# Patient Record
Sex: MALE | Race: WHITE | Employment: OTHER | URBAN - METROPOLITAN AREA
[De-identification: names, ages, dates, MRNs, and addresses within clinical notes are randomized per-mention and may not be internally consistent; named-entity substitution may affect disease eponyms.]

---

## 2019-04-23 ENCOUNTER — APPOINTMENT (OUTPATIENT)
Dept: GENERAL RADIOLOGY | Age: 74
End: 2019-04-23
Payer: MEDICARE

## 2019-04-23 ENCOUNTER — HOSPITAL ENCOUNTER (EMERGENCY)
Age: 74
Discharge: HOME | End: 2019-04-23
Attending: EMERGENCY MEDICINE
Payer: MEDICARE

## 2019-04-23 VITALS
WEIGHT: 235 LBS | BODY MASS INDEX: 31.83 KG/M2 | RESPIRATION RATE: 18 BRPM | SYSTOLIC BLOOD PRESSURE: 190 MMHG | HEIGHT: 72 IN | OXYGEN SATURATION: 94 % | HEART RATE: 83 BPM | DIASTOLIC BLOOD PRESSURE: 108 MMHG | TEMPERATURE: 97.7 F

## 2019-04-23 DIAGNOSIS — E86.0 DEHYDRATION: ICD-10-CM

## 2019-04-23 DIAGNOSIS — R53.83 FATIGUE, UNSPECIFIED TYPE: Primary | ICD-10-CM

## 2019-04-23 LAB
ALBUMIN SERPL-MCNC: 4.2 G/DL (ref 3.5–5.2)
ALP BLD-CCNC: 65 U/L (ref 40–129)
ALT SERPL-CCNC: <5 U/L (ref 0–40)
ANION GAP SERPL CALCULATED.3IONS-SCNC: 11 MMOL/L (ref 7–16)
AST SERPL-CCNC: 22 U/L (ref 0–39)
BASOPHILS ABSOLUTE: 0.04 E9/L (ref 0–0.2)
BASOPHILS RELATIVE PERCENT: 0.7 % (ref 0–2)
BILIRUB SERPL-MCNC: 0.4 MG/DL (ref 0–1.2)
BILIRUBIN URINE: NEGATIVE
BLOOD, URINE: NEGATIVE
BUN BLDV-MCNC: 19 MG/DL (ref 8–23)
CALCIUM SERPL-MCNC: 9.1 MG/DL (ref 8.6–10.2)
CHLORIDE BLD-SCNC: 105 MMOL/L (ref 98–107)
CLARITY: CLEAR
CO2: 25 MMOL/L (ref 22–29)
COLOR: YELLOW
CREAT SERPL-MCNC: 1.7 MG/DL (ref 0.7–1.2)
EOSINOPHILS ABSOLUTE: 0.11 E9/L (ref 0.05–0.5)
EOSINOPHILS RELATIVE PERCENT: 2 % (ref 0–6)
GFR AFRICAN AMERICAN: 48
GFR NON-AFRICAN AMERICAN: 39 ML/MIN/1.73
GLUCOSE BLD-MCNC: 70 MG/DL (ref 74–99)
GLUCOSE URINE: NEGATIVE MG/DL
HCT VFR BLD CALC: 47.4 % (ref 37–54)
HEMOGLOBIN: 15.9 G/DL (ref 12.5–16.5)
IMMATURE GRANULOCYTES #: 0.02 E9/L
IMMATURE GRANULOCYTES %: 0.4 % (ref 0–5)
INFLUENZA A BY PCR: NOT DETECTED
INFLUENZA B BY PCR: NOT DETECTED
KETONES, URINE: ABNORMAL MG/DL
LACTIC ACID: 1.4 MMOL/L (ref 0.5–2.2)
LEUKOCYTE ESTERASE, URINE: NEGATIVE
LYMPHOCYTES ABSOLUTE: 1.51 E9/L (ref 1.5–4)
LYMPHOCYTES RELATIVE PERCENT: 27.6 % (ref 20–42)
MAGNESIUM: 2.1 MG/DL (ref 1.6–2.6)
MCH RBC QN AUTO: 32 PG (ref 26–35)
MCHC RBC AUTO-ENTMCNC: 33.5 % (ref 32–34.5)
MCV RBC AUTO: 95.4 FL (ref 80–99.9)
METER GLUCOSE: 70 MG/DL (ref 74–99)
MONOCYTES ABSOLUTE: 0.6 E9/L (ref 0.1–0.95)
MONOCYTES RELATIVE PERCENT: 10.9 % (ref 2–12)
NEUTROPHILS ABSOLUTE: 3.2 E9/L (ref 1.8–7.3)
NEUTROPHILS RELATIVE PERCENT: 58.4 % (ref 43–80)
NITRITE, URINE: NEGATIVE
PDW BLD-RTO: 12.9 FL (ref 11.5–15)
PH UA: 6.5 (ref 5–9)
PLATELET # BLD: 141 E9/L (ref 130–450)
PMV BLD AUTO: 10.5 FL (ref 7–12)
POTASSIUM SERPL-SCNC: 4.8 MMOL/L (ref 3.5–5)
PRO-BNP: 168 PG/ML (ref 0–450)
PROTEIN UA: NEGATIVE MG/DL
RBC # BLD: 4.97 E12/L (ref 3.8–5.8)
SODIUM BLD-SCNC: 141 MMOL/L (ref 132–146)
SPECIFIC GRAVITY UA: 1.01 (ref 1–1.03)
T4 TOTAL: 7.3 MCG/DL (ref 4.5–11.7)
TOTAL PROTEIN: 6.7 G/DL (ref 6.4–8.3)
TROPONIN: <0.01 NG/ML (ref 0–0.03)
TSH SERPL DL<=0.05 MIU/L-ACNC: 1.54 UIU/ML (ref 0.27–4.2)
UROBILINOGEN, URINE: 0.2 E.U./DL
WBC # BLD: 5.5 E9/L (ref 4.5–11.5)

## 2019-04-23 PROCEDURE — 71045 X-RAY EXAM CHEST 1 VIEW: CPT

## 2019-04-23 PROCEDURE — 2580000003 HC RX 258: Performed by: EMERGENCY MEDICINE

## 2019-04-23 PROCEDURE — 99284 EMERGENCY DEPT VISIT MOD MDM: CPT

## 2019-04-23 PROCEDURE — 83735 ASSAY OF MAGNESIUM: CPT

## 2019-04-23 PROCEDURE — 85025 COMPLETE CBC W/AUTO DIFF WBC: CPT

## 2019-04-23 PROCEDURE — 96360 HYDRATION IV INFUSION INIT: CPT

## 2019-04-23 PROCEDURE — 81003 URINALYSIS AUTO W/O SCOPE: CPT

## 2019-04-23 PROCEDURE — 83880 ASSAY OF NATRIURETIC PEPTIDE: CPT

## 2019-04-23 PROCEDURE — 82962 GLUCOSE BLOOD TEST: CPT

## 2019-04-23 PROCEDURE — 84436 ASSAY OF TOTAL THYROXINE: CPT

## 2019-04-23 PROCEDURE — 93005 ELECTROCARDIOGRAM TRACING: CPT | Performed by: EMERGENCY MEDICINE

## 2019-04-23 PROCEDURE — 83605 ASSAY OF LACTIC ACID: CPT

## 2019-04-23 PROCEDURE — 84484 ASSAY OF TROPONIN QUANT: CPT

## 2019-04-23 PROCEDURE — 80053 COMPREHEN METABOLIC PANEL: CPT

## 2019-04-23 PROCEDURE — 87502 INFLUENZA DNA AMP PROBE: CPT

## 2019-04-23 PROCEDURE — 84443 ASSAY THYROID STIM HORMONE: CPT

## 2019-04-23 RX ORDER — 0.9 % SODIUM CHLORIDE 0.9 %
1000 INTRAVENOUS SOLUTION INTRAVENOUS ONCE
Status: COMPLETED | OUTPATIENT
Start: 2019-04-23 | End: 2019-04-23

## 2019-04-23 RX ORDER — GLIPIZIDE 5 MG/1
2.5 TABLET ORAL
COMMUNITY

## 2019-04-23 RX ADMIN — SODIUM CHLORIDE 1000 ML: 9 INJECTION, SOLUTION INTRAVENOUS at 18:03

## 2019-04-23 ASSESSMENT — ENCOUNTER SYMPTOMS
WHEEZING: 0
SHORTNESS OF BREATH: 0
ABDOMINAL PAIN: 0
EYE REDNESS: 0
VOMITING: 0
COUGH: 0
NAUSEA: 0
DIARRHEA: 0
SORE THROAT: 0
EYE PAIN: 0
BACK PAIN: 0
SINUS PRESSURE: 0
EYE DISCHARGE: 0

## 2019-04-23 NOTE — ED PROVIDER NOTES
Patient is a 66-year-old male presenting to ED with complaint of fatigue. Patient states he is feeling well yesterday during the afternoon today patient suddenly broke out in a sweat has felt very weak. Patient went into his PCP who advised them to come to the ED to be evaluated. Patient had a normal blood glucose, patient had normal examination at the office. Other than appearing severely fatigued. Patient was sent over to our facility. Patient denies any abdominal pain experience intermittent shortness of breath. Review of Systems   Constitutional: Positive for activity change, diaphoresis and fatigue. Negative for chills and fever. HENT: Negative for ear pain, sinus pressure and sore throat. Eyes: Negative for pain, discharge and redness. Respiratory: Negative for cough, shortness of breath and wheezing. Cardiovascular: Negative for chest pain. Gastrointestinal: Negative for abdominal pain, diarrhea, nausea and vomiting. Genitourinary: Negative for dysuria and frequency. Musculoskeletal: Negative for arthralgias and back pain. Skin: Negative for rash and wound. Neurological: Positive for weakness. Negative for headaches. Hematological: Negative for adenopathy. All other systems reviewed and are negative. Physical Exam   Constitutional: He is oriented to person, place, and time. He appears well-developed and well-nourished. No distress. HENT:   Head: Normocephalic and atraumatic. Eyes: Pupils are equal, round, and reactive to light. Neck: Normal range of motion. Neck supple. Cardiovascular: Normal rate, regular rhythm and normal heart sounds. Pulmonary/Chest: Effort normal and breath sounds normal. No respiratory distress. He has no wheezes. He has no rales. Abdominal: Soft. Bowel sounds are normal. He exhibits no distension. There is no tenderness. There is no rebound and no guarding. No palpable masses   Musculoskeletal: He exhibits no edema or tenderness. Ketones, Urine TRACE (A) Negative mg/dL    Specific Gravity, UA 1.010 1.005 - 1.030    Blood, Urine Negative Negative    pH, UA 6.5 5.0 - 9.0    Protein, UA Negative Negative mg/dL    Urobilinogen, Urine 0.2 <2.0 E.U./dL    Nitrite, Urine Negative Negative    Leukocyte Esterase, Urine Negative Negative   Brain Natriuretic Peptide   Result Value Ref Range    Pro- 0 - 450 pg/mL   TSH without Reflex   Result Value Ref Range    TSH 1.540 0.270 - 4.200 uIU/mL   T4   Result Value Ref Range    T4, Total 7.3 4.5 - 11.7 mcg/dL   POCT Glucose   Result Value Ref Range    Meter Glucose 70 (L) 74 - 99 mg/dL   EKG 12 Lead   Result Value Ref Range    Ventricular Rate 101 BPM    Atrial Rate 101 BPM    P-R Interval 162 ms    QRS Duration 90 ms    Q-T Interval 368 ms    QTc Calculation (Bazett) 477 ms    P Axis 43 degrees    R Axis -33 degrees    T Axis 3 degrees       Radiology:  XR CHEST PORTABLE   Final Result      No airspace opacities or pleural effusion.                ------------------------- NURSING NOTES AND VITALS REVIEWED ---------------------------  Date / Time Roomed:  4/23/2019  4:39 PM  ED Bed Assignment:  17/17    The nursing notes within the ED encounter and vital signs as below have been reviewed. BP (!) 190/108   Pulse 83   Temp 97.7 °F (36.5 °C)   Resp 18   Ht 6' (1.829 m)   Wt 235 lb (106.6 kg)   SpO2 94%   BMI 31.87 kg/m²   Oxygen Saturation Interpretation: Normal      ------------------------------------------ PROGRESS NOTES ------------------------------------------         11:27 AM  I have spoken with the patient and discussed todays results, in addition to providing specific details for the plan of care and counseling regarding the diagnosis and prognosis. Their questions are answered at this time and they are agreeable with the plan. I discussed at length with them reasons for immediate return here for re evaluation.  They will followup with their primary care physician by calling their office tomorrow. --------------------------------- ADDITIONAL PROVIDER NOTES ---------------------------------  At this time the patient is without objective evidence of an acute process requiring hospitalization or inpatient management. They have remained hemodynamically stable throughout their entire ED visit and are stable for discharge with outpatient follow-up. The plan has been discussed in detail and they are aware of the specific conditions for emergent return, as well as the importance of follow-up. Discharge Medication List as of 4/23/2019  7:24 PM          Diagnosis:  1. Fatigue, unspecified type    2. Dehydration        Disposition:  Patient's disposition: Discharge to home  Patient's condition is stable.          Tahira Soria DO  Resident  04/24/19 [id number]

## 2019-04-24 LAB
EKG ATRIAL RATE: 101 BPM
EKG P AXIS: 43 DEGREES
EKG P-R INTERVAL: 162 MS
EKG Q-T INTERVAL: 368 MS
EKG QRS DURATION: 90 MS
EKG QTC CALCULATION (BAZETT): 477 MS
EKG R AXIS: -33 DEGREES
EKG T AXIS: 3 DEGREES
EKG VENTRICULAR RATE: 101 BPM

## 2020-08-16 PROCEDURE — 99284 EMERGENCY DEPT VISIT MOD MDM: CPT

## 2020-08-17 ENCOUNTER — HOSPITAL ENCOUNTER (EMERGENCY)
Age: 75
Discharge: HOME | End: 2020-08-17
Attending: EMERGENCY MEDICINE
Payer: MEDICARE

## 2020-08-17 ENCOUNTER — APPOINTMENT (OUTPATIENT)
Dept: GENERAL RADIOLOGY | Age: 75
End: 2020-08-17
Payer: MEDICARE

## 2020-08-17 ENCOUNTER — APPOINTMENT (OUTPATIENT)
Dept: CT IMAGING | Age: 75
End: 2020-08-17
Payer: MEDICARE

## 2020-08-17 VITALS
SYSTOLIC BLOOD PRESSURE: 190 MMHG | HEIGHT: 72 IN | RESPIRATION RATE: 16 BRPM | BODY MASS INDEX: 32.51 KG/M2 | TEMPERATURE: 98.2 F | OXYGEN SATURATION: 94 % | HEART RATE: 88 BPM | WEIGHT: 240 LBS | DIASTOLIC BLOOD PRESSURE: 99 MMHG

## 2020-08-17 LAB
ALBUMIN SERPL-MCNC: 4 G/DL (ref 3.5–5.2)
ALP BLD-CCNC: 63 U/L (ref 40–129)
ALT SERPL-CCNC: <5 U/L (ref 0–40)
ANION GAP SERPL CALCULATED.3IONS-SCNC: 10 MMOL/L (ref 7–16)
AST SERPL-CCNC: 22 U/L (ref 0–39)
BASOPHILS ABSOLUTE: 0.04 E9/L (ref 0–0.2)
BASOPHILS RELATIVE PERCENT: 0.7 % (ref 0–2)
BILIRUB SERPL-MCNC: 0.4 MG/DL (ref 0–1.2)
BUN BLDV-MCNC: 22 MG/DL (ref 8–23)
CALCIUM SERPL-MCNC: 9.2 MG/DL (ref 8.6–10.2)
CHLORIDE BLD-SCNC: 101 MMOL/L (ref 98–107)
CO2: 25 MMOL/L (ref 22–29)
CREAT SERPL-MCNC: 1.4 MG/DL (ref 0.7–1.2)
EKG ATRIAL RATE: 90 BPM
EKG P-R INTERVAL: 180 MS
EKG Q-T INTERVAL: 386 MS
EKG QRS DURATION: 96 MS
EKG QTC CALCULATION (BAZETT): 472 MS
EKG R AXIS: 164 DEGREES
EKG T AXIS: 149 DEGREES
EKG VENTRICULAR RATE: 90 BPM
EOSINOPHILS ABSOLUTE: 0.11 E9/L (ref 0.05–0.5)
EOSINOPHILS RELATIVE PERCENT: 1.9 % (ref 0–6)
GFR AFRICAN AMERICAN: 59
GFR NON-AFRICAN AMERICAN: 49 ML/MIN/1.73
GLUCOSE BLD-MCNC: 86 MG/DL (ref 74–99)
HCT VFR BLD CALC: 47.9 % (ref 37–54)
HEMOGLOBIN: 15.8 G/DL (ref 12.5–16.5)
IMMATURE GRANULOCYTES #: 0.03 E9/L
IMMATURE GRANULOCYTES %: 0.5 % (ref 0–5)
LYMPHOCYTES ABSOLUTE: 1.72 E9/L (ref 1.5–4)
LYMPHOCYTES RELATIVE PERCENT: 29.3 % (ref 20–42)
MCH RBC QN AUTO: 31.4 PG (ref 26–35)
MCHC RBC AUTO-ENTMCNC: 33 % (ref 32–34.5)
MCV RBC AUTO: 95.2 FL (ref 80–99.9)
MONOCYTES ABSOLUTE: 0.67 E9/L (ref 0.1–0.95)
MONOCYTES RELATIVE PERCENT: 11.4 % (ref 2–12)
NEUTROPHILS ABSOLUTE: 3.31 E9/L (ref 1.8–7.3)
NEUTROPHILS RELATIVE PERCENT: 56.2 % (ref 43–80)
PDW BLD-RTO: 13 FL (ref 11.5–15)
PLATELET # BLD: 142 E9/L (ref 130–450)
PMV BLD AUTO: 9.7 FL (ref 7–12)
POTASSIUM REFLEX MAGNESIUM: 4.9 MMOL/L (ref 3.5–5)
PRO-BNP: 146 PG/ML (ref 0–450)
RBC # BLD: 5.03 E12/L (ref 3.8–5.8)
SODIUM BLD-SCNC: 136 MMOL/L (ref 132–146)
TOTAL PROTEIN: 7 G/DL (ref 6.4–8.3)
TROPONIN: <0.01 NG/ML (ref 0–0.03)
WBC # BLD: 5.9 E9/L (ref 4.5–11.5)

## 2020-08-17 PROCEDURE — U0003 INFECTIOUS AGENT DETECTION BY NUCLEIC ACID (DNA OR RNA); SEVERE ACUTE RESPIRATORY SYNDROME CORONAVIRUS 2 (SARS-COV-2) (CORONAVIRUS DISEASE [COVID-19]), AMPLIFIED PROBE TECHNIQUE, MAKING USE OF HIGH THROUGHPUT TECHNOLOGIES AS DESCRIBED BY CMS-2020-01-R: HCPCS

## 2020-08-17 PROCEDURE — 71045 X-RAY EXAM CHEST 1 VIEW: CPT

## 2020-08-17 PROCEDURE — 83880 ASSAY OF NATRIURETIC PEPTIDE: CPT

## 2020-08-17 PROCEDURE — 93010 ELECTROCARDIOGRAM REPORT: CPT | Performed by: INTERNAL MEDICINE

## 2020-08-17 PROCEDURE — 71250 CT THORAX DX C-: CPT

## 2020-08-17 PROCEDURE — 85025 COMPLETE CBC W/AUTO DIFF WBC: CPT

## 2020-08-17 PROCEDURE — 6370000000 HC RX 637 (ALT 250 FOR IP): Performed by: EMERGENCY MEDICINE

## 2020-08-17 PROCEDURE — 84484 ASSAY OF TROPONIN QUANT: CPT

## 2020-08-17 PROCEDURE — 74176 CT ABD & PELVIS W/O CONTRAST: CPT

## 2020-08-17 PROCEDURE — 80053 COMPREHEN METABOLIC PANEL: CPT

## 2020-08-17 PROCEDURE — 93005 ELECTROCARDIOGRAM TRACING: CPT | Performed by: EMERGENCY MEDICINE

## 2020-08-17 RX ORDER — DOXYCYCLINE HYCLATE 100 MG/1
100 CAPSULE ORAL ONCE
Status: COMPLETED | OUTPATIENT
Start: 2020-08-17 | End: 2020-08-17

## 2020-08-17 RX ORDER — CEFDINIR 300 MG/1
300 CAPSULE ORAL 2 TIMES DAILY
Qty: 14 CAPSULE | Refills: 0 | Status: SHIPPED | OUTPATIENT
Start: 2020-08-17 | End: 2020-08-24

## 2020-08-17 RX ORDER — DOXYCYCLINE HYCLATE 100 MG
100 TABLET ORAL 2 TIMES DAILY
Qty: 14 TABLET | Refills: 0 | Status: SHIPPED | OUTPATIENT
Start: 2020-08-17 | End: 2020-08-24

## 2020-08-17 RX ORDER — CEFDINIR 300 MG/1
300 CAPSULE ORAL ONCE
Status: COMPLETED | OUTPATIENT
Start: 2020-08-17 | End: 2020-08-17

## 2020-08-17 RX ADMIN — CEFDINIR 300 MG: 300 CAPSULE ORAL at 04:13

## 2020-08-17 RX ADMIN — DOXYCYCLINE HYCLATE 100 MG: 100 CAPSULE ORAL at 04:13

## 2020-08-17 NOTE — ED PROVIDER NOTES
HPI:  8/17/20, Time: 1:06 AM EDT         Esther Contreras is a 66 y.o. male presenting to the ED for shortness of breath beginning this evening. Patient has a history of renal carcinoma status post nephrectomy in 2012. Patient's wife states that he had a provoked PE at that time, and he was on Coumadin for 6 months. He no longer takes anticoagulation. He states he follows with an oncologist, but as far as he is concerned he has no active disease. He states that they are watching a lesion in his chest.  He states that this evening he woke up feeling short of breath. It is worse when he lays flat. He is unsure if it is related to exertion, but states he did not walk very far today. He also developed a nonproductive cough this evening. No documented fevers or hemoptysis. No chest pain, nausea, vomiting, diarrhea, or hemoptysis. He has been having some intermittent abdominal discomfort as well for the last month. He denies history of CAD/MI. He states he has never undergone a cardiac catheterization or cardiac stress test.    Review of Systems:   Pertinent positives and negatives are stated within HPI, all other systems reviewed and are negative.          --------------------------------------------- PAST HISTORY ---------------------------------------------  Past Medical History:  has a past medical history of YUDY (acute kidney injury) ([facility] [address] Anxiety and depression, Cancer ([facility] [address] Disc displacement, lumbar, Kidney disease, Nerve injury, Parkinson disease ([facility] [address] Pneumonia, Pulmonary embolism ([facility] [address] and Renal carcinoma ([facility] [address] Past Surgical History:  has a past surgical history that includes Cataract removal; back surgery; eye surgery; and Endoscopy, colon, diagnostic. Social History:  reports that he has quit smoking. He has never used smokeless tobacco. He reports current alcohol use. He reports that he does not use drugs.     Family History: family history includes Cancer in his sister; Diabetes in his brother; Heart Disease in his father and mother. The patients home medications have been reviewed.     Allergies: Tape [adhesive tape]    -------------------------------------------------- RESULTS -------------------------------------------------  All laboratory and radiology results have been personally reviewed by myself   LABS:  Results for orders placed or performed during the hospital encounter of 08/17/20   CBC Auto Differential   Result Value Ref Range    WBC 5.9 4.5 - 11.5 E9/L    RBC 5.03 3.80 - 5.80 E12/L    Hemoglobin 15.8 12.5 - 16.5 g/dL    Hematocrit 47.9 37.0 - 54.0 %    MCV 95.2 80.0 - 99.9 fL    MCH 31.4 26.0 - 35.0 pg    MCHC 33.0 32.0 - 34.5 %    RDW 13.0 11.5 - 15.0 fL    Platelets 293 566 - 741 E9/L    MPV 9.7 7.0 - 12.0 fL    Neutrophils % 56.2 43.0 - 80.0 %    Immature Granulocytes % 0.5 0.0 - 5.0 %    Lymphocytes % 29.3 20.0 - 42.0 %    Monocytes % 11.4 2.0 - 12.0 %    Eosinophils % 1.9 0.0 - 6.0 %    Basophils % 0.7 0.0 - 2.0 %    Neutrophils Absolute 3.31 1.80 - 7.30 E9/L    Immature Granulocytes # 0.03 E9/L    Lymphocytes Absolute 1.72 1.50 - 4.00 E9/L    Monocytes Absolute 0.67 0.10 - 0.95 E9/L    Eosinophils Absolute 0.11 0.05 - 0.50 E9/L    Basophils Absolute 0.04 0.00 - 0.20 E9/L   Comprehensive Metabolic Panel w/ Reflex to MG   Result Value Ref Range    Sodium 136 132 - 146 mmol/L    Potassium reflex Magnesium 4.9 3.5 - 5.0 mmol/L    Chloride 101 98 - 107 mmol/L    CO2 25 22 - 29 mmol/L    Anion Gap 10 7 - 16 mmol/L    Glucose 86 74 - 99 mg/dL    BUN 22 8 - 23 mg/dL    CREATININE 1.4 (H) 0.7 - 1.2 mg/dL    GFR Non-African American 49 >=60 mL/min/1.73    GFR African American 59     Calcium 9.2 8.6 - 10.2 mg/dL    Total Protein 7.0 6.4 - 8.3 g/dL    Alb 4.0 3.5 - 5.2 g/dL    Total Bilirubin 0.4 0.0 - 1.2 mg/dL    Alkaline Phosphatase 63 40 - 129 U/L    ALT <5 0 - 40 U/L    AST 22 0 - 39 U/L   Troponin   Result Value Ref Range    Troponin <0.01 0.00 - 0.03 ng/mL   Brain Natriuretic

## 2020-08-18 ENCOUNTER — CARE COORDINATION (OUTPATIENT)
Dept: CARE COORDINATION | Age: 75
End: 2020-08-18

## 2020-08-18 LAB
SARS-COV-2: NOT DETECTED
SOURCE: NORMAL

## 2020-08-19 ENCOUNTER — CARE COORDINATION (OUTPATIENT)
Dept: CARE COORDINATION | Age: 75
End: 2020-08-19

## 2020-08-19 NOTE — CARE COORDINATION
COVID-19 ED/Flu Clinic Initial Outreach Note      This Roger Sykes made second attempt to contact the patient by telephone to perform post discharge call. Left message. Will no longer make an outreach.

## 2021-01-28 ENCOUNTER — IMMUNIZATION (OUTPATIENT)
Dept: PRIMARY CARE CLINIC | Age: 76
End: 2021-01-28
Payer: MEDICARE

## 2021-01-28 PROCEDURE — 91300 COVID-19, PFIZER VACCINE 30MCG/0.3ML DOSE: CPT | Performed by: NURSE PRACTITIONER

## 2021-01-28 PROCEDURE — 0001A COVID-19, PFIZER VACCINE 30MCG/0.3ML DOSE: CPT | Performed by: NURSE PRACTITIONER

## 2021-02-18 ENCOUNTER — IMMUNIZATION (OUTPATIENT)
Dept: PRIMARY CARE CLINIC | Age: 76
End: 2021-02-18
Payer: MEDICARE

## 2021-02-18 PROCEDURE — 91300 COVID-19, PFIZER VACCINE 30MCG/0.3ML DOSE: CPT | Performed by: PHYSICIAN ASSISTANT

## 2021-02-18 PROCEDURE — 0002A COVID-19, PFIZER VACCINE 30MCG/0.3ML DOSE: CPT | Performed by: PHYSICIAN ASSISTANT

## 2021-03-19 ENCOUNTER — APPOINTMENT (OUTPATIENT)
Dept: GENERAL RADIOLOGY | Age: 76
End: 2021-03-19
Payer: MEDICARE

## 2021-03-19 ENCOUNTER — APPOINTMENT (OUTPATIENT)
Dept: ULTRASOUND IMAGING | Age: 76
End: 2021-03-19
Payer: MEDICARE

## 2021-03-19 ENCOUNTER — APPOINTMENT (OUTPATIENT)
Dept: CT IMAGING | Age: 76
End: 2021-03-19
Payer: MEDICARE

## 2021-03-19 ENCOUNTER — HOSPITAL ENCOUNTER (OUTPATIENT)
Age: 76
Setting detail: OBSERVATION
Discharge: HOME | End: 2021-03-21
Attending: EMERGENCY MEDICINE | Admitting: INTERNAL MEDICINE
Payer: MEDICARE

## 2021-03-19 DIAGNOSIS — R06.00 DYSPNEA AND RESPIRATORY ABNORMALITIES: ICD-10-CM

## 2021-03-19 DIAGNOSIS — M51.16 LUMBAR DISC HERNIATION WITH RADICULOPATHY: ICD-10-CM

## 2021-03-19 DIAGNOSIS — M79.605 LEFT LEG PAIN: Primary | ICD-10-CM

## 2021-03-19 DIAGNOSIS — R06.89 DYSPNEA AND RESPIRATORY ABNORMALITIES: ICD-10-CM

## 2021-03-19 LAB
ANION GAP SERPL CALCULATED.3IONS-SCNC: 7 MMOL/L (ref 7–16)
APTT: 29.3 SEC (ref 24.5–35.1)
BASOPHILS ABSOLUTE: 0.03 E9/L (ref 0–0.2)
BASOPHILS RELATIVE PERCENT: 0.6 % (ref 0–2)
BUN BLDV-MCNC: 17 MG/DL (ref 8–23)
CALCIUM SERPL-MCNC: 9.4 MG/DL (ref 8.6–10.2)
CHLORIDE BLD-SCNC: 104 MMOL/L (ref 98–107)
CO2: 27 MMOL/L (ref 22–29)
CREAT SERPL-MCNC: 1.3 MG/DL (ref 0.7–1.2)
D DIMER: 407 NG/ML DDU
EKG ATRIAL RATE: 101 BPM
EKG P AXIS: 44 DEGREES
EKG P-R INTERVAL: 166 MS
EKG Q-T INTERVAL: 352 MS
EKG QRS DURATION: 98 MS
EKG QTC CALCULATION (BAZETT): 456 MS
EKG R AXIS: -33 DEGREES
EKG T AXIS: 8 DEGREES
EKG VENTRICULAR RATE: 101 BPM
EOSINOPHILS ABSOLUTE: 0.04 E9/L (ref 0.05–0.5)
EOSINOPHILS RELATIVE PERCENT: 0.8 % (ref 0–6)
FOLATE: 13.5 NG/ML (ref 4.8–24.2)
GFR AFRICAN AMERICAN: >60
GFR NON-AFRICAN AMERICAN: 53 ML/MIN/1.73
GLUCOSE BLD-MCNC: 143 MG/DL (ref 74–99)
HCT VFR BLD CALC: 47.2 % (ref 37–54)
HEMOGLOBIN: 15.5 G/DL (ref 12.5–16.5)
IMMATURE GRANULOCYTES #: 0.02 E9/L
IMMATURE GRANULOCYTES %: 0.4 % (ref 0–5)
INR BLD: 1
LYMPHOCYTES ABSOLUTE: 0.95 E9/L (ref 1.5–4)
LYMPHOCYTES RELATIVE PERCENT: 19.6 % (ref 20–42)
MCH RBC QN AUTO: 31.4 PG (ref 26–35)
MCHC RBC AUTO-ENTMCNC: 32.8 % (ref 32–34.5)
MCV RBC AUTO: 95.5 FL (ref 80–99.9)
METER GLUCOSE: 108 MG/DL (ref 74–99)
MONOCYTES ABSOLUTE: 0.46 E9/L (ref 0.1–0.95)
MONOCYTES RELATIVE PERCENT: 9.5 % (ref 2–12)
NEUTROPHILS ABSOLUTE: 3.34 E9/L (ref 1.8–7.3)
NEUTROPHILS RELATIVE PERCENT: 69.1 % (ref 43–80)
PDW BLD-RTO: 12.8 FL (ref 11.5–15)
PLATELET # BLD: 140 E9/L (ref 130–450)
PMV BLD AUTO: 10 FL (ref 7–12)
POTASSIUM SERPL-SCNC: 4.5 MMOL/L (ref 3.5–5)
PRO-BNP: 239 PG/ML (ref 0–450)
PROTHROMBIN TIME: 10.9 SEC (ref 9.3–12.4)
RBC # BLD: 4.94 E12/L (ref 3.8–5.8)
SEDIMENTATION RATE, ERYTHROCYTE: 2 MM/HR (ref 0–15)
SODIUM BLD-SCNC: 138 MMOL/L (ref 132–146)
TROPONIN: <0.01 NG/ML (ref 0–0.03)
VITAMIN B-12: 248 PG/ML (ref 211–946)
WBC # BLD: 4.8 E9/L (ref 4.5–11.5)

## 2021-03-19 PROCEDURE — 85730 THROMBOPLASTIN TIME PARTIAL: CPT

## 2021-03-19 PROCEDURE — 6370000000 HC RX 637 (ALT 250 FOR IP): Performed by: INTERNAL MEDICINE

## 2021-03-19 PROCEDURE — 85610 PROTHROMBIN TIME: CPT

## 2021-03-19 PROCEDURE — 86140 C-REACTIVE PROTEIN: CPT

## 2021-03-19 PROCEDURE — 0202U NFCT DS 22 TRGT SARS-COV-2: CPT

## 2021-03-19 PROCEDURE — 84484 ASSAY OF TROPONIN QUANT: CPT

## 2021-03-19 PROCEDURE — 6360000002 HC RX W HCPCS: Performed by: INTERNAL MEDICINE

## 2021-03-19 PROCEDURE — G0378 HOSPITAL OBSERVATION PER HR: HCPCS

## 2021-03-19 PROCEDURE — 93971 EXTREMITY STUDY: CPT

## 2021-03-19 PROCEDURE — 82962 GLUCOSE BLOOD TEST: CPT

## 2021-03-19 PROCEDURE — 85025 COMPLETE CBC W/AUTO DIFF WBC: CPT

## 2021-03-19 PROCEDURE — 99284 EMERGENCY DEPT VISIT MOD MDM: CPT

## 2021-03-19 PROCEDURE — 83880 ASSAY OF NATRIURETIC PEPTIDE: CPT

## 2021-03-19 PROCEDURE — 82746 ASSAY OF FOLIC ACID SERUM: CPT

## 2021-03-19 PROCEDURE — 93005 ELECTROCARDIOGRAM TRACING: CPT | Performed by: EMERGENCY MEDICINE

## 2021-03-19 PROCEDURE — 36415 COLL VENOUS BLD VENIPUNCTURE: CPT

## 2021-03-19 PROCEDURE — 85378 FIBRIN DEGRADE SEMIQUANT: CPT

## 2021-03-19 PROCEDURE — 2580000003 HC RX 258: Performed by: INTERNAL MEDICINE

## 2021-03-19 PROCEDURE — 82607 VITAMIN B-12: CPT

## 2021-03-19 PROCEDURE — 94640 AIRWAY INHALATION TREATMENT: CPT

## 2021-03-19 PROCEDURE — 80048 BASIC METABOLIC PNL TOTAL CA: CPT

## 2021-03-19 PROCEDURE — 71045 X-RAY EXAM CHEST 1 VIEW: CPT

## 2021-03-19 PROCEDURE — 85651 RBC SED RATE NONAUTOMATED: CPT

## 2021-03-19 PROCEDURE — 93010 ELECTROCARDIOGRAM REPORT: CPT | Performed by: INTERNAL MEDICINE

## 2021-03-19 PROCEDURE — 84145 PROCALCITONIN (PCT): CPT

## 2021-03-19 PROCEDURE — 94664 DEMO&/EVAL PT USE INHALER: CPT

## 2021-03-19 RX ORDER — IPRATROPIUM BROMIDE AND ALBUTEROL SULFATE 2.5; .5 MG/3ML; MG/3ML
1 SOLUTION RESPIRATORY (INHALATION)
Status: DISCONTINUED | OUTPATIENT
Start: 2021-03-19 | End: 2021-03-21 | Stop reason: HOSPADM

## 2021-03-19 RX ORDER — GABAPENTIN 100 MG/1
200 CAPSULE ORAL NIGHTLY
Status: DISCONTINUED | OUTPATIENT
Start: 2021-03-19 | End: 2021-03-20

## 2021-03-19 RX ORDER — ASPIRIN 81 MG/1
81 TABLET, CHEWABLE ORAL DAILY
Status: DISCONTINUED | OUTPATIENT
Start: 2021-03-19 | End: 2021-03-21 | Stop reason: HOSPADM

## 2021-03-19 RX ORDER — ARFORMOTEROL TARTRATE 15 UG/2ML
15 SOLUTION RESPIRATORY (INHALATION) 2 TIMES DAILY
Status: DISCONTINUED | OUTPATIENT
Start: 2021-03-19 | End: 2021-03-21 | Stop reason: HOSPADM

## 2021-03-19 RX ORDER — GABAPENTIN 300 MG/1
300 CAPSULE ORAL DAILY
Status: DISCONTINUED | OUTPATIENT
Start: 2021-03-19 | End: 2021-03-20

## 2021-03-19 RX ORDER — SODIUM CHLORIDE 9 MG/ML
INJECTION, SOLUTION INTRAVENOUS CONTINUOUS
Status: DISCONTINUED | OUTPATIENT
Start: 2021-03-19 | End: 2021-03-21 | Stop reason: HOSPADM

## 2021-03-19 RX ORDER — SODIUM CHLORIDE 0.9 % (FLUSH) 0.9 %
10 SYRINGE (ML) INJECTION ONCE
Status: DISCONTINUED | OUTPATIENT
Start: 2021-03-19 | End: 2021-03-21 | Stop reason: HOSPADM

## 2021-03-19 RX ORDER — DIAZEPAM 2 MG/1
2 TABLET ORAL EVERY 6 HOURS PRN
COMMUNITY

## 2021-03-19 RX ORDER — GABAPENTIN 100 MG/1
200 CAPSULE ORAL 2 TIMES DAILY
Status: ON HOLD | COMMUNITY
End: 2021-03-21 | Stop reason: HOSPADM

## 2021-03-19 RX ORDER — ACETAMINOPHEN 325 MG/1
650 TABLET ORAL EVERY 4 HOURS PRN
Status: DISCONTINUED | OUTPATIENT
Start: 2021-03-19 | End: 2021-03-21 | Stop reason: HOSPADM

## 2021-03-19 RX ORDER — PANTOPRAZOLE SODIUM 40 MG/1
40 TABLET, DELAYED RELEASE ORAL
Status: DISCONTINUED | OUTPATIENT
Start: 2021-03-20 | End: 2021-03-21 | Stop reason: HOSPADM

## 2021-03-19 RX ORDER — BUDESONIDE 0.25 MG/2ML
250 INHALANT ORAL 2 TIMES DAILY
Status: DISCONTINUED | OUTPATIENT
Start: 2021-03-19 | End: 2021-03-21 | Stop reason: HOSPADM

## 2021-03-19 RX ORDER — RASAGILINE 1 MG/1
1 TABLET ORAL DAILY
Status: DISCONTINUED | OUTPATIENT
Start: 2021-03-19 | End: 2021-03-21 | Stop reason: HOSPADM

## 2021-03-19 RX ORDER — GABAPENTIN 100 MG/1
300 CAPSULE ORAL DAILY
COMMUNITY
End: 2021-03-19 | Stop reason: ALTCHOICE

## 2021-03-19 RX ORDER — GLIPIZIDE 5 MG/1
2.5 TABLET ORAL
Status: DISCONTINUED | OUTPATIENT
Start: 2021-03-19 | End: 2021-03-21 | Stop reason: HOSPADM

## 2021-03-19 RX ADMIN — CARBIDOPA AND LEVODOPA 1 TABLET: 25; 100 TABLET ORAL at 20:18

## 2021-03-19 RX ADMIN — GLIPIZIDE 2.5 MG: 5 TABLET ORAL at 20:18

## 2021-03-19 RX ADMIN — SODIUM CHLORIDE: 9 INJECTION, SOLUTION INTRAVENOUS at 20:19

## 2021-03-19 RX ADMIN — ARFORMOTEROL TARTRATE 15 MCG: 15 SOLUTION RESPIRATORY (INHALATION) at 21:09

## 2021-03-19 RX ADMIN — GABAPENTIN 200 MG: 100 CAPSULE ORAL at 20:19

## 2021-03-19 RX ADMIN — IPRATROPIUM BROMIDE AND ALBUTEROL SULFATE 1 AMPULE: 2.5; .5 SOLUTION RESPIRATORY (INHALATION) at 21:09

## 2021-03-19 RX ADMIN — BUDESONIDE 250 MCG: 0.25 SUSPENSION RESPIRATORY (INHALATION) at 21:09

## 2021-03-19 ASSESSMENT — PAIN SCALES - GENERAL: PAINLEVEL_OUTOF10: 0

## 2021-03-19 ASSESSMENT — PAIN DESCRIPTION - PROGRESSION: CLINICAL_PROGRESSION: GRADUALLY IMPROVING

## 2021-03-19 ASSESSMENT — PAIN DESCRIPTION - PAIN TYPE: TYPE: ACUTE PAIN

## 2021-03-19 NOTE — ED NOTES
Per wife she spoke to pt PCP and he stated that he is not to have any CT dye.       Halima Pollack RN  03/19/21 [id number]

## 2021-03-19 NOTE — ED PROVIDER NOTES
HPI:  3/19/21,   Time: 11:20 AM EDT         Daria Ruff is a 64 y.o. male presenting to the ED for sudden SOB today, left lower leg pain for one week, beginning several hours ago. The complaint has been persistent, moderate in severity, and worsened by light exertion. Patient states he is having some shortness of breath that started this morning. He also complained about left foot ankle and leg pain is progressively getting worse traveling up his leg. He was seen at [facility] and imaging of his ankle which was negative. He saw his PCP today sent him to the emergency department for evaluation. Patient has history of renal cell carcinoma history of pulmonary embolism and DVT in the past.  He is on no anticoagulation. No fevers chills cough. He does have shortness of breath with exertion. No Covid exposures. He did get his Covid vaccination this year. ROS:   Pertinent positives and negatives are stated within HPI, all other systems reviewed and are negative.  --------------------------------------------- PAST HISTORY ---------------------------------------------  Past Medical History:  has a past medical history of YUDY (acute kidney injury) ([facility] [address] Anxiety and depression, Cancer ([facility] [address] Disc displacement, lumbar, Kidney disease, Nerve injury, Parkinson disease ([facility] [address] Pneumonia, Pulmonary embolism ([facility] [address] and Renal carcinoma ([facility] Past Surgical History:  has a past surgical history that includes Cataract removal; back surgery; eye surgery; and Endoscopy, colon, diagnostic. Social History:  reports that he has quit smoking. He has never used smokeless tobacco. He reports current alcohol use. He reports that he does not use drugs. Family History: family history includes Cancer in his sister; Diabetes in his brother; Heart Disease in his father and mother. The patients home medications have been reviewed.     Allergies: Tape [adhesive tape] -------------------------------------------------- RESULTS -------------------------------------------------  All laboratory and radiology results have been personally reviewed by myself   LABS:  Results for orders placed or performed during the hospital encounter of 03/19/21   CBC Auto Differential   Result Value Ref Range    WBC 4.8 4.5 - 11.5 E9/L    RBC 4.94 3.80 - 5.80 E12/L    Hemoglobin 15.5 12.5 - 16.5 g/dL    Hematocrit 47.2 37.0 - 54.0 %    MCV 95.5 80.0 - 99.9 fL    MCH 31.4 26.0 - 35.0 pg    MCHC 32.8 32.0 - 34.5 %    RDW 12.8 11.5 - 15.0 fL    Platelets 020 954 - 398 E9/L    MPV 10.0 7.0 - 12.0 fL    Neutrophils % 69.1 43.0 - 80.0 %    Immature Granulocytes % 0.4 0.0 - 5.0 %    Lymphocytes % 19.6 (L) 20.0 - 42.0 %    Monocytes % 9.5 2.0 - 12.0 %    Eosinophils % 0.8 0.0 - 6.0 %    Basophils % 0.6 0.0 - 2.0 %    Neutrophils Absolute 3.34 1.80 - 7.30 E9/L    Immature Granulocytes # 0.02 E9/L    Lymphocytes Absolute 0.95 (L) 1.50 - 4.00 E9/L    Monocytes Absolute 0.46 0.10 - 0.95 E9/L    Eosinophils Absolute 0.04 (L) 0.05 - 0.50 E9/L    Basophils Absolute 0.03 0.00 - 0.20 H9/Q   Basic Metabolic Panel   Result Value Ref Range    Sodium 138 132 - 146 mmol/L    Potassium 4.5 3.5 - 5.0 mmol/L    Chloride 104 98 - 107 mmol/L    CO2 27 22 - 29 mmol/L    Anion Gap 7 7 - 16 mmol/L    Glucose 143 (H) 74 - 99 mg/dL    BUN 17 8 - 23 mg/dL    CREATININE 1.3 (H) 0.7 - 1.2 mg/dL    GFR Non-African American 53 >=60 mL/min/1.73    GFR African American >60     Calcium 9.4 8.6 - 10.2 mg/dL   Troponin   Result Value Ref Range    Troponin <0.01 0.00 - 0.03 ng/mL   Brain Natriuretic Peptide   Result Value Ref Range    Pro- 0 - 450 pg/mL   APTT   Result Value Ref Range    aPTT 29.3 24.5 - 35.1 sec   Protime-INR   Result Value Ref Range    Protime 10.9 9.3 - 12.4 sec    INR 1.0    EKG 12 Lead   Result Value Ref Range    Ventricular Rate 101 BPM    Atrial Rate 101 BPM    P-R Interval 166 ms    QRS Duration 98 ms    Q-T Interval 352 ms    QTc Calculation (Bazett) 456 ms    P Axis 44 degrees    R Axis -33 degrees    T Axis 8 degrees       RADIOLOGY:  Interpreted by Radiologist.  US DUP LOWER EXTREMITY LEFT TODD   Final Result   No evidence of DVT in the left lower extremity. XR CHEST PORTABLE   Final Result   No acute pulmonary process             ------------------------- NURSING NOTES AND VITALS REVIEWED ---------------------------   The nursing notes within the ED encounter and vital signs as below have been reviewed. BP (!) 150/90   Pulse 91   Temp 97.4 °F (36.3 °C)   Resp 20   Ht 6' (1.829 m)   Wt 215 lb (97.5 kg)   SpO2 93%   BMI 29.16 kg/m²   Oxygen Saturation Interpretation: 90 % at bedside Abnormal placed on 3 liters NC      ---------------------------------------------------PHYSICAL EXAM--------------------------------------      Constitutional/General: Alert and oriented x2, well appearing, non toxic mild distress, patient is pleasantly confused  Head: NC/AT  Eyes: PERRL, EOMI  Mouth: Oropharynx clear, handling secretions, no trismus  Neck: Supple, full ROM, no meningeal signs  Pulmonary: Lungs clear to auscultation bilaterally, no wheezes, rales, or rhonchi. Not in respiratory distress, patient is mildly tachypneic. Cardiovascular:  Regular rate and rhythm, no murmurs, gallops, or rubs. 2+ distal pulses  Abdomen: Soft, non tender, non distended,   Extremities: Moves all extremities x 4. Warm and well perfused, left leg is mildly swollen compared to the right. It is not tense and is tender to palpation. There is no erythema or evidence of cellulitis. He does have a palpable pulses in the lower extremities bilaterally. Skin: warm and dry without rash  Neurologic: GCS 14 patient is mildly confused. ,  Psych: Normal Affect      ------------------------------ ED COURSE/MEDICAL DECISION MAKING----------------------  Medications   sodium chloride flush 0.9 % injection 10 mL (has no administration in time range)         Medical Decision Making:    Patient's ultrasound was negative for DVT. Patient's saturation remained at 9394% on room air. Wife stated he cannot have IV dye because of only having one kidney he had a nephrectomy many years ago because of renal cell carcinoma. Patient is currently not symptomatic he has no shortness of breath or chest pain. We did discuss admission to the hospital for further testing for pulmonary embolism. Patient decided he can go home wife stated she does have the ability to monitor his pulse ox at home. They were given specific instructions to return the emergency department if he develops worsening shortness of breath hemoptysis chest pain. Patient recently was tested negative for Covid. He has had both vaccinations. Counseling: The emergency provider has spoken with the patient and discussed todays results, in addition to providing specific details for the plan of care and counseling regarding the diagnosis and prognosis. Questions are answered at this time and they are agreeable with the plan.      --------------------------------- IMPRESSION AND DISPOSITION ---------------------------------    IMPRESSION  1. Left leg pain New Problem   2.  Dyspnea and respiratory abnormalities Stable       DISPOSITION  Disposition: Discharge to home  Patient condition is stable                Trino Ballesteros DO  03/19/21 [id number]

## 2021-03-20 ENCOUNTER — APPOINTMENT (OUTPATIENT)
Dept: MRI IMAGING | Age: 76
End: 2021-03-20
Payer: MEDICARE

## 2021-03-20 PROBLEM — Z86.711 HISTORY OF PULMONARY EMBOLUS (PE): Status: ACTIVE | Noted: 2021-03-20

## 2021-03-20 PROBLEM — B34.8 RHINOVIRUS INFECTION: Status: ACTIVE | Noted: 2021-03-20

## 2021-03-20 LAB
ADENOVIRUS BY PCR: NOT DETECTED
ALBUMIN SERPL-MCNC: 4 G/DL (ref 3.5–5.2)
ALP BLD-CCNC: 61 U/L (ref 40–129)
ALT SERPL-CCNC: <5 U/L (ref 0–40)
ANION GAP SERPL CALCULATED.3IONS-SCNC: 11 MMOL/L (ref 7–16)
AST SERPL-CCNC: 14 U/L (ref 0–39)
BASOPHILS ABSOLUTE: 0.03 E9/L (ref 0–0.2)
BASOPHILS RELATIVE PERCENT: 0.6 % (ref 0–2)
BILIRUB SERPL-MCNC: 0.7 MG/DL (ref 0–1.2)
BILIRUBIN DIRECT: <0.2 MG/DL (ref 0–0.3)
BILIRUBIN, INDIRECT: NORMAL MG/DL (ref 0–1)
BORDETELLA PARAPERTUSSIS BY PCR: NOT DETECTED
BORDETELLA PERTUSSIS BY PCR: NOT DETECTED
BUN BLDV-MCNC: 17 MG/DL (ref 8–23)
C-REACTIVE PROTEIN: 0.3 MG/DL (ref 0–0.4)
C-REACTIVE PROTEIN: 0.3 MG/DL (ref 0–0.4)
CALCIUM SERPL-MCNC: 9.3 MG/DL (ref 8.6–10.2)
CHLAMYDOPHILIA PNEUMONIAE BY PCR: NOT DETECTED
CHLORIDE BLD-SCNC: 106 MMOL/L (ref 98–107)
CHOLESTEROL, TOTAL: 179 MG/DL (ref 0–199)
CO2: 24 MMOL/L (ref 22–29)
CORONAVIRUS 229E BY PCR: NOT DETECTED
CORONAVIRUS HKU1 BY PCR: NOT DETECTED
CORONAVIRUS NL63 BY PCR: NOT DETECTED
CORONAVIRUS OC43 BY PCR: NOT DETECTED
CREAT SERPL-MCNC: 1.4 MG/DL (ref 0.7–1.2)
EOSINOPHILS ABSOLUTE: 0.04 E9/L (ref 0.05–0.5)
EOSINOPHILS RELATIVE PERCENT: 0.8 % (ref 0–6)
GFR AFRICAN AMERICAN: 59
GFR NON-AFRICAN AMERICAN: 49 ML/MIN/1.73
GLUCOSE BLD-MCNC: 125 MG/DL (ref 74–99)
HBA1C MFR BLD: 6 % (ref 4–5.6)
HCT VFR BLD CALC: 46.8 % (ref 37–54)
HDLC SERPL-MCNC: 51 MG/DL
HEMOGLOBIN: 15.2 G/DL (ref 12.5–16.5)
HUMAN METAPNEUMOVIRUS BY PCR: NOT DETECTED
HUMAN RHINOVIRUS/ENTEROVIRUS BY PCR: DETECTED
IMMATURE GRANULOCYTES #: 0.02 E9/L
IMMATURE GRANULOCYTES %: 0.4 % (ref 0–5)
INFLUENZA A BY PCR: NOT DETECTED
INFLUENZA B BY PCR: NOT DETECTED
LACTIC ACID, SEPSIS: 1.4 MMOL/L (ref 0.5–1.9)
LDL CHOLESTEROL CALCULATED: 114 MG/DL (ref 0–99)
LV EF: 60 %
LVEF MODALITY: NORMAL
LYMPHOCYTES ABSOLUTE: 1 E9/L (ref 1.5–4)
LYMPHOCYTES RELATIVE PERCENT: 20.6 % (ref 20–42)
MAGNESIUM: 1.9 MG/DL (ref 1.6–2.6)
MCH RBC QN AUTO: 31 PG (ref 26–35)
MCHC RBC AUTO-ENTMCNC: 32.5 % (ref 32–34.5)
MCV RBC AUTO: 95.5 FL (ref 80–99.9)
MONOCYTES ABSOLUTE: 0.46 E9/L (ref 0.1–0.95)
MONOCYTES RELATIVE PERCENT: 9.5 % (ref 2–12)
MYCOPLASMA PNEUMONIAE BY PCR: NOT DETECTED
NEUTROPHILS ABSOLUTE: 3.31 E9/L (ref 1.8–7.3)
NEUTROPHILS RELATIVE PERCENT: 68.1 % (ref 43–80)
PARAINFLUENZA VIRUS 1 BY PCR: NOT DETECTED
PARAINFLUENZA VIRUS 2 BY PCR: NOT DETECTED
PARAINFLUENZA VIRUS 3 BY PCR: NOT DETECTED
PARAINFLUENZA VIRUS 4 BY PCR: NOT DETECTED
PDW BLD-RTO: 12.8 FL (ref 11.5–15)
PHOSPHORUS: 3 MG/DL (ref 2.5–4.5)
PLATELET # BLD: 130 E9/L (ref 130–450)
PMV BLD AUTO: 9.8 FL (ref 7–12)
POTASSIUM SERPL-SCNC: 4.2 MMOL/L (ref 3.5–5)
PRO-BNP: 157 PG/ML (ref 0–450)
PROCALCITONIN: 0.09 NG/ML (ref 0–0.08)
RBC # BLD: 4.9 E12/L (ref 3.8–5.8)
RESPIRATORY SYNCYTIAL VIRUS BY PCR: NOT DETECTED
SARS-COV-2, PCR: NOT DETECTED
SEDIMENTATION RATE, ERYTHROCYTE: 1 MM/HR (ref 0–15)
SODIUM BLD-SCNC: 141 MMOL/L (ref 132–146)
TOTAL PROTEIN: 6.6 G/DL (ref 6.4–8.3)
TRIGL SERPL-MCNC: 69 MG/DL (ref 0–149)
TSH SERPL DL<=0.05 MIU/L-ACNC: 1.6 UIU/ML (ref 0.27–4.2)
URIC ACID, SERUM: 7.3 MG/DL (ref 3.4–7)
VLDLC SERPL CALC-MCNC: 14 MG/DL
WBC # BLD: 4.9 E9/L (ref 4.5–11.5)

## 2021-03-20 PROCEDURE — G0378 HOSPITAL OBSERVATION PER HR: HCPCS

## 2021-03-20 PROCEDURE — 6360000002 HC RX W HCPCS: Performed by: INTERNAL MEDICINE

## 2021-03-20 PROCEDURE — 85025 COMPLETE CBC W/AUTO DIFF WBC: CPT

## 2021-03-20 PROCEDURE — 84550 ASSAY OF BLOOD/URIC ACID: CPT

## 2021-03-20 PROCEDURE — 96374 THER/PROPH/DIAG INJ IV PUSH: CPT

## 2021-03-20 PROCEDURE — 83735 ASSAY OF MAGNESIUM: CPT

## 2021-03-20 PROCEDURE — 80048 BASIC METABOLIC PNL TOTAL CA: CPT

## 2021-03-20 PROCEDURE — 84443 ASSAY THYROID STIM HORMONE: CPT

## 2021-03-20 PROCEDURE — 80076 HEPATIC FUNCTION PANEL: CPT

## 2021-03-20 PROCEDURE — 94640 AIRWAY INHALATION TREATMENT: CPT

## 2021-03-20 PROCEDURE — 83880 ASSAY OF NATRIURETIC PEPTIDE: CPT

## 2021-03-20 PROCEDURE — 83605 ASSAY OF LACTIC ACID: CPT

## 2021-03-20 PROCEDURE — 86140 C-REACTIVE PROTEIN: CPT

## 2021-03-20 PROCEDURE — 6370000000 HC RX 637 (ALT 250 FOR IP): Performed by: INTERNAL MEDICINE

## 2021-03-20 PROCEDURE — 72148 MRI LUMBAR SPINE W/O DYE: CPT

## 2021-03-20 PROCEDURE — 96372 THER/PROPH/DIAG INJ SC/IM: CPT

## 2021-03-20 PROCEDURE — 84100 ASSAY OF PHOSPHORUS: CPT

## 2021-03-20 PROCEDURE — 36415 COLL VENOUS BLD VENIPUNCTURE: CPT

## 2021-03-20 PROCEDURE — 93306 TTE W/DOPPLER COMPLETE: CPT

## 2021-03-20 PROCEDURE — 85651 RBC SED RATE NONAUTOMATED: CPT

## 2021-03-20 PROCEDURE — 80061 LIPID PANEL: CPT

## 2021-03-20 PROCEDURE — 83036 HEMOGLOBIN GLYCOSYLATED A1C: CPT

## 2021-03-20 RX ORDER — HYDROCODONE BITARTRATE AND ACETAMINOPHEN 5; 325 MG/1; MG/1
1 TABLET ORAL EVERY 4 HOURS PRN
Status: DISCONTINUED | OUTPATIENT
Start: 2021-03-20 | End: 2021-03-21 | Stop reason: HOSPADM

## 2021-03-20 RX ORDER — MORPHINE SULFATE 2 MG/ML
2 INJECTION, SOLUTION INTRAMUSCULAR; INTRAVENOUS EVERY 4 HOURS PRN
Status: DISCONTINUED | OUTPATIENT
Start: 2021-03-20 | End: 2021-03-21 | Stop reason: HOSPADM

## 2021-03-20 RX ORDER — GABAPENTIN 100 MG/1
200 CAPSULE ORAL 2 TIMES DAILY
Status: DISCONTINUED | OUTPATIENT
Start: 2021-03-20 | End: 2021-03-21

## 2021-03-20 RX ORDER — LORAZEPAM 2 MG/ML
1 INJECTION INTRAMUSCULAR
Status: DISPENSED | OUTPATIENT
Start: 2021-03-20 | End: 2021-03-20

## 2021-03-20 RX ORDER — LORAZEPAM 2 MG/ML
1 INJECTION INTRAMUSCULAR ONCE
Status: COMPLETED | OUTPATIENT
Start: 2021-03-20 | End: 2021-03-20

## 2021-03-20 RX ADMIN — CARBIDOPA AND LEVODOPA 1 TABLET: 25; 100 TABLET ORAL at 20:17

## 2021-03-20 RX ADMIN — GABAPENTIN 200 MG: 100 CAPSULE ORAL at 16:06

## 2021-03-20 RX ADMIN — GABAPENTIN 200 MG: 100 CAPSULE ORAL at 20:17

## 2021-03-20 RX ADMIN — ARFORMOTEROL TARTRATE 15 MCG: 15 SOLUTION RESPIRATORY (INHALATION) at 21:24

## 2021-03-20 RX ADMIN — ASPIRIN 81 MG: 81 TABLET, CHEWABLE ORAL at 08:25

## 2021-03-20 RX ADMIN — LORAZEPAM 1 MG: 2 INJECTION INTRAMUSCULAR; INTRAVENOUS at 14:56

## 2021-03-20 RX ADMIN — CARBIDOPA AND LEVODOPA 2 TABLET: 25; 100 TABLET ORAL at 16:07

## 2021-03-20 RX ADMIN — IPRATROPIUM BROMIDE AND ALBUTEROL SULFATE 1 AMPULE: 2.5; .5 SOLUTION RESPIRATORY (INHALATION) at 17:42

## 2021-03-20 RX ADMIN — GLIPIZIDE 2.5 MG: 5 TABLET ORAL at 16:06

## 2021-03-20 RX ADMIN — IPRATROPIUM BROMIDE AND ALBUTEROL SULFATE 1 AMPULE: 2.5; .5 SOLUTION RESPIRATORY (INHALATION) at 21:24

## 2021-03-20 RX ADMIN — PANTOPRAZOLE SODIUM 40 MG: 40 TABLET, DELAYED RELEASE ORAL at 05:01

## 2021-03-20 RX ADMIN — ACETAMINOPHEN 650 MG: 325 TABLET ORAL at 09:00

## 2021-03-20 RX ADMIN — RASAGILINE 1 MG: 1 TABLET ORAL at 08:25

## 2021-03-20 RX ADMIN — HYDROCODONE BITARTRATE AND ACETAMINOPHEN 1 TABLET: 5; 325 TABLET ORAL at 09:44

## 2021-03-20 RX ADMIN — ENOXAPARIN SODIUM 40 MG: 40 INJECTION SUBCUTANEOUS at 08:25

## 2021-03-20 RX ADMIN — CARBIDOPA AND LEVODOPA 2 TABLET: 25; 100 TABLET ORAL at 08:25

## 2021-03-20 RX ADMIN — BUDESONIDE 250 MCG: 0.25 SUSPENSION RESPIRATORY (INHALATION) at 21:24

## 2021-03-20 RX ADMIN — CARBIDOPA AND LEVODOPA 1 TABLET: 25; 100 TABLET ORAL at 12:09

## 2021-03-20 RX ADMIN — IPRATROPIUM BROMIDE AND ALBUTEROL SULFATE 1 AMPULE: 2.5; .5 SOLUTION RESPIRATORY (INHALATION) at 13:45

## 2021-03-20 ASSESSMENT — PAIN DESCRIPTION - ONSET: ONSET: ON-GOING

## 2021-03-20 ASSESSMENT — PAIN - FUNCTIONAL ASSESSMENT: PAIN_FUNCTIONAL_ASSESSMENT: PREVENTS OR INTERFERES SOME ACTIVE ACTIVITIES AND ADLS

## 2021-03-20 ASSESSMENT — PAIN DESCRIPTION - PROGRESSION: CLINICAL_PROGRESSION: RAPIDLY WORSENING

## 2021-03-20 ASSESSMENT — PAIN DESCRIPTION - FREQUENCY
FREQUENCY: CONTINUOUS
FREQUENCY: CONTINUOUS

## 2021-03-20 ASSESSMENT — PAIN SCALES - GENERAL
PAINLEVEL_OUTOF10: 9
PAINLEVEL_OUTOF10: 0

## 2021-03-20 ASSESSMENT — PAIN DESCRIPTION - PAIN TYPE: TYPE: NEUROPATHIC PAIN

## 2021-03-20 NOTE — PROGRESS NOTES
Dr. Tanesha Hilliard paged re: patient having 10/10 bilateral foot pain and Neurontin order correction. Bilateral pedal pulses are palpable, bilateral lower extremities are warm to touch. Patient's VSS. See new orders.
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
## 2021-03-20 NOTE — PROGRESS NOTES
P Quality Flow/Interdisciplinary Rounds Progress Note        Quality Flow Rounds held on March 20, 2021    Disciplines Attending:  Bedside Nurse,  and     Angely Pugh was admitted on 3/19/2021 10:33 AM    Anticipated Discharge Date:  Expected Discharge Date: 03/19/21    Disposition:    Ray Score:  Ray Scale Score: 20    Readmission Risk              Risk of Unplanned Readmission:        0           Discussed patient goal for the day, patient clinical progression, and barriers to discharge. The following Goal(s) of the Day/Commitment(s) have been identified:  pain control.       Naila Lund  March 20, 2021

## 2021-03-21 ENCOUNTER — APPOINTMENT (OUTPATIENT)
Dept: NUCLEAR MEDICINE | Age: 76
End: 2021-03-21
Payer: MEDICARE

## 2021-03-21 VITALS
RESPIRATION RATE: 17 BRPM | OXYGEN SATURATION: 97 % | BODY MASS INDEX: 29.12 KG/M2 | HEIGHT: 72 IN | TEMPERATURE: 98.4 F | DIASTOLIC BLOOD PRESSURE: 83 MMHG | SYSTOLIC BLOOD PRESSURE: 136 MMHG | WEIGHT: 215 LBS | HEART RATE: 99 BPM

## 2021-03-21 PROBLEM — M54.16 ACUTE LEFT LUMBAR RADICULOPATHY: Status: ACTIVE | Noted: 2021-03-21

## 2021-03-21 LAB
BASOPHILS ABSOLUTE: 0.03 E9/L (ref 0–0.2)
BASOPHILS RELATIVE PERCENT: 0.8 % (ref 0–2)
C-REACTIVE PROTEIN: 0.3 MG/DL (ref 0–0.4)
EOSINOPHILS ABSOLUTE: 0.07 E9/L (ref 0.05–0.5)
EOSINOPHILS RELATIVE PERCENT: 1.8 % (ref 0–6)
HCT VFR BLD CALC: 44 % (ref 37–54)
HEMOGLOBIN: 14.4 G/DL (ref 12.5–16.5)
IMMATURE GRANULOCYTES #: 0.01 E9/L
IMMATURE GRANULOCYTES %: 0.3 % (ref 0–5)
LYMPHOCYTES ABSOLUTE: 0.99 E9/L (ref 1.5–4)
LYMPHOCYTES RELATIVE PERCENT: 25.1 % (ref 20–42)
MCH RBC QN AUTO: 31.5 PG (ref 26–35)
MCHC RBC AUTO-ENTMCNC: 32.7 % (ref 32–34.5)
MCV RBC AUTO: 96.3 FL (ref 80–99.9)
MONOCYTES ABSOLUTE: 0.41 E9/L (ref 0.1–0.95)
MONOCYTES RELATIVE PERCENT: 10.4 % (ref 2–12)
NEUTROPHILS ABSOLUTE: 2.44 E9/L (ref 1.8–7.3)
NEUTROPHILS RELATIVE PERCENT: 61.6 % (ref 43–80)
PDW BLD-RTO: 12.9 FL (ref 11.5–15)
PLATELET # BLD: 119 E9/L (ref 130–450)
PMV BLD AUTO: 9.9 FL (ref 7–12)
RBC # BLD: 4.57 E12/L (ref 3.8–5.8)
SEDIMENTATION RATE, ERYTHROCYTE: 2 MM/HR (ref 0–15)
WBC # BLD: 4 E9/L (ref 4.5–11.5)

## 2021-03-21 PROCEDURE — 6360000002 HC RX W HCPCS: Performed by: INTERNAL MEDICINE

## 2021-03-21 PROCEDURE — G0378 HOSPITAL OBSERVATION PER HR: HCPCS

## 2021-03-21 PROCEDURE — 85025 COMPLETE CBC W/AUTO DIFF WBC: CPT

## 2021-03-21 PROCEDURE — 6370000000 HC RX 637 (ALT 250 FOR IP): Performed by: INTERNAL MEDICINE

## 2021-03-21 PROCEDURE — 86140 C-REACTIVE PROTEIN: CPT

## 2021-03-21 PROCEDURE — 85651 RBC SED RATE NONAUTOMATED: CPT

## 2021-03-21 PROCEDURE — 96372 THER/PROPH/DIAG INJ SC/IM: CPT

## 2021-03-21 PROCEDURE — A9503 TC99M MEDRONATE: HCPCS | Performed by: RADIOLOGY

## 2021-03-21 PROCEDURE — 36415 COLL VENOUS BLD VENIPUNCTURE: CPT

## 2021-03-21 PROCEDURE — 94640 AIRWAY INHALATION TREATMENT: CPT

## 2021-03-21 PROCEDURE — 78306 BONE IMAGING WHOLE BODY: CPT

## 2021-03-21 PROCEDURE — 3430000000 HC RX DIAGNOSTIC RADIOPHARMACEUTICAL: Performed by: RADIOLOGY

## 2021-03-21 RX ORDER — GABAPENTIN 300 MG/1
300 CAPSULE ORAL 3 TIMES DAILY
Status: DISCONTINUED | OUTPATIENT
Start: 2021-03-21 | End: 2021-03-21 | Stop reason: HOSPADM

## 2021-03-21 RX ORDER — GABAPENTIN 300 MG/1
300 CAPSULE ORAL 3 TIMES DAILY
Qty: 90 CAPSULE | Refills: 3 | Status: SHIPPED | OUTPATIENT
Start: 2021-03-21 | End: 2021-11-16

## 2021-03-21 RX ORDER — HYDROCODONE BITARTRATE AND ACETAMINOPHEN 5; 325 MG/1; MG/1
1 TABLET ORAL EVERY 4 HOURS PRN
Qty: 60 TABLET | Refills: 0 | Status: SHIPPED | OUTPATIENT
Start: 2021-03-21 | End: 2021-05-20

## 2021-03-21 RX ORDER — TC 99M MEDRONATE 20 MG/10ML
25 INJECTION, POWDER, LYOPHILIZED, FOR SOLUTION INTRAVENOUS
Status: COMPLETED | OUTPATIENT
Start: 2021-03-21 | End: 2021-03-21

## 2021-03-21 RX ADMIN — IPRATROPIUM BROMIDE AND ALBUTEROL SULFATE 1 AMPULE: 2.5; .5 SOLUTION RESPIRATORY (INHALATION) at 10:00

## 2021-03-21 RX ADMIN — TC 99M MEDRONATE 25 MILLICURIE: 20 INJECTION, POWDER, LYOPHILIZED, FOR SOLUTION INTRAVENOUS at 08:15

## 2021-03-21 RX ADMIN — GLIPIZIDE 2.5 MG: 5 TABLET ORAL at 16:18

## 2021-03-21 RX ADMIN — CARBIDOPA AND LEVODOPA 1 TABLET: 25; 100 TABLET ORAL at 12:29

## 2021-03-21 RX ADMIN — BUDESONIDE 250 MCG: 0.25 SUSPENSION RESPIRATORY (INHALATION) at 10:00

## 2021-03-21 RX ADMIN — IPRATROPIUM BROMIDE AND ALBUTEROL SULFATE 1 AMPULE: 2.5; .5 SOLUTION RESPIRATORY (INHALATION) at 13:05

## 2021-03-21 RX ADMIN — GLIPIZIDE 2.5 MG: 5 TABLET ORAL at 08:24

## 2021-03-21 RX ADMIN — CARBIDOPA AND LEVODOPA 2 TABLET: 25; 100 TABLET ORAL at 16:18

## 2021-03-21 RX ADMIN — PANTOPRAZOLE SODIUM 40 MG: 40 TABLET, DELAYED RELEASE ORAL at 06:02

## 2021-03-21 RX ADMIN — ARFORMOTEROL TARTRATE 15 MCG: 15 SOLUTION RESPIRATORY (INHALATION) at 10:00

## 2021-03-21 RX ADMIN — ENOXAPARIN SODIUM 40 MG: 40 INJECTION SUBCUTANEOUS at 08:24

## 2021-03-21 RX ADMIN — HYDROCODONE BITARTRATE AND ACETAMINOPHEN 1 TABLET: 5; 325 TABLET ORAL at 00:15

## 2021-03-21 RX ADMIN — GABAPENTIN 300 MG: 300 CAPSULE ORAL at 16:18

## 2021-03-21 RX ADMIN — CARBIDOPA AND LEVODOPA 2 TABLET: 25; 100 TABLET ORAL at 08:24

## 2021-03-21 RX ADMIN — RASAGILINE 1 MG: 1 TABLET ORAL at 08:24

## 2021-03-21 RX ADMIN — ASPIRIN 81 MG: 81 TABLET, CHEWABLE ORAL at 08:24

## 2021-03-21 ASSESSMENT — PAIN SCALES - GENERAL: PAINLEVEL_OUTOF10: 0

## 2021-03-21 NOTE — CARE COORDINATION
CASE MANAGEMENT. ... Chart reviewed. COVID NEG 3/19. Spoke with the patient and his wife over the phone. Patient voices being independent. Uses a cane prn and has glucometer/supplies at home. Follows with Dr Fran Buitrago and uses [facility] in [place]. Mr Shayne Yancey is admitted with dyspnea/rsv and is in contact / droplet iso. He is tolerating room air. Is receiving ivf and aerosols. Nebs are new, so an order for nebulizer will be required if needed at discharge. Has no casey history and has used [facility] in the past. No needs noted for discharge, other than poss nebulizer. Will cont to follow along and assist with needs accordingly.

## 2021-03-21 NOTE — PROGRESS NOTES
PROGRESS  NOTE --                                                          INTERNAL  MEDICINE                                                                              I  PERSONALLY SAW , EXAMINED, AND CARED [id number], 3/21/2021     LABS, XRAY ,CHART, AND MEDICATIONS  REVIEWED BY ME       Chief complaint: Dyspnea, lumbar radiculopathy      3/21/2021-SUBJECTIVE: Adelaida Reynaga is alert awake and cooperative; oriented ×3. Denies any chest pain dyspnea nausea emesis. Tolerating diet. No abdominal pain. Wife present through the exam; he has had complete resolution of his lumbar radiculopathy. Again, no complaints of dyspnea. Bone scan just resulted, no evidence to suggest osseous metastatic disease. Appetite good. Afebrile last 24 hours. Blood pressure 136/83.  97% saturation on room air. Intake and output +20 cc. A1c 6.0 hemoglobin 14.4 WBC 4.0 CRP 0.3 remainder of labs pending. MRI lumbar spine as follows--    FINDINGS:   BONES/ALIGNMENT: There is normal alignment of the spine. The vertebral body   heights are maintained. The bone marrow signal appears unremarkable. SPINAL CORD: The conus terminates normally. SOFT TISSUES: No paraspinal mass identified. L1-L2: There is no significant disc herniation, spinal canal stenosis or   neural foraminal narrowing. L2-L3: Moderate loss of disc height and signal with endplate degenerative   marrow change.  3 mm disc bulging effaces the anterior thecal sac.  Moderate   bilateral set arthropathy.  Moderate bilateral neural foraminal narrowing. L3-L4: Grade 1 retrolisthesis.  Moderate bilateral facet arthropathy. Moderate bilateral neural foraminal narrowing.  Mild canal stenosis.      L4-L5: Grade 1 retrolisthesis, posterior uncovertebral hypertrophy.  Moderate   bilateral facet arthropathy.  Findings result in mild canal stenosis and   severe right and mild left neural foraminal narrowing     L5-S1: Grade 1 anterolisthesis with 2 mm disc bulge effaces the anterior   thecal sac.  Severe bilateral facet arthropathy and ligamentum flavum   thickening.  Findings resulting in moderate bilateral neural foraminal   narrowing.       Impression:       No evidence of marrow infiltrative lesion or metastasis to the lumbar spine. At L4-L5, severe right neural foraminal narrowing. At L2-L3, L3-L4 and L5-S1, moderate bilateral neural foraminal narrowing. Other mild degenerative changes of lumbar spine as described. Results of 2D echo as follows--     Summary   No evidence of tricuspid regurgitation. Left ventricle grossly normal in size. Proximal septal thickening. Borderline left ventricular concentric hypertrophy noted. Estimated left ventricular ejection fraction is 60±5%. <50% criteria for diastolic dysfunction. visualized. Physiologic and/or trace pulmonic regurgitation present. No evidence of tricuspid regurgitation. Unable to accurately assess RV systolic pressure. Technically suboptimal quality study. No comparison study available. Suggest clinical correlation.     Objective:     PHYSICAL EXAM:    VS: /83   Pulse 99   Temp 98.4 °F (36.9 °C) (Oral)   Resp 17   Ht 6' (1.829 m)   Wt 215 lb (97.5 kg)   SpO2 97%   BMI 29.16 kg/m²     Labs:   CBC:   Lab Results   Component Value Date    WBC 4.0 03/21/2021    RBC 4.57 03/21/2021    HGB 14.4 03/21/2021    HCT 44.0 03/21/2021    MCV 96.3 03/21/2021    MCH 31.5 03/21/2021    MCHC 32.7 03/21/2021    RDW 12.9 03/21/2021     03/21/2021    MPV 9.9 03/21/2021     CBC with Differential:    Lab Results   Component Value Date    WBC 4.0 03/21/2021    RBC 4.57 03/21/2021    HGB 14.4 03/21/2021    HCT 44.0 03/21/2021     03/21/2021    MCV 96.3 03/21/2021    MCH 31.5 03/21/2021    MCHC 32.7 03/21/2021    RDW 12.9 03/21/2021    LYMPHOPCT 25.1 03/21/2021    MONOPCT 10.4 03/21/2021    BASOPCT 0.8 03/21/2021    MONOSABS 0.41 03/21/2021    LYMPHSABS 0.99 03/21/2021    EOSABS 0.07 03/21/2021    BASOSABS 0.03 03/21/2021     Hemoglobin/Hematocrit:    Lab Results   Component Value Date    HGB 14.4 03/21/2021    HCT 44.0 03/21/2021     CMP:    Lab Results   Component Value Date     03/20/2021    K 4.2 03/20/2021    K 4.9 08/17/2020     03/20/2021    CO2 24 03/20/2021    BUN 17 03/20/2021    CREATININE 1.4 03/20/2021    GFRAA 59 03/20/2021    LABGLOM 49 03/20/2021    GLUCOSE 125 03/20/2021    PROT 6.6 03/20/2021    LABALBU 4.0 03/20/2021    CALCIUM 9.3 03/20/2021    BILITOT 0.7 03/20/2021    ALKPHOS 61 03/20/2021    AST 14 03/20/2021    ALT <5 03/20/2021     BMP:    Lab Results   Component Value Date     03/20/2021    K 4.2 03/20/2021    K 4.9 08/17/2020     03/20/2021    CO2 24 03/20/2021    BUN 17 03/20/2021    LABALBU 4.0 03/20/2021    CREATININE 1.4 03/20/2021    CALCIUM 9.3 03/20/2021    GFRAA 59 03/20/2021    LABGLOM 49 03/20/2021    GLUCOSE 125 03/20/2021     Hepatic Function Panel:    Lab Results   Component Value Date    ALKPHOS 61 03/20/2021    ALT <5 03/20/2021    AST 14 03/20/2021    PROT 6.6 03/20/2021    BILITOT 0.7 03/20/2021    BILIDIR <0.2 03/20/2021    IBILI see below 03/20/2021    LABALBU 4.0 03/20/2021     Ionized Calcium:  No results found for: IONCA  Magnesium:    Lab Results   Component Value Date    MG 1.9 03/20/2021     Phosphorus:    Lab Results   Component Value Date    PHOS 3.0 03/20/2021     LDH:  No results found for: LDH  Uric Acid:    Lab Results   Component Value Date    LABURIC 7.3 03/20/2021     PT/INR:    Lab Results   Component Value Date    PROTIME 10.9 03/19/2021    INR 1.0 03/19/2021     Warfarin PT/INR:  No components found for: PTPATWAR, PTINRWAR  PTT:    Lab Results   Component Value Date    APTT 29.3 03/19/2021   [APTT}  Troponin:    Lab Results   Component Value Date    TROPONINI <0.01 03/19/2021     Last 3 Troponin: Lab Results   Component Value Date    TROPONINI <0.01 03/19/2021    TROPONINI <0.01 08/17/2020    TROPONINI <0.01 04/23/2019     U/A:    Lab Results   Component Value Date    COLORU Yellow 04/23/2019    PROTEINU Negative 04/23/2019    PHUR 6.5 04/23/2019    CLARITYU Clear 04/23/2019    SPECGRAV 1.010 04/23/2019    LEUKOCYTESUR Negative 04/23/2019    UROBILINOGEN 0.2 04/23/2019    BILIRUBINUR Negative 04/23/2019    BLOODU Negative 04/23/2019    GLUCOSEU Negative 04/23/2019     HgBA1c:    Lab Results   Component Value Date    LABA1C 6.0 03/20/2021     FLP:    Lab Results   Component Value Date    TRIG 69 03/20/2021    HDL 51 03/20/2021    LDLCALC 114 03/20/2021    LABVLDL 14 03/20/2021     TSH:    Lab Results   Component Value Date    TSH 1.600 03/20/2021     VITAMIN B12: No components found for: B12  FOLATE:    Lab Results   Component Value Date    FOLATE 13.5 03/19/2021     PSA: No results found for: PSA     General appearance: Alert, Awake, Oriented times 3, no distress  Skin: Warm and dry ; no rashes  Head: Normocephalic. No masses, lesions or tenderness noted  Eyes: Conjunctivae pink, sclera white. PERRL,EOM-I  Ears: External ears normal  Nose/Sinuses: Nares normal. Septum midline. Mucosa normal. No drainage  Oropharynx: Oropharynx clear with no exudate seen  Neck: Supple. No jugular venous distension, lymphadenopathy or thyromegaly Trachea midline  Lungs: Clear to auscultation bilaterally. No rhonchi, crackles or wheezes  Heart: S1 S2  Regular rate and rhythm. No rub, murmur or gallop  Abdomen: Soft, non-tender. BS normal. No masses, organomegaly; no rebound or guarding  Extremities: No edema, Peripheral pulses palpable; straight leg raising, range of motion of hips negative bilaterally  Musculoskeletal: Muscular strength appears intact. Neuro:  II-XII grossly intact .  POTTS equally; mild pill-rolling tremor both hands equivocal bradykinesia    TELEMETRY: REVIEWED--Telemetry: Sinus    ASSESSMENT:   Principal Problem:    Dyspnea  Active Problems:    Renal carcinoma (HCC)    Parkinson disease (HCC)    CKD (chronic kidney disease) stage 3, GFR 30-59 ml/min    Hx of deep venous thrombosis    History of pulmonary embolus (PE)    Rhinovirus infection    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  Resolved Problems:    * No resolved hospital problems. *      PLAN:  SEE ORDERS      RE  CHANGES AND FINDINGS   Medications reviewed with patient  GI prophylaxis  DVT prophylaxis  Nuclear bone scan pending  Aerosol treatments  Sinemet 25/100,1 tablet twice daily  Sinemet 25/100, 2 tablets twice daily  Glucotrol 2.5 mg twice daily  Increase gabapentin 300 mg twice daily  Rasagiline 1 mg tablet daily  Hydrocodone 5 mg every 4 hours as needed for severe pain  Med reconciliation completed  Prescriptions written  Hydrocodone 5 mg every 4 hours as needed for severe pain #60  Follow-up Dr. Dion Ramos 1 week  Follow-up pain clinic for probable epidurals  Follow-up neurology    Discussed with patient and spouse and nursing.       Dali Michael Vera DO     12:04 PM     3/21/2021    TIME > 25 MINUTES    >  50 %  OF  TIME  DISCUSSION               ------------  INFORMATION  -----------      DIET:DIET GENERAL;        Allergies   Allergen Reactions    Tape [Adhesive Tape]          MEDICATION SIDE EFFECTS:none       SCHEDULED MEDS:                                 Scheduled Meds:   gabapentin  200 mg Oral BID    sodium chloride flush  10 mL Intravenous Once    aspirin  81 mg Oral Daily    glipiZIDE  2.5 mg Oral BID AC    rasagiline mesylate  1 tablet Oral Daily    carbidopa-levodopa  1 tablet Oral BID    Arformoterol Tartrate  15 mcg Nebulization BID    budesonide  250 mcg Nebulization BID    ipratropium-albuterol  1 ampule Inhalation Q4H WA    enoxaparin  40 mg Subcutaneous Daily    pantoprazole  40 mg Oral QAM AC    carbidopa-levodopa  2 tablet Oral BID       Continuous Infusions:   sodium chloride 100 mL/hr at 03/19/21 2019 Data:       Intake/Output Summary (Last 24 hours) at 3/21/2021 1204  Last data filed at 3/20/2021 1331  Gross per 24 hour   Intake 360 ml   Output    Net 360 ml       Wt Readings from Last 3 Encounters:   03/19/21 215 lb (97.5 kg)   08/16/20 240 lb (108.9 kg)   04/23/19 235 lb (106.6 kg)       Labs: Additional    GLUCOSE:No results for input(s): POCGLU in the last 72 hours. BNP:No results found for: BNP    CRP:   Recent Labs     03/19/21  1820 03/20/21  0445 03/21/21  0535   CRP 0.3 0.3 0.3       ESR:  Recent Labs     03/19/21  1820 03/20/21  0445 03/21/21  0535   SEDRATE 2 1 2       RADIOLOGY: REVIEWED AVAILABLE REPORT  MRI LUMBAR SPINE WO CONTRAST   Final Result   No evidence of marrow infiltrative lesion or metastasis to the lumbar spine. At L4-L5, severe right neural foraminal narrowing. At L2-L3, L3-L4 and L5-S1, moderate bilateral neural foraminal narrowing. Other mild degenerative changes of lumbar spine as described. US DUP LOWER EXTREMITY LEFT TODD   Final Result   No evidence of DVT in the left lower extremity.          XR CHEST PORTABLE   Final Result   No acute pulmonary process         NM BONE SCAN WHOLE BODY    (Results Pending)             Aida Lyles DO   12:04 PM     3/21/2021      Voice recognition software used for dictation

## 2021-11-16 ENCOUNTER — HOSPITAL ENCOUNTER (EMERGENCY)
Age: 76
Discharge: LEFT BEFORE DISCHARGE | End: 2021-11-17
Attending: EMERGENCY MEDICINE
Payer: MEDICARE

## 2021-11-16 ENCOUNTER — APPOINTMENT (OUTPATIENT)
Dept: GENERAL RADIOLOGY | Age: 76
End: 2021-11-16
Payer: MEDICARE

## 2021-11-16 ENCOUNTER — APPOINTMENT (OUTPATIENT)
Dept: CT IMAGING | Age: 76
End: 2021-11-16
Payer: MEDICARE

## 2021-11-16 DIAGNOSIS — R42 DIZZINESS: ICD-10-CM

## 2021-11-16 DIAGNOSIS — R55 SYNCOPE AND COLLAPSE: Primary | ICD-10-CM

## 2021-11-16 PROBLEM — G31.9 CEREBRAL ATROPHY (HCC): Status: ACTIVE | Noted: 2021-11-16

## 2021-11-16 LAB
ALBUMIN SERPL-MCNC: 3.8 G/DL (ref 3.5–5.2)
ALP BLD-CCNC: 76 U/L (ref 40–129)
ALT SERPL-CCNC: <5 U/L (ref 0–40)
ANION GAP SERPL CALCULATED.3IONS-SCNC: 10 MMOL/L (ref 7–16)
AST SERPL-CCNC: 12 U/L (ref 0–39)
BACTERIA: ABNORMAL /HPF
BASOPHILS ABSOLUTE: 0.05 E9/L (ref 0–0.2)
BASOPHILS RELATIVE PERCENT: 0.6 % (ref 0–2)
BILIRUB SERPL-MCNC: 0.5 MG/DL (ref 0–1.2)
BILIRUBIN URINE: NEGATIVE
BLOOD, URINE: NEGATIVE
BUN BLDV-MCNC: 24 MG/DL (ref 6–23)
CALCIUM SERPL-MCNC: 9.1 MG/DL (ref 8.6–10.2)
CHLORIDE BLD-SCNC: 105 MMOL/L (ref 98–107)
CLARITY: CLEAR
CO2: 24 MMOL/L (ref 22–29)
COLOR: YELLOW
CREAT SERPL-MCNC: 1.6 MG/DL (ref 0.7–1.2)
EOSINOPHILS ABSOLUTE: 0.08 E9/L (ref 0.05–0.5)
EOSINOPHILS RELATIVE PERCENT: 0.9 % (ref 0–6)
GFR AFRICAN AMERICAN: 51
GFR NON-AFRICAN AMERICAN: 42 ML/MIN/1.73
GLUCOSE BLD-MCNC: 151 MG/DL (ref 74–99)
GLUCOSE URINE: NEGATIVE MG/DL
HCT VFR BLD CALC: 43.9 % (ref 37–54)
HEMOGLOBIN: 14.6 G/DL (ref 12.5–16.5)
HYALINE CASTS: ABNORMAL /LPF (ref 0–2)
IMMATURE GRANULOCYTES #: 0.03 E9/L
IMMATURE GRANULOCYTES %: 0.3 % (ref 0–5)
KETONES, URINE: NEGATIVE MG/DL
LEUKOCYTE ESTERASE, URINE: NEGATIVE
LYMPHOCYTES ABSOLUTE: 1.41 E9/L (ref 1.5–4)
LYMPHOCYTES RELATIVE PERCENT: 16 % (ref 20–42)
MCH RBC QN AUTO: 31.9 PG (ref 26–35)
MCHC RBC AUTO-ENTMCNC: 33.3 % (ref 32–34.5)
MCV RBC AUTO: 96.1 FL (ref 80–99.9)
MONOCYTES ABSOLUTE: 0.72 E9/L (ref 0.1–0.95)
MONOCYTES RELATIVE PERCENT: 8.2 % (ref 2–12)
NEUTROPHILS ABSOLUTE: 6.51 E9/L (ref 1.8–7.3)
NEUTROPHILS RELATIVE PERCENT: 74 % (ref 43–80)
NITRITE, URINE: NEGATIVE
PDW BLD-RTO: 12.6 FL (ref 11.5–15)
PH UA: 6 (ref 5–9)
PLATELET # BLD: 150 E9/L (ref 130–450)
PMV BLD AUTO: 9.7 FL (ref 7–12)
POTASSIUM REFLEX MAGNESIUM: 4.6 MMOL/L (ref 3.5–5)
PROTEIN UA: NORMAL MG/DL
RBC # BLD: 4.57 E12/L (ref 3.8–5.8)
RBC UA: ABNORMAL /HPF (ref 0–2)
SODIUM BLD-SCNC: 139 MMOL/L (ref 132–146)
SPECIFIC GRAVITY UA: 1.01 (ref 1–1.03)
TOTAL PROTEIN: 6.3 G/DL (ref 6.4–8.3)
TROPONIN, HIGH SENSITIVITY: 19 NG/L (ref 0–11)
UROBILINOGEN, URINE: 0.2 E.U./DL
WBC # BLD: 8.8 E9/L (ref 4.5–11.5)
WBC UA: ABNORMAL /HPF (ref 0–5)

## 2021-11-16 PROCEDURE — 70450 CT HEAD/BRAIN W/O DYE: CPT

## 2021-11-16 PROCEDURE — 84484 ASSAY OF TROPONIN QUANT: CPT

## 2021-11-16 PROCEDURE — 72131 CT LUMBAR SPINE W/O DYE: CPT

## 2021-11-16 PROCEDURE — 81001 URINALYSIS AUTO W/SCOPE: CPT

## 2021-11-16 PROCEDURE — 85025 COMPLETE CBC W/AUTO DIFF WBC: CPT

## 2021-11-16 PROCEDURE — 71045 X-RAY EXAM CHEST 1 VIEW: CPT

## 2021-11-16 PROCEDURE — 80053 COMPREHEN METABOLIC PANEL: CPT

## 2021-11-16 PROCEDURE — 99284 EMERGENCY DEPT VISIT MOD MDM: CPT

## 2021-11-16 PROCEDURE — 93005 ELECTROCARDIOGRAM TRACING: CPT | Performed by: EMERGENCY MEDICINE

## 2021-11-16 ASSESSMENT — PAIN DESCRIPTION - LOCATION: LOCATION: BACK

## 2021-11-16 ASSESSMENT — PAIN SCALES - GENERAL: PAINLEVEL_OUTOF10: 5

## 2021-11-16 ASSESSMENT — PAIN DESCRIPTION - PAIN TYPE: TYPE: ACUTE PAIN

## 2021-11-17 VITALS
BODY MASS INDEX: 30.48 KG/M2 | DIASTOLIC BLOOD PRESSURE: 98 MMHG | RESPIRATION RATE: 16 BRPM | TEMPERATURE: 97.9 F | WEIGHT: 225 LBS | OXYGEN SATURATION: 92 % | SYSTOLIC BLOOD PRESSURE: 165 MMHG | HEART RATE: 111 BPM | HEIGHT: 72 IN

## 2021-11-17 LAB
EKG ATRIAL RATE: 94 BPM
EKG P AXIS: 47 DEGREES
EKG P-R INTERVAL: 160 MS
EKG Q-T INTERVAL: 374 MS
EKG QRS DURATION: 90 MS
EKG QTC CALCULATION (BAZETT): 467 MS
EKG R AXIS: -16 DEGREES
EKG T AXIS: 29 DEGREES
EKG VENTRICULAR RATE: 94 BPM

## 2021-11-17 PROCEDURE — 93010 ELECTROCARDIOGRAM REPORT: CPT | Performed by: INTERNAL MEDICINE

## 2021-11-17 NOTE — ED NOTES
Patient stated he did not want to stay to be admitted to facility. Patient signed AMA release. Discharge instructions given and reviewed with patient and wife. Instructed to follow up with Dr Lynda Simon. Questions and concerns addressed. Pt departed ED in w/c with staff and wife in no apparent distress. Personal belongings taken.      Kai Morrison RN  11/17/21 [id number]

## 2021-11-17 NOTE — ED PROVIDER NOTES
HPI:  11/16/21,   Time: 7:25 PM COLIN Jean-Baptiste is a 64 y.o. male presenting to the ED for syncope, beginning 30 min ago. The complaint has been intermittent, moderate in severity, and worsened by nothing. Bib ems, syncope at home. Standing on back porch, then passed out, got lightheaded. Hit head, no fever/chills/sweats/n/v/d/abd pain/cp/cough congestoin    Review of Systems:   Pertinent positives and negatives are stated within HPI, all other systems reviewed and are negative.          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 Past Surgical History:  has a past surgical history that includes Cataract removal; back surgery; eye surgery; Endoscopy, colon, diagnostic; total nephrectomy (Right, 11/2013); IVC filter insertion (11/27/2013); and IVC filter removal (02/24/2014). Social History:  reports that he quit smoking about 41 years ago. His smoking use included cigars. He has never used smokeless tobacco. He reports current alcohol use. He reports that he does not use drugs. Family History: family history includes Cancer in his sister; Diabetes in his brother; Heart Disease in his father and mother. The patients home medications have been reviewed.     Allergies: Tape [adhesive tape]        ---------------------------------------------------PHYSICAL EXAM--------------------------------------    Constitutional/General: Alert and oriented x3, well appearing, non toxic in NAD  Head: Normocephalic and occipital hematoma  Eyes: PERRL, EOMI, conjunctive normal, sclera non icteric  Mouth: Oropharynx clear, handling secretions, no trismus, no asymmetry of the posterior oropharynx or uvular edema  Neck: Supple, full ROM, non tender to palpation in the midline, no stridor, no crepitus, no meningeal signs  Respiratory:  Not in respiratory distress  Cardiovascular:   2+ distal pulses  Chest: No chest wall tenderness  GI:  Abdomen Soft, Non tender, Non distended. Musculoskeletal: Moves all extremities x 4. Warm and well perfused, no clubbing, cyanosis, or edema. Capillary refill <3 seconds  Integument: skin warm and dry. No rashes. Lymphatic: no lymphadenopathy noted  Neurologic: GCS 15, no focal deficits, symmetric strength 5/5 in the upper and lower extremities bilaterally  Psychiatric: Normal Affect    -------------------------------------------------- RESULTS -------------------------------------------------  I have personally reviewed all laboratory and imaging results for this patient. Results are listed below.      LABS:  Results for orders placed or performed during the hospital encounter of 11/16/21   CBC Auto Differential   Result Value Ref Range    WBC 8.8 4.5 - 11.5 E9/L    RBC 4.57 3.80 - 5.80 E12/L    Hemoglobin 14.6 12.5 - 16.5 g/dL    Hematocrit 43.9 37.0 - 54.0 %    MCV 96.1 80.0 - 99.9 fL    MCH 31.9 26.0 - 35.0 pg    MCHC 33.3 32.0 - 34.5 %    RDW 12.6 11.5 - 15.0 fL    Platelets 484 785 - 420 E9/L    MPV 9.7 7.0 - 12.0 fL    Neutrophils % 74.0 43.0 - 80.0 %    Immature Granulocytes % 0.3 0.0 - 5.0 %    Lymphocytes % 16.0 (L) 20.0 - 42.0 %    Monocytes % 8.2 2.0 - 12.0 %    Eosinophils % 0.9 0.0 - 6.0 %    Basophils % 0.6 0.0 - 2.0 %    Neutrophils Absolute 6.51 1.80 - 7.30 E9/L    Immature Granulocytes # 0.03 E9/L    Lymphocytes Absolute 1.41 (L) 1.50 - 4.00 E9/L    Monocytes Absolute 0.72 0.10 - 0.95 E9/L    Eosinophils Absolute 0.08 0.05 - 0.50 E9/L    Basophils abnormality seen. There is multilevel moderate degenerative disc disease and facet arthropathy. No spinal stenosis. There is moderate right neuroforaminal narrowing at L4-5   and mild bilateral neuroforaminal narrowing L3-4. XR CHEST PORTABLE   Final Result   No acute process. EKG:  This EKG is signed and interpreted by the EP. Time: 1928  Rate: 95  Rhythm: Sinus  Interpretation: non-specific EKG  Comparison: None      ------------------------- NURSING NOTES AND VITALS REVIEWED ---------------------------   The nursing notes within the ED encounter and vital signs as below have been reviewed by myself. BP (!) 165/98   Pulse 111   Temp 97.9 °F (36.6 °C) (Oral)   Resp 16   Ht 6' (1.829 m)   Wt 225 lb (102.1 kg)   SpO2 92%   BMI 30.52 kg/m²   Oxygen Saturation Interpretation: Normal    The patients available past medical records and past encounters were reviewed. ------------------------------ ED COURSE/MEDICAL DECISION MAKING----------------------  Medications - No data to display      ED COURSE:  ED Course as of 11/18/21 1042   Tue Nov 16, 2021   2300 Discussed case with Dr. Kenan Fisher who will admit [CF]   2341 Was called into the patient's room to discuss further results and patient's family and patient stated he does not want to stay in the department to be admitted. I explained the risks of leaving including that we have not determine what the cause of the patient's syncope was and that this could lead to morbidity or mortality. Patient and patient's wife state understanding and but he still wanted to go home as he has appointment tomorrow with his PCP. They're agreeable to sign paperwork stating they're leaving [address] and were felt to be appropriate mental state to make decisions.   Patient signed [address] paperwork and patient signed out [address] [CF]      ED Course User Index  [CF] Hamilton Doe,        Medical Decision Making:    Pt syncope with age, wish to obs for syncope, but pt refuses, signs out ama        This patient's ED course included: a personal history and physicial examination    This patient has remained hemodynamically stable during their ED course. Re-Evaluations:             Re-evaluation. Patients symptoms show no change    Re-examination  11/16/21   7:25 PM EST          Vital Signs:   Vitals:    11/16/21 1946 11/17/21 0010   BP: 107/70 (!) 165/98   Pulse: 97 111   Resp: 18 16   Temp: 97.7 °F (36.5 °C) 97.9 °F (36.6 °C)   TempSrc: Oral Oral   SpO2: 94% 92%   Weight: 225 lb (102.1 kg)    Height: 6' (1.829 m)      Card/Pulm:  Rhythm: normal rate. Heart Sounds: no murmurs, gallops, or rubs. clear to auscultation, no wheezes or rales and unlabored breathing. Capillary Refill: normal.  Radial Pulse:  equal.  Skin:  Warm. Consultations:                 Critical Care:         Counseling: The emergency provider has spoken with the pt and discussed todays results, in addition to providing specific details for the plan of care and counseling regarding the diagnosis and prognosis. Questions are answered at this time and they are agreeable with the plan.       --------------------------------- IMPRESSION AND DISPOSITION ---------------------------------    IMPRESSION  1. Syncope and collapse    2. Dizziness        DISPOSITION  Disposition: Other Disposition: Left AMA  Patient condition is stable    NOTE: This report was transcribed using voice recognition software.  Every effort was made to ensure accuracy; however, inadvertent computerized transcription errors may be present        Mariam Fernandes MD  11/18/21 5949

## 2021-12-09 ENCOUNTER — HOSPITAL ENCOUNTER (OUTPATIENT)
Dept: GENERAL RADIOLOGY | Age: 76
Discharge: HOME | End: 2021-12-11
Payer: MEDICARE

## 2021-12-09 ENCOUNTER — HOSPITAL ENCOUNTER (OUTPATIENT)
Age: 76
Discharge: HOME | End: 2021-12-11
Payer: MEDICARE

## 2021-12-09 DIAGNOSIS — M25.562 LEFT KNEE PAIN, UNSPECIFIED CHRONICITY: ICD-10-CM

## 2021-12-09 PROCEDURE — 73564 X-RAY EXAM KNEE 4 OR MORE: CPT

## 2021-12-09 PROCEDURE — 72170 X-RAY EXAM OF PELVIS: CPT

## 2021-12-21 ENCOUNTER — HOSPITAL ENCOUNTER (INPATIENT)
Age: 76
LOS: 1 days | Discharge: HOME | DRG: 149 | End: 2021-12-22
Attending: EMERGENCY MEDICINE | Admitting: INTERNAL MEDICINE
Payer: MEDICARE

## 2021-12-21 ENCOUNTER — APPOINTMENT (OUTPATIENT)
Dept: CT IMAGING | Age: 76
DRG: 149 | End: 2021-12-21
Payer: MEDICARE

## 2021-12-21 ENCOUNTER — HOSPITAL ENCOUNTER (EMERGENCY)
Age: 76
Discharge: HOME | DRG: 149 | End: 2021-12-21
Attending: EMERGENCY MEDICINE
Payer: MEDICARE

## 2021-12-21 ENCOUNTER — APPOINTMENT (OUTPATIENT)
Dept: GENERAL RADIOLOGY | Age: 76
DRG: 149 | End: 2021-12-21
Payer: MEDICARE

## 2021-12-21 VITALS
RESPIRATION RATE: 18 BRPM | DIASTOLIC BLOOD PRESSURE: 88 MMHG | BODY MASS INDEX: 30.48 KG/M2 | SYSTOLIC BLOOD PRESSURE: 163 MMHG | HEART RATE: 96 BPM | TEMPERATURE: 97.4 F | OXYGEN SATURATION: 93 % | HEIGHT: 72 IN | WEIGHT: 225 LBS

## 2021-12-21 DIAGNOSIS — E86.0 DEHYDRATION: Primary | ICD-10-CM

## 2021-12-21 DIAGNOSIS — R11.2 NAUSEA VOMITING AND DIARRHEA: Primary | ICD-10-CM

## 2021-12-21 DIAGNOSIS — R19.7 NAUSEA VOMITING AND DIARRHEA: Primary | ICD-10-CM

## 2021-12-21 DIAGNOSIS — R26.2 UNABLE TO AMBULATE: ICD-10-CM

## 2021-12-21 PROBLEM — R42 VERTIGO: Status: ACTIVE | Noted: 2021-12-21

## 2021-12-21 LAB
ALBUMIN SERPL-MCNC: 3.8 G/DL (ref 3.5–5.2)
ALP BLD-CCNC: 77 U/L (ref 40–129)
ALT SERPL-CCNC: <5 U/L (ref 0–40)
ANION GAP SERPL CALCULATED.3IONS-SCNC: 12 MMOL/L (ref 7–16)
APTT: <20 SEC (ref 24.5–35.1)
AST SERPL-CCNC: 14 U/L (ref 0–39)
BASOPHILS ABSOLUTE: 0.02 E9/L (ref 0–0.2)
BASOPHILS RELATIVE PERCENT: 0.2 % (ref 0–2)
BILIRUB SERPL-MCNC: 0.6 MG/DL (ref 0–1.2)
BILIRUBIN URINE: NEGATIVE
BLOOD, URINE: NEGATIVE
BUN BLDV-MCNC: 18 MG/DL (ref 6–23)
CALCIUM SERPL-MCNC: 8.8 MG/DL (ref 8.6–10.2)
CHLORIDE BLD-SCNC: 103 MMOL/L (ref 98–107)
CLARITY: CLEAR
CO2: 21 MMOL/L (ref 22–29)
COLOR: YELLOW
CREAT SERPL-MCNC: 1.2 MG/DL (ref 0.7–1.2)
EKG ATRIAL RATE: 103 BPM
EKG P AXIS: 44 DEGREES
EKG P-R INTERVAL: 150 MS
EKG Q-T INTERVAL: 368 MS
EKG QRS DURATION: 92 MS
EKG QTC CALCULATION (BAZETT): 482 MS
EKG R AXIS: -22 DEGREES
EKG T AXIS: 21 DEGREES
EKG VENTRICULAR RATE: 103 BPM
EOSINOPHILS ABSOLUTE: 0.06 E9/L (ref 0.05–0.5)
EOSINOPHILS RELATIVE PERCENT: 0.6 % (ref 0–6)
GFR AFRICAN AMERICAN: >60
GFR NON-AFRICAN AMERICAN: 58 ML/MIN/1.73
GLUCOSE BLD-MCNC: 168 MG/DL (ref 74–99)
GLUCOSE URINE: NEGATIVE MG/DL
HCT VFR BLD CALC: 46.5 % (ref 37–54)
HEMOGLOBIN: 15.4 G/DL (ref 12.5–16.5)
IMMATURE GRANULOCYTES #: 0.04 E9/L
IMMATURE GRANULOCYTES %: 0.4 % (ref 0–5)
INR BLD: 1
KETONES, URINE: ABNORMAL MG/DL
LACTIC ACID: 1.8 MMOL/L (ref 0.5–2.2)
LEUKOCYTE ESTERASE, URINE: NEGATIVE
LIPASE: 46 U/L (ref 13–60)
LYMPHOCYTES ABSOLUTE: 0.35 E9/L (ref 1.5–4)
LYMPHOCYTES RELATIVE PERCENT: 3.3 % (ref 20–42)
MCH RBC QN AUTO: 31.9 PG (ref 26–35)
MCHC RBC AUTO-ENTMCNC: 33.1 % (ref 32–34.5)
MCV RBC AUTO: 96.3 FL (ref 80–99.9)
METER GLUCOSE: 128 MG/DL (ref 74–99)
METER GLUCOSE: 135 MG/DL (ref 74–99)
MONOCYTES ABSOLUTE: 0.48 E9/L (ref 0.1–0.95)
MONOCYTES RELATIVE PERCENT: 4.5 % (ref 2–12)
NEUTROPHILS ABSOLUTE: 9.62 E9/L (ref 1.8–7.3)
NEUTROPHILS RELATIVE PERCENT: 91 % (ref 43–80)
NITRITE, URINE: NEGATIVE
PDW BLD-RTO: 12.6 FL (ref 11.5–15)
PH UA: 6 (ref 5–9)
PLATELET # BLD: 131 E9/L (ref 130–450)
PMV BLD AUTO: 10.2 FL (ref 7–12)
POTASSIUM SERPL-SCNC: 4.6 MMOL/L (ref 3.5–5)
PROTEIN UA: NEGATIVE MG/DL
PROTHROMBIN TIME: 10.6 SEC (ref 9.3–12.4)
RBC # BLD: 4.83 E12/L (ref 3.8–5.8)
SARS-COV-2, NAAT: NOT DETECTED
SODIUM BLD-SCNC: 136 MMOL/L (ref 132–146)
SPECIFIC GRAVITY UA: 1.01 (ref 1–1.03)
TOTAL PROTEIN: 6.5 G/DL (ref 6.4–8.3)
TROPONIN, HIGH SENSITIVITY: 12 NG/L (ref 0–11)
TROPONIN, HIGH SENSITIVITY: 14 NG/L (ref 0–11)
UROBILINOGEN, URINE: 0.2 E.U./DL
WBC # BLD: 10.6 E9/L (ref 4.5–11.5)

## 2021-12-21 PROCEDURE — 99284 EMERGENCY DEPT VISIT MOD MDM: CPT

## 2021-12-21 PROCEDURE — 93010 ELECTROCARDIOGRAM REPORT: CPT | Performed by: INTERNAL MEDICINE

## 2021-12-21 PROCEDURE — 6360000002 HC RX W HCPCS: Performed by: PHYSICIAN ASSISTANT

## 2021-12-21 PROCEDURE — 83690 ASSAY OF LIPASE: CPT

## 2021-12-21 PROCEDURE — 80053 COMPREHEN METABOLIC PANEL: CPT

## 2021-12-21 PROCEDURE — 2580000003 HC RX 258: Performed by: PHYSICIAN ASSISTANT

## 2021-12-21 PROCEDURE — 1200000000 HC SEMI PRIVATE

## 2021-12-21 PROCEDURE — 82962 GLUCOSE BLOOD TEST: CPT

## 2021-12-21 PROCEDURE — 6370000000 HC RX 637 (ALT 250 FOR IP): Performed by: INTERNAL MEDICINE

## 2021-12-21 PROCEDURE — 85610 PROTHROMBIN TIME: CPT

## 2021-12-21 PROCEDURE — 84484 ASSAY OF TROPONIN QUANT: CPT

## 2021-12-21 PROCEDURE — 2580000003 HC RX 258: Performed by: INTERNAL MEDICINE

## 2021-12-21 PROCEDURE — 87635 SARS-COV-2 COVID-19 AMP PRB: CPT

## 2021-12-21 PROCEDURE — 81003 URINALYSIS AUTO W/O SCOPE: CPT

## 2021-12-21 PROCEDURE — 70450 CT HEAD/BRAIN W/O DYE: CPT

## 2021-12-21 PROCEDURE — G0378 HOSPITAL OBSERVATION PER HR: HCPCS

## 2021-12-21 PROCEDURE — 85025 COMPLETE CBC W/AUTO DIFF WBC: CPT

## 2021-12-21 PROCEDURE — 96360 HYDRATION IV INFUSION INIT: CPT

## 2021-12-21 PROCEDURE — 93005 ELECTROCARDIOGRAM TRACING: CPT | Performed by: PHYSICIAN ASSISTANT

## 2021-12-21 PROCEDURE — 99285 EMERGENCY DEPT VISIT HI MDM: CPT

## 2021-12-21 PROCEDURE — 85730 THROMBOPLASTIN TIME PARTIAL: CPT

## 2021-12-21 PROCEDURE — 96374 THER/PROPH/DIAG INJ IV PUSH: CPT

## 2021-12-21 PROCEDURE — 83605 ASSAY OF LACTIC ACID: CPT

## 2021-12-21 PROCEDURE — 71045 X-RAY EXAM CHEST 1 VIEW: CPT

## 2021-12-21 PROCEDURE — 2580000003 HC RX 258: Performed by: STUDENT IN AN ORGANIZED HEALTH CARE EDUCATION/TRAINING PROGRAM

## 2021-12-21 RX ORDER — ONDANSETRON 4 MG/1
4 TABLET, ORALLY DISINTEGRATING ORAL EVERY 8 HOURS PRN
Qty: 15 TABLET | Refills: 0 | Status: SHIPPED | OUTPATIENT
Start: 2021-12-21 | End: 2021-12-26

## 2021-12-21 RX ORDER — SODIUM CHLORIDE 9 MG/ML
1000 INJECTION, SOLUTION INTRAVENOUS CONTINUOUS
Status: DISCONTINUED | OUTPATIENT
Start: 2021-12-21 | End: 2021-12-22 | Stop reason: HOSPADM

## 2021-12-21 RX ORDER — ONDANSETRON 4 MG/1
4 TABLET, ORALLY DISINTEGRATING ORAL EVERY 8 HOURS PRN
Qty: 15 TABLET | Refills: 0 | Status: SHIPPED | OUTPATIENT
Start: 2021-12-21 | End: 2021-12-21 | Stop reason: SDUPTHER

## 2021-12-21 RX ORDER — GLIPIZIDE 5 MG/1
2.5 TABLET ORAL
Status: DISCONTINUED | OUTPATIENT
Start: 2021-12-22 | End: 2021-12-22 | Stop reason: HOSPADM

## 2021-12-21 RX ORDER — MECLIZINE HCL 12.5 MG/1
12.5 TABLET ORAL 3 TIMES DAILY PRN
Status: DISCONTINUED | OUTPATIENT
Start: 2021-12-21 | End: 2021-12-22 | Stop reason: HOSPADM

## 2021-12-21 RX ORDER — ONDANSETRON 2 MG/ML
4 INJECTION INTRAMUSCULAR; INTRAVENOUS EVERY 6 HOURS PRN
Status: DISCONTINUED | OUTPATIENT
Start: 2021-12-21 | End: 2021-12-22 | Stop reason: HOSPADM

## 2021-12-21 RX ORDER — ACETAMINOPHEN 325 MG/1
650 TABLET ORAL EVERY 4 HOURS PRN
Status: DISCONTINUED | OUTPATIENT
Start: 2021-12-21 | End: 2021-12-22 | Stop reason: HOSPADM

## 2021-12-21 RX ORDER — ONDANSETRON 4 MG/1
4 TABLET, ORALLY DISINTEGRATING ORAL EVERY 8 HOURS PRN
Status: DISCONTINUED | OUTPATIENT
Start: 2021-12-21 | End: 2021-12-22 | Stop reason: HOSPADM

## 2021-12-21 RX ORDER — SODIUM CHLORIDE 9 MG/ML
INJECTION, SOLUTION INTRAVENOUS CONTINUOUS
Status: DISCONTINUED | OUTPATIENT
Start: 2021-12-21 | End: 2021-12-22 | Stop reason: HOSPADM

## 2021-12-21 RX ORDER — PANTOPRAZOLE SODIUM 40 MG/1
40 TABLET, DELAYED RELEASE ORAL
Status: DISCONTINUED | OUTPATIENT
Start: 2021-12-22 | End: 2021-12-22 | Stop reason: HOSPADM

## 2021-12-21 RX ORDER — ONDANSETRON 2 MG/ML
4 INJECTION INTRAMUSCULAR; INTRAVENOUS ONCE
Status: COMPLETED | OUTPATIENT
Start: 2021-12-21 | End: 2021-12-21

## 2021-12-21 RX ORDER — NICOTINE POLACRILEX 4 MG
15 LOZENGE BUCCAL PRN
Status: DISCONTINUED | OUTPATIENT
Start: 2021-12-21 | End: 2021-12-22 | Stop reason: HOSPADM

## 2021-12-21 RX ORDER — DEXTROSE MONOHYDRATE 50 MG/ML
100 INJECTION, SOLUTION INTRAVENOUS PRN
Status: DISCONTINUED | OUTPATIENT
Start: 2021-12-21 | End: 2021-12-22 | Stop reason: HOSPADM

## 2021-12-21 RX ORDER — DEXTROSE MONOHYDRATE 25 G/50ML
12.5 INJECTION, SOLUTION INTRAVENOUS PRN
Status: DISCONTINUED | OUTPATIENT
Start: 2021-12-21 | End: 2021-12-22 | Stop reason: HOSPADM

## 2021-12-21 RX ORDER — 0.9 % SODIUM CHLORIDE 0.9 %
1000 INTRAVENOUS SOLUTION INTRAVENOUS ONCE
Status: COMPLETED | OUTPATIENT
Start: 2021-12-21 | End: 2021-12-22

## 2021-12-21 RX ORDER — SODIUM CHLORIDE, SODIUM LACTATE, POTASSIUM CHLORIDE, CALCIUM CHLORIDE 600; 310; 30; 20 MG/100ML; MG/100ML; MG/100ML; MG/100ML
1000 INJECTION, SOLUTION INTRAVENOUS ONCE
Status: COMPLETED | OUTPATIENT
Start: 2021-12-21 | End: 2021-12-21

## 2021-12-21 RX ORDER — RASAGILINE 1 MG/1
1 TABLET ORAL DAILY
Status: DISCONTINUED | OUTPATIENT
Start: 2021-12-22 | End: 2021-12-22 | Stop reason: HOSPADM

## 2021-12-21 RX ORDER — DIAZEPAM 2 MG/1
2 TABLET ORAL EVERY 6 HOURS PRN
Status: DISCONTINUED | OUTPATIENT
Start: 2021-12-21 | End: 2021-12-22 | Stop reason: HOSPADM

## 2021-12-21 RX ORDER — ASPIRIN 81 MG/1
81 TABLET, CHEWABLE ORAL DAILY
Status: DISCONTINUED | OUTPATIENT
Start: 2021-12-22 | End: 2021-12-22 | Stop reason: HOSPADM

## 2021-12-21 RX ADMIN — MECLIZINE 12.5 MG: 12.5 TABLET ORAL at 23:42

## 2021-12-21 RX ADMIN — SODIUM CHLORIDE, POTASSIUM CHLORIDE, SODIUM LACTATE AND CALCIUM CHLORIDE 1000 ML: 600; 310; 30; 20 INJECTION, SOLUTION INTRAVENOUS at 02:20

## 2021-12-21 RX ADMIN — SODIUM CHLORIDE: 9 INJECTION, SOLUTION INTRAVENOUS at 23:54

## 2021-12-21 RX ADMIN — ONDANSETRON 4 MG: 2 INJECTION INTRAMUSCULAR; INTRAVENOUS at 02:23

## 2021-12-21 RX ADMIN — SODIUM CHLORIDE 1000 ML: 9 INJECTION, SOLUTION INTRAVENOUS at 17:22

## 2021-12-21 ASSESSMENT — ENCOUNTER SYMPTOMS
EYE REDNESS: 0
DIARRHEA: 1
EYE PAIN: 0
NAUSEA: 1
SINUS PRESSURE: 0
WHEEZING: 0
COUGH: 0
ABDOMINAL PAIN: 0
EYE DISCHARGE: 0
BACK PAIN: 0
SORE THROAT: 0
SHORTNESS OF BREATH: 0
VOMITING: 1

## 2021-12-21 ASSESSMENT — PAIN DESCRIPTION - DESCRIPTORS: DESCRIPTORS: HEADACHE

## 2021-12-21 ASSESSMENT — PAIN SCALES - GENERAL
PAINLEVEL_OUTOF10: 0
PAINLEVEL_OUTOF10: 0
PAINLEVEL_OUTOF10: 8

## 2021-12-21 ASSESSMENT — PAIN DESCRIPTION - LOCATION: LOCATION: HEAD

## 2021-12-21 ASSESSMENT — PAIN DESCRIPTION - PAIN TYPE: TYPE: ACUTE PAIN

## 2021-12-21 NOTE — Clinical Note
Patient Class: Observation [104]   REQUIRED: Diagnosis: Unable to ambulate [157181]   Estimated Length of Stay: Estimated stay of less than 2 midnights   Admitting Provider: Lizzie Chan [[id number]]   Telemetry/Cardiac Monitoring Required?: Yes

## 2021-12-21 NOTE — ED TRIAGE NOTES
Patient complaining of Dizziness and inability to walk.  This is second time patient has been here today

## 2021-12-21 NOTE — ED PROVIDER NOTES
Patient presents with dizziness. He has a history of Parkinson's and is unable to communicate at this time. His wife states that he was here earlier this morning for nausea vomiting and diarrhea. She states that they were treated and eventually discharged with Zofran. She states that he has taken the Zofran and has not had any more nausea or vomiting however he has been unable to ambulate due to dizziness. She states that this has been constant since they were discharged. She believes that the symptoms are severe. He has never had this before. She denies him falling, hitting his head, or syncopizing. The history is provided by the spouse. The history is limited by the condition of the patient. No  was used. Review of Systems   Constitutional: Negative for chills and fever. HENT: Negative for ear pain, sinus pressure and sore throat. Eyes: Negative for pain, discharge and redness. Respiratory: Negative for cough, shortness of breath and wheezing. Cardiovascular: Negative for chest pain. Gastrointestinal: Positive for diarrhea (Resolved), nausea (Resolved) and vomiting (Resolved). Negative for abdominal pain. Genitourinary: Negative for dysuria and frequency. Musculoskeletal: Positive for gait problem. Negative for arthralgias and back pain. Skin: Negative for rash and wound. Neurological: Positive for dizziness. Negative for weakness and headaches. Hematological: Negative for adenopathy. All other systems reviewed and are negative. Physical Exam  Vitals and nursing note reviewed. Constitutional:       Appearance: He is well-developed. He is obese. HENT:      Head: Normocephalic and atraumatic. Eyes:      Conjunctiva/sclera: Conjunctivae normal.   Cardiovascular:      Rate and Rhythm: Regular rhythm. Tachycardia present. Heart sounds: Normal heart sounds. No murmur heard.       Pulmonary:      Effort: Pulmonary effort is normal. No respiratory distress. Breath sounds: Normal breath sounds. No wheezing or rales. Abdominal:      General: Bowel sounds are normal.      Palpations: Abdomen is soft. Tenderness: There is no abdominal tenderness. There is no guarding or rebound. Musculoskeletal:         General: No tenderness or deformity. Cervical back: Normal range of motion and neck supple. Skin:     General: Skin is warm and dry. Neurological:      Mental Status: He is alert and oriented to person, place, and time. Cranial Nerves: No cranial nerve deficit. Motor: Weakness present. Coordination: Coordination normal.          Procedures     MDM  Number of Diagnoses or Management Options  Dehydration  Unable to ambulate  Diagnosis management comments: Patient presents with weakness and inability to ambulate. He was evaluated earlier this morning for nausea, vomiting, and diarrhea. Nausea and vomiting is resolved. Patient is complaining of dizziness. Exam difficult to obtain secondary to his baseline parkinsons. CT of head was obtained and did not show any acute intracranial abnormalities. Patient was tachy and additional fluids were given. Patient however continued to have difficulty ambulating. Patient will be admitted for further evaluation and care of his weakness and inability to ambulate. Patient admitted to [facility]         Amount and/or Complexity of Data Reviewed  Clinical lab tests: reviewed  Tests in the radiology section of CPT®: reviewed                  --------------------------------------------- PAST HISTORY ---------------------------------------------  Past Medical History:  has a past medical history of Acute left lumbar radiculopathy, YUDY (acute kidney injury) ([facility] Anxiety and depression, Cancer ([facility] Cerebral atrophy ([address] Disc displacement, lumbar, History of pulmonary embolus (PE), Hx of deep venous thrombosis, Kidney disease, Nerve injury, Parkinson disease ([address] Pneumonia, Pulmonary embolism ([address] Renal carcinoma ([address] Rhinovirus infection, and 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 Past Surgical History:  has a past surgical history that includes Cataract removal; back surgery; eye surgery; Endoscopy, colon, diagnostic; total nephrectomy (Right, 11/2013); IVC filter insertion (11/27/2013); and IVC filter removal (02/24/2014). Social History:  reports that he quit smoking about 42 years ago. His smoking use included cigars. He has never used smokeless tobacco. He reports current alcohol use. He reports that he does not use drugs. Family History: family history includes Cancer in his sister; Diabetes in his brother; Heart Disease in his father and mother. The patients home medications have been reviewed. Allergies: Tape [adhesive tape]    -------------------------------------------------- RESULTS -------------------------------------------------    LABS:  Results for orders placed or performed during the hospital encounter of 12/21/21   Strep Pneumoniae Antigen    Specimen: Urine voided   Result Value Ref Range    STREP PNEUMONIAE ANTIGEN, URINE       Presumptive negative- suggests no current or recent  pneumococcal infection.   Infection due to Strep pneumoniae cannot be  ruled out since the antigen present in the sample  may be below the detection limit of the test.  Normal Range:Presumptive Negative     Respiratory Panel, Molecular, with COVID-19 (Restricted: peds pts or suitable admitted adults)    Specimen: Nasopharyngeal; Nasal   Result Value Ref Range    Adenovirus by PCR Not Detected Not Detected    Bordetella parapertussis by PCR Not Detected Not Detected    Bordetella pertussis by PCR Not Detected Not Detected    Chlamydophilia pneumoniae by PCR Not Detected Not Detected    Coronavirus 229E by PCR Not Detected Not Detected    Coronavirus HKU1 by PCR Not Detected Not Detected    Coronavirus NL63 by PCR Not Detected Not Detected    Coronavirus OC43 by PCR Not Detected Not Detected    SARS-CoV-2, PCR Not Detected Not Detected    Human Metapneumovirus by PCR Not Detected Not Detected    Human Rhinovirus/Enterovirus by PCR Not Detected Not Detected    Influenza A by PCR Not Detected Not Detected    Influenza B by PCR Not Detected Not Detected    Mycoplasma pneumoniae by PCR Not Detected Not Detected    Parainfluenza Virus 1 by PCR Not Detected Not Detected    Parainfluenza Virus 2 by PCR Not Detected Not Detected    Parainfluenza Virus 3 by PCR Not Detected Not Detected    Parainfluenza Virus 4 by PCR Not Detected Not Detected    Respiratory Syncytial Virus by PCR Not Detected Not Detected   Legionella antigen, urine    Specimen: Urine voided   Result Value Ref Range    L. pneumophila Serogp 1 Ur Ag       Presumptive Negative -suggesting no recent or current infections  with Legionella pneumophila serogroup 1. Infection to Legionella cannot be ruled out since other serogroups  and species may cause infection, antigen may not be present in  early infection, or level of antigen may be below the  detection limit.   Normal Range: Presumptive Negative     Basic Metabolic Panel   Result Value Ref Range    Sodium 135 132 - 146 mmol/L    Potassium 4.3 3.5 - 5.0 mmol/L    Chloride 105 98 - 107 mmol/L    CO2 23 22 - 29 mmol/L    Anion Gap 7 7 - 16 mmol/L    Glucose 138 (H) 74 - 99 mg/dL    BUN 16 6 - 23 mg/dL    CREATININE 1.1 0.7 - 1.2 mg/dL    GFR Non-African American >60 >=60 mL/min/1.73    GFR African American >60     Calcium 8.1 (L) 8.6 - 10.2 mg/dL   Brain Natriuretic Peptide   Result Value Ref Range    Pro- 0 - 450 pg/mL   Calcium, Ionized   Result Value Ref Range    Calcium, Ion 1.19 1.15 - 1.33 mmol/L   Magnesium   Result Value Ref Range    Magnesium 1.7 1.6 - 2.6 mg/dL   Lipid Panel   Result Value Ref Range    Cholesterol, Total 104 0 - 199 mg/dL    Triglycerides 59 0 - 149 mg/dL    HDL 50 >40 mg/dL    LDL Calculated 42 0 - 99 mg/dL    VLDL Cholesterol Calculated 12 mg/dL   Lactate, Sepsis   Result Value Ref Range    Lactic Acid, Sepsis 1.0 0.5 - 1.9 mmol/L   Iron and TIBC   Result Value Ref Range    Iron 49 (L) 59 - 158 mcg/dL    TIBC 257 250 - 450 mcg/dL    Iron Saturation 19 (L) 20 - 55 %   Hepatic Function Panel   Result Value Ref Range    Total Protein 5.5 (L) 6.4 - 8.3 g/dL    Albumin 3.2 (L) 3.5 - 5.2 g/dL    Alkaline Phosphatase 59 40 - 129 U/L    ALT <5 0 - 40 U/L    AST 16 0 - 39 U/L    Total Bilirubin 0.9 0.0 - 1.2 mg/dL    Bilirubin, Direct <0.2 0.0 - 0.3 mg/dL    Bilirubin, Indirect see below 0.0 - 1.0 mg/dL   Ferritin   Result Value Ref Range    Ferritin 372 ng/mL   Phosphorus   Result Value Ref Range    Phosphorus 2.2 (L) 2.5 - 4.5 mg/dL   Procalcitonin   Result Value Ref Range    Procalcitonin 0.37 (H) 0.00 - 0.08 ng/mL   Vitamin B12 & Folate   Result Value Ref Range    Vitamin B-12 278 211 - 946 pg/mL    Folate 9.4 4.8 - 24.2 ng/mL   Uric Acid   Result Value Ref Range    Uric Acid, Serum 5.8 3.4 - 7.0 mg/dL   TSH without Reflex   Result Value Ref Range    TSH 0.817 0.270 - 4.200 uIU/mL   Troponin   Result Value Ref Range    Troponin, High Sensitivity 20 (H) 0 - 11 ng/L   Troponin   Result Value Ref Range    Troponin, High Sensitivity 18 (H) 0 - 11 ng/L   Protime-INR   Result Value Ref Range    Protime 11.9 9.3 - 12.4 sec    INR 1.1    D-Dimer, Quantitative   Result Value Ref Range    D-Dimer, Quant 854 ng/mL DDU   SPECIMEN REJECTION   Result Value Ref Range    Rejected Test cbc a1c esr     Reason for Rejection see below    CBC WITH AUTO DIFFERENTIAL   Result Value Ref Range    WBC 5.9 4.5 - 11.5 E9/L    RBC 4.40 3.80 - 5.80 E12/L    Hemoglobin 14.2 12.5 - 16.5 g/dL    Hematocrit 43.0 37.0 - 54.0 %    MCV 97.7 80.0 - 99.9 fL    MCH 32.3 26.0 - 35.0 pg    MCHC 33.0 32.0 - 34.5 %    RDW 12.9 11.5 - 15.0 fL    Platelets 039 (L) 187 - 450 E9/L    MPV 9.9 7.0 - 12.0 fL    Neutrophils % 82.4 (H) 43.0 - 80.0 %    Immature Granulocytes % 0.7 0.0 - 5.0 % Lymphocytes % 9.2 (L) 20.0 - 42.0 %    Monocytes % 7.2 2.0 - 12.0 %    Eosinophils % 0.3 0.0 - 6.0 %    Basophils % 0.2 0.0 - 2.0 %    Neutrophils Absolute 4.83 1.80 - 7.30 E9/L    Immature Granulocytes # 0.04 E9/L    Lymphocytes Absolute 0.54 (L) 1.50 - 4.00 E9/L    Monocytes Absolute 0.42 0.10 - 0.95 E9/L    Eosinophils Absolute 0.02 (L) 0.05 - 0.50 E9/L    Basophils Absolute 0.01 0.00 - 0.20 E9/L    RBC Morphology Normal    Sedimentation Rate   Result Value Ref Range    Sed Rate 2 0 - 15 mm/Hr   Hemoglobin A1c   Result Value Ref Range    Hemoglobin A1C 6.7 (H) 4.0 - 5.6 %   POCT Glucose   Result Value Ref Range    Meter Glucose 135 (H) 74 - 99 mg/dL   POCT Glucose   Result Value Ref Range    Meter Glucose 128 (H) 74 - 99 mg/dL   POCT Glucose   Result Value Ref Range    Meter Glucose 118 (H) 74 - 99 mg/dL   POCT Glucose   Result Value Ref Range    Meter Glucose 123 (H) 74 - 99 mg/dL       RADIOLOGY:  CT Head WO Contrast   Final Result   1. No acute intracranial abnormality. 2. Chronic small vessel ischemic disease. 3. 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 RECOMMENDATIONS:   Unavailable         MRI BRAIN W WO CONTRAST    (Results Pending)         ------------------------- NURSING NOTES AND VITALS REVIEWED ---------------------------  Date / Time Roomed:  12/21/2021  3:23 PM  ED Bed Assignment:  [id number]    The nursing notes within the ED encounter and vital signs as below have been reviewed.      Patient Vitals for the past 24 hrs:   BP Temp Temp src Pulse Resp SpO2   12/22/21 0943 (!) 163/79 98.1 °F (36.7 °C) Oral 90 18 92 %   12/22/21 0830 (!) 162/77 97.6 °F (36.4 °C) Oral 89 18 92 %       Oxygen Saturation Interpretation: Normal    ------------------------------------------ PROGRESS NOTES ------------------------------------------  Re-evaluation(s):  Time: 1901  Patients symptoms show no change  Repeat physical examination is not changed    Counseling:  I have spoken with the patient and discussed todays results, in addition to providing specific details for the plan of care and counseling regarding the diagnosis and prognosis. Their questions are answered at this time and they are agreeable with the plan of admission.    --------------------------------- ADDITIONAL PROVIDER NOTES ---------------------------------  Consultations:  Time: 1941. Spoke with Dr. Irene Landin. Discussed case. They will admit the patient. This patient's ED course included: a personal history and physicial examination, multiple bedside re-evaluations and IV medications    This patient has remained hemodynamically stable during their ED course. Diagnosis:  1. Dehydration    2. Unable to ambulate        Disposition:  Patient's disposition: Admit to med/surg floor  Patient's condition is stable.       Patient was seen and evaluated by both myself and Ismael Flores, DO  Resident  12/23/21 7590

## 2021-12-21 NOTE — ED NOTES
Bed:  [place] [address]  Expected date:   Expected time:   Means of arrival:   Comments:  EMS     Dav Morrissey RN  12/21/21 8640

## 2021-12-21 NOTE — ED PROVIDER NOTES
ED Attending shared visit  CC: Judie     [facility]  Department of Emergency Medicine   ED  Encounter Note  Admit Date/RoomTime: 2021  1:41 AM  ED Room:     NAME: Mame Miranda  : 1945  MRN: [id number]     Chief Complaint:  Abdominal Pain (X 30 minutes), Emesis, and Diarrhea    History of Present Illness        Mame Miranda is a 64 y.o. old male who presents to the emergency department by ambulance, with sudden onset, intermittent episodes of nausea and vomiting of undigested food which began 30 minute(s) prior to arrival.  There has been no similar episodes in the past. The symptoms are associated with feeling weak. He reports he had been feeling dizzy all day today. Since inset the symptoms have been improving. His symptoms are aggravated by nothing and relieved by nothing. There has been no back pain, chest pain, shortness of breath, fever, sweating, blood in stool or dysuria. Pt wife reports she gave him an enema at 0100 due to him feeling like he  Needed to do BM but was unable. Shortly after the enema he had several episodes of watery diarrhea and several vomiting episode, wife reports vomitus was brown. Pt denies nausea currently. Patient is 90% on room air, he denies feeling short of breath. He reports he has had no upper respiratory symptoms. He is vaccinated and boosted, booster shot was about a month and a half ago they report. Patient reports he only has 1 kidney. ROS   Pertinent positives and negatives are stated within HPI, all other systems reviewed and are negative.     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 Surgical History:  has a past surgical history that includes Cataract removal; back surgery; eye surgery; Endoscopy, colon, diagnostic; total nephrectomy (Right, 11/2013); IVC filter insertion (11/27/2013); and IVC filter removal (02/24/2014). Social History:  reports that he quit smoking about 42 years ago. His smoking use included cigars. He has never used smokeless tobacco. He reports current alcohol use. He reports that he does not use drugs. Family History: family history includes Cancer in his sister; Diabetes in his brother; Heart Disease in his father and mother. Allergies: Tape [adhesive tape]    Physical Exam   Oxygen Saturation Interpretation: Normal on room air analysis. ED Triage Vitals   BP Temp Temp src Pulse Resp SpO2 Height Weight   121/64 97.3 -- 112 16 90 72\" 102 kg         Constitutional:  Alert, development consistent with age. HEENT:  NC/NT. Airway patent. Very dry oral mucosa, Lips are blue and warm (pt reports he has had blue lips for many years) Tm normal bilateral, PERRLA,  Neck:  Normal ROM. Supple. Respiratory:  Clear to auscultation and breath sounds equal.  CV:  Regular rate and rhythm, normal heart sounds, without pathological murmurs, ectopy, gallops, or rubs. GI:  normal appearing, non-distended with no visible hernias. Bowel sounds: normal bowel sounds. Tenderness: No abdominal tenderness, guarding, rebound, rigidity or pulsatile mass. .        Liver: non-tender. Rectal Examination deferred, N/A or declined by patient            Spleen:  non-tender. /Rectal: Rectal Examination deferred, N/A or declined by patient  Back: CVA Tenderness: No CVA tenderness. Integument:  Normal turgor. Warm, dry, without visible rash, unless noted elsewhere. Lymphatics: No lymphangitis or adenopathy noted. Neurological:  Oriented. Motor functions intact.     [employer] / [employer] Console Results   (All laboratory and radiology results have been personally reviewed by myself)  Labs:  Results for orders placed or performed during the hospital encounter of 12/21/21   COVID-19, Rapid    Specimen: Nasopharyngeal Swab   Result Value Ref Range    SARS-CoV-2, NAAT Not Detected Not Detected   CBC Auto Differential   Result Value Ref Range    WBC 10.6 4.5 - 11.5 E9/L    RBC 4.83 3.80 - 5.80 E12/L    Hemoglobin 15.4 12.5 - 16.5 g/dL    Hematocrit 46.5 37.0 - 54.0 %    MCV 96.3 80.0 - 99.9 fL    MCH 31.9 26.0 - 35.0 pg    MCHC 33.1 32.0 - 34.5 %    RDW 12.6 11.5 - 15.0 fL    Platelets 549 817 - 565 E9/L    MPV 10.2 7.0 - 12.0 fL    Neutrophils % 91.0 (H) 43.0 - 80.0 %    Immature Granulocytes % 0.4 0.0 - 5.0 %    Lymphocytes % 3.3 (L) 20.0 - 42.0 %    Monocytes % 4.5 2.0 - 12.0 %    Eosinophils % 0.6 0.0 - 6.0 %    Basophils % 0.2 0.0 - 2.0 %    Neutrophils Absolute 9.62 (H) 1.80 - 7.30 E9/L    Immature Granulocytes # 0.04 E9/L    Lymphocytes Absolute 0.35 (L) 1.50 - 4.00 E9/L    Monocytes Absolute 0.48 0.10 - 0.95 E9/L    Eosinophils Absolute 0.06 0.05 - 0.50 E9/L    Basophils Absolute 0.02 0.00 - 0.20 E9/L   Comprehensive Metabolic Panel   Result Value Ref Range    Sodium 136 132 - 146 mmol/L    Potassium 4.6 3.5 - 5.0 mmol/L    Chloride 103 98 - 107 mmol/L    CO2 21 (L) 22 - 29 mmol/L    Anion Gap 12 7 - 16 mmol/L    Glucose 168 (H) 74 - 99 mg/dL    BUN 18 6 - 23 mg/dL    CREATININE 1.2 0.7 - 1.2 mg/dL    GFR Non-African American 58 >=60 mL/min/1.73    GFR African American >60     Calcium 8.8 8.6 - 10.2 mg/dL    Total Protein 6.5 6.4 - 8.3 g/dL    Albumin 3.8 3.5 - 5.2 g/dL    Total Bilirubin 0.6 0.0 - 1.2 mg/dL    Alkaline Phosphatase 77 40 - 129 U/L    ALT <5 0 - 40 U/L    AST 14 0 - 39 U/L   Lactic Acid, Plasma   Result Value Ref Range    Lactic Acid 1.8 0.5 - 2.2 mmol/L   Lipase   Result Value Ref Range    Lipase 46 13 - 60 U/L   Protime-INR   Result Value Ref Range    Protime 10.6 9.3 - 12.4 sec    INR 1. 0    APTT   Result Value Ref Range    aPTT <20.0 (L) 24.5 - 35.1 sec   Troponin   Result Value Ref Range    Troponin, High Sensitivity 14 (H) 0 - 11 ng/L     EKG #1:  Interpreted by emergency department attending physician unless otherwise noted. 12/21/21  Time: 0208    Rhythm: sinus tachycardia  Rate: 100-110  Axis: normal  Conduction: normal  ST Segments: no acute change  T Waves: normal    Clinical Impression: non-specific EKG  Comparison to Prior tracings:  Today's ECG is unchanged from previous tracings. Imaging: All Radiology results interpreted by Radiologist unless otherwise noted. XR CHEST PORTABLE   Final Result   Similar chronic appearing findings. PA and lateral views would be useful for   further assessment, if symptoms persist.           ED Course / Medical Decision Making     Medications   lactated ringers infusion 1,000 mL (1,000 mLs IntraVENous New Bag 12/21/21 0220)   ondansetron (ZOFRAN) injection 4 mg (4 mg IntraVENous Given 12/21/21 0223)        Re-examination:  12/21/21       Time: 0335. He reports he feels a lot better. He has had no abdominal pain and he reports his headache is resolving. He has received 1 bag of LR and I am starting another of normal saline at this time. He denies any nausea or abdominal cramping currently. Oxygen is no longer on the patient -  had put him on 1 L when he came in because he was at 90%. I am wondering if maybe he was cold just coming in from EMS because his oxygen is now at 95% consistently and he is lying comfortably in the bed in no respiratory distress and reports no shortness of breath.     Consultations:             None    Procedure(s):   none    MDM:   PT presents to emergency via EMS from home when he had feelings of dizziness and nausea throughout the day but early this morning he asked his wife to give him an enema because he felt like he needed to go #2 but was unable to and after she did that he had several episodes of watery diarrhea. Of course this is consistent with having an enema and he also had several episodes of vomiting. She patient's wife also reports that she has been kind of nauseated today. Patient has been comfortable throughout the visit and has had no more nausea vomiting or diarrhea episodes. Repeat troponin is pending at this time and patient still has not urinated. Second liter of fluid is currently running and care is endorsed to attending physician Dr. Ondina Colorado at 0400. Plan of Care/Counseling:  Marito Judge PA-C and EM Attending Physician reviewed today's visit with the patient and spouse / life partner in addition to providing specific details for the plan of care and counseling regarding the diagnosis and prognosis. Questions are answered at this time and are agreeable with the plan. Assessment     1. Nausea vomiting and diarrhea      Plan   PER ATTENDING    New Medications     New Prescriptions    ONDANSETRON (ZOFRAN ODT) 4 MG DISINTEGRATING TABLET    Take 1 tablet by mouth every 8 hours as needed for Nausea or Vomiting     Electronically signed by Marito Judge PA-C   DD: 12/21/21  **This report was transcribed using voice recognition software. Every effort was made to ensure accuracy; however, inadvertent computerized transcription errors may be present.   END OF ED PROVIDER NOTE       Marito Judge PA-C  12/21/21 9038

## 2021-12-22 VITALS
WEIGHT: 227.51 LBS | TEMPERATURE: 98.1 F | SYSTOLIC BLOOD PRESSURE: 163 MMHG | HEIGHT: 72 IN | RESPIRATION RATE: 18 BRPM | BODY MASS INDEX: 30.82 KG/M2 | DIASTOLIC BLOOD PRESSURE: 79 MMHG | HEART RATE: 90 BPM | OXYGEN SATURATION: 92 %

## 2021-12-22 LAB
ADENOVIRUS BY PCR: NOT DETECTED
ALBUMIN SERPL-MCNC: 3.2 G/DL (ref 3.5–5.2)
ALP BLD-CCNC: 59 U/L (ref 40–129)
ALT SERPL-CCNC: <5 U/L (ref 0–40)
ANION GAP SERPL CALCULATED.3IONS-SCNC: 7 MMOL/L (ref 7–16)
AST SERPL-CCNC: 16 U/L (ref 0–39)
BASOPHILS ABSOLUTE: 0.01 E9/L (ref 0–0.2)
BASOPHILS RELATIVE PERCENT: 0.2 % (ref 0–2)
BILIRUB SERPL-MCNC: 0.9 MG/DL (ref 0–1.2)
BILIRUBIN DIRECT: <0.2 MG/DL (ref 0–0.3)
BILIRUBIN, INDIRECT: ABNORMAL MG/DL (ref 0–1)
BORDETELLA PARAPERTUSSIS BY PCR: NOT DETECTED
BORDETELLA PERTUSSIS BY PCR: NOT DETECTED
BUN BLDV-MCNC: 16 MG/DL (ref 6–23)
CALCIUM IONIZED: 1.19 MMOL/L (ref 1.15–1.33)
CALCIUM SERPL-MCNC: 8.1 MG/DL (ref 8.6–10.2)
CHLAMYDOPHILIA PNEUMONIAE BY PCR: NOT DETECTED
CHLORIDE BLD-SCNC: 105 MMOL/L (ref 98–107)
CHOLESTEROL, TOTAL: 104 MG/DL (ref 0–199)
CO2: 23 MMOL/L (ref 22–29)
CORONAVIRUS 229E BY PCR: NOT DETECTED
CORONAVIRUS HKU1 BY PCR: NOT DETECTED
CORONAVIRUS NL63 BY PCR: NOT DETECTED
CORONAVIRUS OC43 BY PCR: NOT DETECTED
CREAT SERPL-MCNC: 1.1 MG/DL (ref 0.7–1.2)
D DIMER: 854 NG/ML DDU
EOSINOPHILS ABSOLUTE: 0.02 E9/L (ref 0.05–0.5)
EOSINOPHILS RELATIVE PERCENT: 0.3 % (ref 0–6)
FERRITIN: 372 NG/ML
FOLATE: 9.4 NG/ML (ref 4.8–24.2)
GFR AFRICAN AMERICAN: >60
GFR NON-AFRICAN AMERICAN: >60 ML/MIN/1.73
GLUCOSE BLD-MCNC: 138 MG/DL (ref 74–99)
HBA1C MFR BLD: 6.7 % (ref 4–5.6)
HCT VFR BLD CALC: 43 % (ref 37–54)
HDLC SERPL-MCNC: 50 MG/DL
HEMOGLOBIN: 14.2 G/DL (ref 12.5–16.5)
HUMAN METAPNEUMOVIRUS BY PCR: NOT DETECTED
HUMAN RHINOVIRUS/ENTEROVIRUS BY PCR: NOT DETECTED
IMMATURE GRANULOCYTES #: 0.04 E9/L
IMMATURE GRANULOCYTES %: 0.7 % (ref 0–5)
INFLUENZA A BY PCR: NOT DETECTED
INFLUENZA B BY PCR: NOT DETECTED
INR BLD: 1.1
IRON SATURATION: 19 % (ref 20–55)
IRON: 49 MCG/DL (ref 59–158)
L. PNEUMOPHILA SEROGP 1 UR AG: NORMAL
LACTIC ACID, SEPSIS: 1 MMOL/L (ref 0.5–1.9)
LDL CHOLESTEROL CALCULATED: 42 MG/DL (ref 0–99)
LYMPHOCYTES ABSOLUTE: 0.54 E9/L (ref 1.5–4)
LYMPHOCYTES RELATIVE PERCENT: 9.2 % (ref 20–42)
MAGNESIUM: 1.7 MG/DL (ref 1.6–2.6)
MCH RBC QN AUTO: 32.3 PG (ref 26–35)
MCHC RBC AUTO-ENTMCNC: 33 % (ref 32–34.5)
MCV RBC AUTO: 97.7 FL (ref 80–99.9)
METER GLUCOSE: 118 MG/DL (ref 74–99)
METER GLUCOSE: 123 MG/DL (ref 74–99)
MONOCYTES ABSOLUTE: 0.42 E9/L (ref 0.1–0.95)
MONOCYTES RELATIVE PERCENT: 7.2 % (ref 2–12)
MYCOPLASMA PNEUMONIAE BY PCR: NOT DETECTED
NEUTROPHILS ABSOLUTE: 4.83 E9/L (ref 1.8–7.3)
NEUTROPHILS RELATIVE PERCENT: 82.4 % (ref 43–80)
PARAINFLUENZA VIRUS 1 BY PCR: NOT DETECTED
PARAINFLUENZA VIRUS 2 BY PCR: NOT DETECTED
PARAINFLUENZA VIRUS 3 BY PCR: NOT DETECTED
PARAINFLUENZA VIRUS 4 BY PCR: NOT DETECTED
PDW BLD-RTO: 12.9 FL (ref 11.5–15)
PHOSPHORUS: 2.2 MG/DL (ref 2.5–4.5)
PLATELET # BLD: 100 E9/L (ref 130–450)
PMV BLD AUTO: 9.9 FL (ref 7–12)
POTASSIUM SERPL-SCNC: 4.3 MMOL/L (ref 3.5–5)
PRO-BNP: 401 PG/ML (ref 0–450)
PROCALCITONIN: 0.37 NG/ML (ref 0–0.08)
PROTHROMBIN TIME: 11.9 SEC (ref 9.3–12.4)
RBC # BLD: 4.4 E12/L (ref 3.8–5.8)
RBC # BLD: NORMAL 10*6/UL
REASON FOR REJECTION: NORMAL
REJECTED TEST: NORMAL
RESPIRATORY SYNCYTIAL VIRUS BY PCR: NOT DETECTED
SARS-COV-2, PCR: NOT DETECTED
SEDIMENTATION RATE, ERYTHROCYTE: 2 MM/HR (ref 0–15)
SODIUM BLD-SCNC: 135 MMOL/L (ref 132–146)
STREP PNEUMONIAE ANTIGEN, URINE: NORMAL
TOTAL IRON BINDING CAPACITY: 257 MCG/DL (ref 250–450)
TOTAL PROTEIN: 5.5 G/DL (ref 6.4–8.3)
TRIGL SERPL-MCNC: 59 MG/DL (ref 0–149)
TROPONIN, HIGH SENSITIVITY: 18 NG/L (ref 0–11)
TROPONIN, HIGH SENSITIVITY: 20 NG/L (ref 0–11)
TSH SERPL DL<=0.05 MIU/L-ACNC: 0.82 UIU/ML (ref 0.27–4.2)
URIC ACID, SERUM: 5.8 MG/DL (ref 3.4–7)
VITAMIN B-12: 278 PG/ML (ref 211–946)
VLDLC SERPL CALC-MCNC: 12 MG/DL
WBC # BLD: 5.9 E9/L (ref 4.5–11.5)

## 2021-12-22 PROCEDURE — 83605 ASSAY OF LACTIC ACID: CPT

## 2021-12-22 PROCEDURE — 82962 GLUCOSE BLOOD TEST: CPT

## 2021-12-22 PROCEDURE — 83550 IRON BINDING TEST: CPT

## 2021-12-22 PROCEDURE — 82607 VITAMIN B-12: CPT

## 2021-12-22 PROCEDURE — 80048 BASIC METABOLIC PNL TOTAL CA: CPT

## 2021-12-22 PROCEDURE — 84145 PROCALCITONIN (PCT): CPT

## 2021-12-22 PROCEDURE — 80076 HEPATIC FUNCTION PANEL: CPT

## 2021-12-22 PROCEDURE — 82746 ASSAY OF FOLIC ACID SERUM: CPT

## 2021-12-22 PROCEDURE — 85651 RBC SED RATE NONAUTOMATED: CPT

## 2021-12-22 PROCEDURE — 85025 COMPLETE CBC W/AUTO DIFF WBC: CPT

## 2021-12-22 PROCEDURE — 85610 PROTHROMBIN TIME: CPT

## 2021-12-22 PROCEDURE — 84484 ASSAY OF TROPONIN QUANT: CPT

## 2021-12-22 PROCEDURE — 82728 ASSAY OF FERRITIN: CPT

## 2021-12-22 PROCEDURE — 36415 COLL VENOUS BLD VENIPUNCTURE: CPT

## 2021-12-22 PROCEDURE — 84443 ASSAY THYROID STIM HORMONE: CPT

## 2021-12-22 PROCEDURE — 83880 ASSAY OF NATRIURETIC PEPTIDE: CPT

## 2021-12-22 PROCEDURE — 2580000003 HC RX 258: Performed by: INTERNAL MEDICINE

## 2021-12-22 PROCEDURE — G0378 HOSPITAL OBSERVATION PER HR: HCPCS

## 2021-12-22 PROCEDURE — 84550 ASSAY OF BLOOD/URIC ACID: CPT

## 2021-12-22 PROCEDURE — 0202U NFCT DS 22 TRGT SARS-COV-2: CPT

## 2021-12-22 PROCEDURE — 85378 FIBRIN DEGRADE SEMIQUANT: CPT

## 2021-12-22 PROCEDURE — 84100 ASSAY OF PHOSPHORUS: CPT

## 2021-12-22 PROCEDURE — 83036 HEMOGLOBIN GLYCOSYLATED A1C: CPT

## 2021-12-22 PROCEDURE — 6360000002 HC RX W HCPCS: Performed by: INTERNAL MEDICINE

## 2021-12-22 PROCEDURE — 83735 ASSAY OF MAGNESIUM: CPT

## 2021-12-22 PROCEDURE — 97161 PT EVAL LOW COMPLEX 20 MIN: CPT

## 2021-12-22 PROCEDURE — 6370000000 HC RX 637 (ALT 250 FOR IP): Performed by: INTERNAL MEDICINE

## 2021-12-22 PROCEDURE — 82330 ASSAY OF CALCIUM: CPT

## 2021-12-22 PROCEDURE — 87449 NOS EACH ORGANISM AG IA: CPT

## 2021-12-22 PROCEDURE — 80061 LIPID PANEL: CPT

## 2021-12-22 PROCEDURE — 96372 THER/PROPH/DIAG INJ SC/IM: CPT

## 2021-12-22 PROCEDURE — 97165 OT EVAL LOW COMPLEX 30 MIN: CPT

## 2021-12-22 PROCEDURE — 83540 ASSAY OF IRON: CPT

## 2021-12-22 RX ORDER — MECLIZINE HCL 12.5 MG/1
12.5 TABLET ORAL 3 TIMES DAILY PRN
Qty: 20 TABLET | Refills: 1 | Status: SHIPPED | OUTPATIENT
Start: 2021-12-22 | End: 2022-01-01

## 2021-12-22 RX ORDER — CARBIDOPA AND LEVODOPA 25; 100 MG/1; MG/1
1 TABLET, ORALLY DISINTEGRATING ORAL 4 TIMES DAILY
Status: DISCONTINUED | OUTPATIENT
Start: 2021-12-22 | End: 2021-12-22

## 2021-12-22 RX ORDER — LORAZEPAM 1 MG/1
1 TABLET ORAL SEE ADMIN INSTRUCTIONS
Status: COMPLETED | OUTPATIENT
Start: 2021-12-22 | End: 2021-12-22

## 2021-12-22 RX ORDER — GABAPENTIN 300 MG/1
600 CAPSULE ORAL 3 TIMES DAILY
Status: DISCONTINUED | OUTPATIENT
Start: 2021-12-22 | End: 2021-12-22 | Stop reason: HOSPADM

## 2021-12-22 RX ADMIN — CARBIDOPA AND LEVODOPA 2 TABLET: 25; 100 TABLET ORAL at 15:38

## 2021-12-22 RX ADMIN — GABAPENTIN 600 MG: 300 CAPSULE ORAL at 15:38

## 2021-12-22 RX ADMIN — CARBIDOPA AND LEVODOPA 1 TABLET: 25; 100 TABLET ORAL at 00:46

## 2021-12-22 RX ADMIN — SODIUM CHLORIDE 1000 ML: 9 INJECTION, SOLUTION INTRAVENOUS at 00:00

## 2021-12-22 RX ADMIN — ONDANSETRON 4 MG: 2 INJECTION INTRAMUSCULAR; INTRAVENOUS at 02:39

## 2021-12-22 RX ADMIN — ASPIRIN 81 MG CHEWABLE TABLET 81 MG: 81 TABLET CHEWABLE at 08:33

## 2021-12-22 RX ADMIN — PANTOPRAZOLE SODIUM 40 MG: 40 TABLET, DELAYED RELEASE ORAL at 06:30

## 2021-12-22 RX ADMIN — RASAGILINE 1 MG: 1 TABLET ORAL at 08:33

## 2021-12-22 RX ADMIN — CARBIDOPA AND LEVODOPA 2 TABLET: 25; 100 TABLET ORAL at 08:33

## 2021-12-22 RX ADMIN — LORAZEPAM 1 MG: 1 TABLET ORAL at 13:07

## 2021-12-22 RX ADMIN — GLIPIZIDE 2.5 MG: 5 TABLET ORAL at 15:38

## 2021-12-22 RX ADMIN — ENOXAPARIN SODIUM 40 MG: 100 INJECTION SUBCUTANEOUS at 08:34

## 2021-12-22 RX ADMIN — MECLIZINE 12.5 MG: 12.5 TABLET ORAL at 10:32

## 2021-12-22 RX ADMIN — CARBIDOPA AND LEVODOPA 1 TABLET: 25; 100 TABLET ORAL at 13:07

## 2021-12-22 RX ADMIN — GLIPIZIDE 2.5 MG: 5 TABLET ORAL at 06:30

## 2021-12-22 ASSESSMENT — PAIN SCALES - GENERAL: PAINLEVEL_OUTOF10: 0

## 2021-12-22 NOTE — PROGRESS NOTES
Occupational Therapy  OCCUPATIONAL THERAPY INITIAL EVALUATION     [facility] [place], [place]        XQBA:                                                  Patient Name: Denise Zheng    MRN: [id number]    : 1945    Room: [facility]      Evaluating OT: David Leonardo OTR/L [id number]      Referring Provider: Jay Bowman DO    Specific Provider Orders/Date: OT eval and treat 21      Diagnosis:  Unable to ambulate [R26.2]  Vertigo [R42]      Pertinent Medical History: acute left lumbar radiculopathy, YUDY, [place], anxiety, depression, cerebral atrophy, PE, Parkinson's Disease       Precautions:  Fall Risk     Assessment of current deficits    [x] Functional mobility  [x]ADLs  [x] Strength               []Cognition    [x] Functional transfers   [x] IADLs         [x] Safety Awareness   [x]Endurance    [] Fine Coordination              [x] Balance      [] Vision/perception   []Sensation     []Gross Motor Coordination  [] ROM  [] Delirium                   [] Motor Control     OT PLAN OF CARE   OT POC based on physician orders, patient diagnosis and results of clinical assessment    Frequency/Duration 2-4 days/wk for 2 weeks PRN   Specific OT Treatment Interventions to include:   * Instruction/training on adapted ADL techniques and AE recommendations to increase functional independence within precautions       * Training on energy conservation strategies, correct breathing pattern and techniques to improve independence/tolerance for self-care routine  * Functional transfer/mobility training/DME recommendations for increased independence, safety, and fall prevention  * Patient/Family education to increase follow through with safety techniques and functional independence  * Recommendation of environmental modifications for increased safety with functional transfers/mobility and ADLs  * Therapeutic exercise to improve motor endurance, ROM, and functional strength for ADLs/functional transfers  * Therapeutic activities to facilitate/challenge dynamic balance, stand tolerance for increased safety and independence with ADLs        Recommended Adaptive Equipment: TBD     Home Living: Pt lives with wife in 1 story house with 1+3 FIDE and 1 hand rail. Bathroom setup: walk in shower with grab bars, elevated commode   Equipment owned: cane    Prior Level of Function: independent with ADLs , independent with IADLs; functional mobility: cane PRN    Pain Level: Pt did not report pain this date  Cognition: A&O: 4/4; WFL command follow demonstrated. Pt pleasant and agreeable with therapy   Memory:  fair   Sequencing:  fair   Problem solving:  fair   Judgement/safety:  fair     Functional Assessment:  AM-PAC Daily Activity Raw Score: 16/24   Initial Eval Status  Date: 12/22/21 Treatment Status  Date: STGs = LTGs  Time frame: 10-14 days   Feeding Set up     Grooming Min A  Mod I   UB Dressing Min A  Mod I   LB Dressing Mod A to don/doff shoes   Sup-with use of AD as appropriate/needed   Bathing Mod A  Sup -with use of AD as appropriate/needed   Toileting Min A  I    Bed Mobility  Supine to sit: Sup   Sit to supine: Sup      Functional Transfers SBA/CGA with no device  Sit<>stand from EOB  I    Functional Mobility CGA with no device  Short distance in room  Pt noted some dizziness with mobility which subsided once seated  I -with device as needed to maximize independence with ADLs and functional task completion   Balance Sitting:     Static:  Sup    Dynamic:SBA  Standing: CGA/SBA  I for dynamic/static sitting balance to maximize independence with ADLs and functional task completion    I for standing balance to maximize independence with ADLs and functional task completion   Activity Tolerance Fair with light activity  good with ADL activity.  Pt will demonstrate good understanding of education provided on EC/WS techniques    Visual/  Perceptual Glasses: none at bedside                Additional long-term goal: Pt will increase functional independence to PLOF to allow pt to live in least restrictive environment. Hand Dominance R   AROM (PROM) Strength   RUE  WFL 4/5   LUE WFL 4/5     Hearing: WFL   Sensation:  No c/o numbness or tingling   Tone: WFL   Edema: none noted    Comments: Upon arrival patient lying in bed with wife present. At end of session, patient returned to bed with call light and phone within reach, all lines and tubes intact, and wife present. Overall patient demonstrated decreased independence and safety during completion of ADL/functional transfer/mobility tasks. Pt would benefit from continued skilled OT to increase safety and independence with completion of ADL/IADL tasks for functional independence and quality of life. Rehab Potential: Good for established goals     Patient / Family Goal: none stated    Patient and/or family were instructed on functional diagnosis, prognosis/goals and OT plan of care. Demonstrated fair understanding. Eval Complexity: Low    Time In: 1336  Time Out: 1349    Min Units   OT Eval Low 97165  x  1   OT Eval Medium 70803      OT Eval High 23275      OT Re-Eval [id number]       Therapeutic Ex 87472       Therapeutic Activities 36599       ADL/Self Care 67392       Orthotic Management [id number]       Manual [id number]     Neuro Re-Ed [id number]       Non-Billable Time          Evaluation Time additionally includes thorough review of current medical information, gathering information on past medical history/social history and prior level of function, interpretation of standardized testing/informal observation of tasks, assessment of data and development of plan of care and goals.             Dulce Toscano, OTR/L, [id number]

## 2021-12-22 NOTE — H&P
History and Physical      CHIEF COMPLAINT: Dizziness, nausea vomiting diarrhea    History of Present Illness: 38-year-old male patient of Dr. Gonsalez Check am asked admit and follow. History obtained from patient wife as well as extensive review electronic record. Patient presented to the ED early morning hours of 12/21, treated and discharged on Zofran as needed. Patient with sensation of dizziness and nausea most of the day of 12/20; felt like he needed bowel movement was unable so wife administered enema. Had significant episodes of watery diarrhea but also recurring episodes of emesis. He was given fluid resuscitation and discharged approximately 0400 hrs. to home. He returned to the ED afternoon of 12/21. No further nausea or emesis however he was unable to ambulate due to severe dizziness. No head trauma no syncope. Vitals at the time 109/60 SPO2 90 on room air heart rate of 104. At this time, patient states she feels fine. No further dizziness lightheadedness no nausea. He is alert oriented x3; wife agrees this is his baseline extremely unlikely vertebrobasilar insufficiency. MRI will be canceled. Patient will be discharged.   CT of head done in the ED on second visit with following results--      FINDINGS:   There is mild patchy hypoattenuation within the periventricular white matter   of the bilateral cerebral hemispheres. Eduardo Duff is no evidence mass, mass   effect, or midline shift.  The ventricles and sulci are of normal size and   configuration for patient's age of 64 years.  No extra-axial fluid   collections or acute hemorrhage.  The gray-white differentiation appears   preserved without evidence of acute cortical ischemia.  The calvarium is   intact.  There is mild polypoid mucosal thickening within the right maxillary   sinus.  There is mild mucosal thickening within the ethmoid and sphenoid   sinuses.  The mastoid air cells are clear.  There are bilateral lens   replacements. Eduardo Duff calcifications in the bilateral posterior globes   adjacent to the optic nerves suggesting drusen.       Impression:       1. No acute intracranial abnormality. 2. Chronic small vessel ischemic disease. 3. 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 I was contacted regarding the above and patient admitted. This morning, patient had orthostatic blood pressures checked 391 systolic lying down; 488 standing. --Physical therapy [facility] score from today 18/24. --Patient now scheduled for MRI of brain to rule out vertebrobasilar insufficiency/stroke. --Procalcitonin 0.37.  proBNP 401. LDL 42 albumin 3.2 protein 5.5 calcium 8.1 with ionized calcium normal 1. 19. Phosphorus is low at 2.2. N81 folic acid normal.  Iron and iron saturation slightly low normal TIBC. D-dimer 854 INR 1.1. Molecular/PCR group viral respiratory panel all not detected including SARS-CoV-2. Legionella and strep antigens presumptive negative. --Patient seen earlier by case management; minimal dizziness; plan is home they declined any home health care. --Patient known to have severe Parkinson's  --History of right-sided renal cell carcinoma with right nephrectomy [facility] 2013. Patient follows with [facility] oncology approximately 6 months; has repeat CT of abdomen. There has been noted to be increasing amounts of adenopathy as well as size of the nodes in chest and abdomen. Radiologist interpreted the above as granulomatous disease possible metastatic disease; oncologist unsure regarding etiology of increasing number of masses.   --Following the above surgery patient developed left leg DVT eventual pulmonary embolus underwent insertion of IVC filter which removed in 2014  --Patient with known bilateral severe lumbar radiculopathy        Past Medical History:   Diagnosis Date    Acute left lumbar radiculopathy 03/21/2021    YUDY (acute kidney injury) ([facility] [address] 11/29/2013    Anxiety and depression     Cancer ([facility] [address]     Cerebral atrophy ([facility] [address]     Disc displacement, lumbar     History of pulmonary embolus (PE) 03/20/2021    Hx of deep venous thrombosis 2013    Kidney disease     Nerve injury     Parkinson disease ([facility] [address] 11/29/2013    Pneumonia     Pulmonary embolism ([facility] [address] 11/29/2013    Renal carcinoma ([facility] [address] 11/29/2013    right    Rhinovirus infection 03/20/2021    3/2021    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 11/27/2013    [facility]; REMOVAL 2/24/2014         Past Surgical History:   Procedure Laterality Date    BACK SURGERY      CATARACT REMOVAL      ENDOSCOPY, COLON, DIAGNOSTIC      EYE SURGERY      [facility] FILTER INSERTION  11/27/2013    [facility]    [facility]C FILTER REMOVAL  02/24/2014    [facility]    TOTAL NEPHRECTOMY Right 11/2013    Renal cell carcinoma       Medications Prior to Admission:    Medications Prior to Admission: aspirin 81 MG tablet, Take 81 mg by mouth daily 1200  glipiZIDE (GLUCOTROL) 5 MG tablet, Take 2.5 mg by mouth 2 times daily (before meals) 1500 & 2100  rasagiline mesylate 1 MG TABS, Take 1 tablet by mouth daily 0800  carbidopa-levodopa (PARCOPA)  MG TBDP, Take by mouth 4 times daily Takes 200 mg at 0800  100mg at 1200  200mg at 1600  100mg 2000  ondansetron (ZOFRAN ODT) 4 MG disintegrating tablet, Take 1 tablet by mouth every 8 hours as needed for Nausea or Vomiting  gabapentin (NEURONTIN) 300 MG capsule, Take 1 capsule by mouth 3 times daily for 60 days. (Patient taking differently: Take 600 mg by mouth 3 times daily. Administration times: 1000, 1500, 2100)  diazePAM (VALIUM) 2 MG tablet, Take 2 mg by mouth every 6 hours as needed for Anxiety. Allergies:    Tape [adhesive tape]    Social History:    reports that he quit smoking about 42 years ago. His smoking use included cigars. He has never used smokeless tobacco. He reports current alcohol use. He reports that he does not use drugs.     Family History:   family history includes Cancer in his sister; Diabetes in his brother; Heart Disease in his father and mother. REVIEW OF SYSTEMS:  As above in the HPI, otherwise negative    PHYSICAL EXAM:    VS: BP (!) 163/79   Pulse 90   Temp 98.1 °F (36.7 °C) (Oral)   Resp 18   Ht 6' (1.829 m)   Wt 227 lb 8.2 oz (103.2 kg)   SpO2 92%   BMI 30.86 kg/m²     General appearance: Alert, Awake, Oriented times 3, no distress  Skin: Warm and dry ; no rashes  Head: Normocephalic. No masses, lesions or tenderness noted  Eyes: Conjunctivae pink, sclera white. PERRL,EOM-I  Ears: External ears normal  Nose/Sinuses: Nares normal. Septum midline. Mucosa normal. No drainage  Oropharynx: Oropharynx clear with no exudate seen  Neck: Supple. No jugular venous distension, lymphadenopathy or thyromegaly Trachea midline  Lungs: Clear to auscultation bilaterally. No rhonchi, crackles or wheezes  Heart: S1 S2  Regular rate and rhythm. No rub, murmur or gallop  Abdomen: Soft, non-tender. BS normal. No masses, organomegaly; no rebound or guarding  Extremities: No edema, Peripheral pulses palpable  Musculoskeletal: Muscular strength appears intact. Neuro: Cranial nerves II through XII intact; moves all extremities equally.   Minimal pill-rolling tremor of both hands; equivocal bradykinesia  Breast: deferred  Rectal: deferred  Genitalia:  deferred    LABS:  CBC:   Lab Results   Component Value Date    WBC 5.9 12/22/2021    RBC 4.40 12/22/2021    HGB 14.2 12/22/2021    HCT 43.0 12/22/2021    MCV 97.7 12/22/2021    MCH 32.3 12/22/2021    MCHC 33.0 12/22/2021    RDW 12.9 12/22/2021     12/22/2021    MPV 9.9 12/22/2021     CBC with Differential:    Lab Results   Component Value Date    WBC 5.9 12/22/2021    RBC 4.40 12/22/2021    HGB 14.2 12/22/2021    HCT 43.0 12/22/2021     12/22/2021    MCV 97.7 12/22/2021    MCH 32.3 12/22/2021    MCHC 33.0 12/22/2021    RDW 12.9 12/22/2021    LYMPHOPCT 9.2 12/22/2021    MONOPCT 7.2 12/22/2021    BASOPCT 0.2 12/22/2021    MONOSABS 0.42 12/22/2021    LYMPHSABS 0.54 12/22/2021    EOSABS 0.02 12/22/2021    BASOSABS 0.01 12/22/2021     Hemoglobin/Hematocrit:    Lab Results   Component Value Date    HGB 14.2 12/22/2021    HCT 43.0 12/22/2021     CMP:    Lab Results   Component Value Date     12/22/2021    K 4.3 12/22/2021    K 4.6 11/16/2021     12/22/2021    CO2 23 12/22/2021    BUN 16 12/22/2021    CREATININE 1.1 12/22/2021    GFRAA >60 12/22/2021    LABGLOM >60 12/22/2021    GLUCOSE 138 12/22/2021    PROT 5.5 12/22/2021    LABALBU 3.2 12/22/2021    CALCIUM 8.1 12/22/2021    BILITOT 0.9 12/22/2021    ALKPHOS 59 12/22/2021    AST 16 12/22/2021    ALT <5 12/22/2021     BMP:    Lab Results   Component Value Date     12/22/2021    K 4.3 12/22/2021    K 4.6 11/16/2021     12/22/2021    CO2 23 12/22/2021    BUN 16 12/22/2021    LABALBU 3.2 12/22/2021    CREATININE 1.1 12/22/2021    CALCIUM 8.1 12/22/2021    GFRAA >60 12/22/2021    LABGLOM >60 12/22/2021    GLUCOSE 138 12/22/2021     Hepatic Function Panel:    Lab Results   Component Value Date    ALKPHOS 59 12/22/2021    ALT <5 12/22/2021    AST 16 12/22/2021    PROT 5.5 12/22/2021    BILITOT 0.9 12/22/2021    BILIDIR <0.2 12/22/2021    IBILI see below 12/22/2021    LABALBU 3.2 12/22/2021     Ionized Calcium:  No results found for: IONCA  Magnesium:    Lab Results   Component Value Date    MG 1.7 12/22/2021     Phosphorus:    Lab Results   Component Value Date    PHOS 2.2 12/22/2021     LDH:  No results found for: LDH  Uric Acid:    Lab Results   Component Value Date    LABURIC 5.8 12/22/2021     PT/INR:    Lab Results   Component Value Date    PROTIME 11.9 12/22/2021    INR 1.1 12/22/2021     Warfarin PT/INR:  No components found for: PTPATWAR, PTINRWAR  PTT:    Lab Results   Component Value Date    APTT <20.0 12/21/2021   [APTT}  Troponin:    Lab Results   Component Value Date    TROPONINI <0.01 03/19/2021     Last 3 Troponin:    Lab Results   Component Value Date    TROPONINI <0.01 03/19/2021    TROPONINI <0.01 08/17/2020 TROPONINI <0.01 04/23/2019     U/A:    Lab Results   Component Value Date    COLORU Yellow 12/21/2021    PROTEINU Negative 12/21/2021    PHUR 6.0 12/21/2021    WBCUA NONE 11/16/2021    RBCUA NONE 11/16/2021    BACTERIA NONE SEEN 11/16/2021    CLARITYU Clear 12/21/2021    SPECGRAV 1.015 12/21/2021    LEUKOCYTESUR Negative 12/21/2021    UROBILINOGEN 0.2 12/21/2021    BILIRUBINUR Negative 12/21/2021    BLOODU Negative 12/21/2021    GLUCOSEU Negative 12/21/2021     HgBA1c:    Lab Results   Component Value Date    LABA1C 6.7 12/22/2021     FLP:    Lab Results   Component Value Date    TRIG 59 12/22/2021    HDL 50 12/22/2021    LDLCALC 42 12/22/2021    LABVLDL 12 12/22/2021     TSH:    Lab Results   Component Value Date    TSH 0.817 12/22/2021     VITAMIN B12: No components found for: B12  FOLATE:    Lab Results   Component Value Date    FOLATE 9.4 12/22/2021     IRON:    Lab Results   Component Value Date    IRON 49 12/22/2021     Iron Saturation:  No components found for: PERCENTFE  TIBC:    Lab Results   Component Value Date    TIBC 257 12/22/2021     FERRITIN:    Lab Results   Component Value Date    FERRITIN 372 12/22/2021       RADIOLOGY:  CT Head WO Contrast   Final Result   1. No acute intracranial abnormality. 2. Chronic small vessel ischemic disease. 3. 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       RECOMMENDATIONS:   Unavailable         MRI BRAIN W WO CONTRAST    (Results Pending)       ASSESSMENT:      Active Hospital Problems    Diagnosis     Unable to ambulate [R26.2]     Vertigo [R42]        PLAN:  Medications discussed with patient  GI prophylaxis  DVT prophylaxis  Potassium phosphate 30 mmol IV today  Restart gabapentin 600 mg 3 times daily  Continue Sinemet as per prehospital  Glucotrol 2.5 mg twice daily  Low-dose sliding scale insulin  Meclizine 12.5 mg 3 times daily as needed for dizziness  Med reconciliation completed  Prescription written  Follow-up Dr. Papi Brandon 1 week  Return if more difficulties with dizziness vertigo emesis      Please note that over 50 minutes was spent in evaluating the patient, review of records and results, discussion with staff/family, etc.    David Servin DO    12:53 PM  12/22/2021    Voice recognition software use for dictation

## 2021-12-22 NOTE — PROGRESS NOTES
Physical Therapy    Facility/Department: [address] MED SURG/TELE  Initial Assessment    NAME: Darius Berry  : 1945  MRN: [id number]    Date of Service: 2021      Attending Provider:  Violetta Zhu DO    Evaluating PT:  Saba Hair P.T. Room #:  0422/7644-L  Diagnosis:  Unable to ambulate [R26.2]  Vertigo [R42]  Pertinent PMHx/PSHx:  Parkinson's Disease  Precautions:  Falls    SUBJECTIVE:    Pt lives with his wife in a 1 story home with 1+3 stairs and 1 rail to enter. Pt ambulated with no AD PTA. OBJECTIVE:   Initial Evaluation  Date: 21 Treatment Short Term/ Long Term   Goals   Was pt agreeable to Eval/treatment? yes     Does pt have pain? No c/o pain     Bed Mobility  Rolling: supervision  Supine to sit: supervision  Sit to supine: supervision  Scooting: supervision  Independent    Transfers Sit to stand: SBA  Stand to sit: SBA  Stand pivot: CGA  supervision   Ambulation   250 feet with no AD CGA  300 feet with no AD SBA   Stair negotiation: ascended and descended NA  4 steps with 1 rail SBA   AM-PAC 6 Clicks 75/25       BLE ROM is WFL. BLE strength is grossly 4/5. Sensation:  Pt denies numbness and tingling to extremities  Balance: sitting is supervision and standing with no AD is SBA/CGA  Endurance: fair+    Patient education  Pt educated on bigger steps with amb    Patient response to education:   Pt verbalized understanding Pt demonstrated skill Pt requires further education in this area   yes inconsistent yes     ASSESSMENT:    Conditions Requiring Skilled Therapeutic Intervention:    [x]Decreased strength     []Decreased ROM  [x]Decreased functional mobility  [x]Decreased balance   [x]Decreased endurance   [x]Decreased posture  []Decreased sensation  []Decreased coordination   []Decreased vision  []Decreased safety awareness   []Increased pain     Comments:  Pt has history of parkinson's disease.   He reports feeling better today and wife reports he was able to walk today to BR with staff which he was unable to walk PTA. Pt walked with no AD with slow gait speed, short step lengths, and B knees flexed during stance phase and gait was mildly unsteady at time and he was provided CGA for safety. Pt and wife report he is walking today close to his baseline. Pt was left supine in bed with call light left by patient and wife was present. Pt's/ family goals   1. To go home. Patient and or family understand(s) diagnosis, prognosis, and plan of care. PHYSICAL THERAPY PLAN OF CARE:    PT POC is established based on physician order and patient diagnosis     Referring provider/PT Order:  PT eval and treat  Diagnosis:  Unable to ambulate [R26.2]  Vertigo [R42]  Specific instructions for next treatment:  Attempt stairs. Current Treatment Recommendations:     [x] Strengthening to improve independence with functional mobility   [] ROM to improve ROM and decrease spasm and pain which will help promote independence with functional mobility   [x] Balance Training to improve static/dynamic balance and to reduce fall risk  [x] Endurance Training to improve activity tolerance during functional mobility   [x] Transfer Training to improve safety and independence with all functional transfers   [x] Gait Training to improve gait mechanics, endurance and assess need for appropriate assistive device  [x] Stair Training in preparation for safe discharge home and/or into the community   [] Positioning to prevent skin breakdown and contractures  [] Safety and Education Training   [x] Patient/Caregiver Education   [] HEP  [] Other     PT long term treatment goals are located in above grid    Frequency of treatments: 2-5x/week x 1-2 weeks.     Time in  10:55  Time out  11:15    Evaluation Time includes thorough review of current medical information, gathering information on past medical history/social history and prior level of function, completion of standardized testing/informal observation of tasks, assessment of data and education on plan of care and goals. CPT codes:  [x] Low Complexity PT evaluation 64065  [] Moderate Complexity PT evaluation 31030  [] High Complexity PT evaluation 44107  [] PT Re-evaluation 63125  [] Gait training 23945 ** minutes  [] Manual therapy 12378 ** minutes  [] Therapeutic activities 35145 ** minutes  [] Therapeutic exercises 87154 ** minutes  [] Neuromuscular reeducation 32894 ** minutes     Dave Ring., P.T.   License Number: [id number]

## 2021-12-22 NOTE — PROGRESS NOTES
Dr Reggie Philip paged to request ativan prior to MRI and restart home gabapentin. Also, notified of positive orthostatic vital signs.

## 2021-12-22 NOTE — CARE COORDINATION
Met with pt/wife at bedside; pt eating lunch. States independent PTA; uses cane occasionally; PCP ; states he has been up in room , minimal dizziness. Reports no recent diarrhea. Plan at discharge is home, decline/ deny any needs. [facility] 18/24. Wife can transport; will follow. Sukhi Lorenzo.

## 2021-12-23 NOTE — DISCHARGE SUMMARY
DISCHARGE SUMMARY  Patient ID:  Chauncey Regalado  [id number]  62 y.o.  1945    Admit date: 12/21/2021      Discharge date : 12/22/2021      Admission Diagnoses: Unable to ambulate [R26.2]  Vertigo [R42]    Discharge Diagnosis  Principal Problem:    Unable to ambulate  Active Problems:    Renal carcinoma (HCC)    Anxiety and depression    Parkinson disease ([address]    CKD (chronic kidney disease) stage 3, GFR 30-59 ml/min    History of pulmonary embolus (PE)    Cerebral atrophy (HCC)    Vertigo  Resolved Problems:    * No resolved hospital problems. *      Hospital Course:       CHIEF COMPLAINT: Dizziness, nausea vomiting diarrhea     History of Present Illness: 47-year-old male patient of Dr. De Souza Resides am asked admit and follow. History obtained from patient wife as well as extensive review electronic record. Patient presented to the ED early morning hours of 12/21, treated and discharged on Zofran as needed. Patient with sensation of dizziness and nausea most of the day of 12/20; felt like he needed bowel movement was unable so wife administered enema. Had significant episodes of watery diarrhea but also recurring episodes of emesis. He was given fluid resuscitation and discharged approximately 0400 hrs. to home. He returned to the ED afternoon of 12/21. No further nausea or emesis however he was unable to ambulate due to severe dizziness. No head trauma no syncope. Vitals at the time 109/60 SPO2 90 on room air heart rate of 104. At this time, patient states she feels fine. No further dizziness lightheadedness no nausea. He is alert oriented x3; wife agrees this is his baseline extremely unlikely vertebrobasilar insufficiency. MRI will be canceled. Patient will be discharged.   CT of head done in the ED on second visit with following results--            FINDINGS:   There is mild patchy hypoattenuation within the periventricular white matter   of the bilateral cerebral hemispheres. Tamia Mora is no abdomen. Radiologist interpreted the above as granulomatous disease possible metastatic disease; oncologist unsure regarding etiology of increasing number of masses.   --Following the above surgery patient developed left leg DVT eventual pulmonary embolus underwent insertion of IVC filter which removed in 2014  --Patient with known bilateral severe lumbar radiculopathy        Instructions at discharge:  PLAN:  Medications discussed with patient  GI prophylaxis  DVT prophylaxis  Potassium phosphate 30 mmol IV today  Restart gabapentin 600 mg 3 times daily  Continue Sinemet as per prehospital  Glucotrol 2.5 mg twice daily  Low-dose sliding scale insulin  Meclizine 12.5 mg 3 times daily as needed for dizziness  Med reconciliation completed  Prescription written  Follow-up Dr. Genevieve Daly 1 week  Return if more difficulties with dizziness vertigo emesis         Condition at DISCHARGE : [id number]     Discharged to: Home    Discharge Instructions: Medications reviewed with patient    Consults:none     Past Medical Hx :   Past Medical History:   Diagnosis Date    Acute left lumbar radiculopathy 03/21/2021    YUDY (acute kidney injury) ([address] 11/29/2013    Anxiety and depression     Cancer ([address]     Cerebral atrophy ([address]     Disc displacement, lumbar     History of pulmonary embolus (PE) 03/20/2021    Hx of deep venous thrombosis 2013    Kidney disease     Nerve injury     Parkinson disease ([address] 11/29/2013    Pneumonia     Pulmonary embolism ([address] 11/29/2013    Renal carcinoma ([address] 11/29/2013    right    Rhinovirus infection 03/20/2021    3/2021    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 11/27/2013    [facility]; REMOVAL 2/24/2014       Past Surgical Hx :  Past Surgical History:   Procedure Laterality Date    BACK SURGERY      CATARACT REMOVAL      ENDOSCOPY, COLON, DIAGNOSTIC      EYE SURGERY      [facility] FILTER INSERTION  11/27/2013    [facility]    IVC FILTER REMOVAL  02/24/2014    [facility]    TOTAL NEPHRECTOMY Right 11/2013    Renal cell carcinoma       Labs:   CBC:   Lab Results   Component Value Date    WBC 5.9 12/22/2021    RBC 4.40 12/22/2021    HGB 14.2 12/22/2021    HCT 43.0 12/22/2021    MCV 97.7 12/22/2021    MCH 32.3 12/22/2021    MCHC 33.0 12/22/2021    RDW 12.9 12/22/2021     12/22/2021    MPV 9.9 12/22/2021     CBC with Differential:    Lab Results   Component Value Date    WBC 5.9 12/22/2021    RBC 4.40 12/22/2021    HGB 14.2 12/22/2021    HCT 43.0 12/22/2021     12/22/2021    MCV 97.7 12/22/2021    MCH 32.3 12/22/2021    MCHC 33.0 12/22/2021    RDW 12.9 12/22/2021    LYMPHOPCT 9.2 12/22/2021    MONOPCT 7.2 12/22/2021    BASOPCT 0.2 12/22/2021    MONOSABS 0.42 12/22/2021    LYMPHSABS 0.54 12/22/2021    EOSABS 0.02 12/22/2021    BASOSABS 0.01 12/22/2021     Hemoglobin/Hematocrit:    Lab Results   Component Value Date    HGB 14.2 12/22/2021    HCT 43.0 12/22/2021     CMP:    Lab Results   Component Value Date     12/22/2021    K 4.3 12/22/2021    K 4.6 11/16/2021     12/22/2021    CO2 23 12/22/2021    BUN 16 12/22/2021    CREATININE 1.1 12/22/2021    GFRAA >60 12/22/2021    LABGLOM >60 12/22/2021    GLUCOSE 138 12/22/2021    PROT 5.5 12/22/2021    LABALBU 3.2 12/22/2021    CALCIUM 8.1 12/22/2021    BILITOT 0.9 12/22/2021    ALKPHOS 59 12/22/2021    AST 16 12/22/2021    ALT <5 12/22/2021     BMP:    Lab Results   Component Value Date     12/22/2021    K 4.3 12/22/2021    K 4.6 11/16/2021     12/22/2021    CO2 23 12/22/2021    BUN 16 12/22/2021    LABALBU 3.2 12/22/2021    CREATININE 1.1 12/22/2021    CALCIUM 8.1 12/22/2021    GFRAA >60 12/22/2021    LABGLOM >60 12/22/2021    GLUCOSE 138 12/22/2021     Hepatic Function Panel:    Lab Results   Component Value Date    ALKPHOS 59 12/22/2021    ALT <5 12/22/2021    AST 16 12/22/2021    PROT 5.5 12/22/2021    BILITOT 0.9 12/22/2021    BILIDIR <0.2 12/22/2021    IBILI see below 12/22/2021    LABALBU 3.2 12/22/2021 Ionized Calcium:  No results found for: IONCA  Magnesium:    Lab Results   Component Value Date    MG 1.7 12/22/2021     Phosphorus:    Lab Results   Component Value Date    PHOS 2.2 12/22/2021     LDH:  No results found for: LDH  Uric Acid:    Lab Results   Component Value Date    LABURIC 5.8 12/22/2021     PT/INR:    Lab Results   Component Value Date    PROTIME 11.9 12/22/2021    INR 1.1 12/22/2021     Warfarin PT/INR:  No components found for: Freddy Velasquez  PTT:    Lab Results   Component Value Date    APTT <20.0 12/21/2021   [APTT}  Troponin:    Lab Results   Component Value Date    TROPONINI <0.01 03/19/2021     Last 3 Troponin:    Lab Results   Component Value Date    TROPONINI <0.01 03/19/2021    TROPONINI <0.01 08/17/2020    TROPONINI <0.01 04/23/2019     U/A:    Lab Results   Component Value Date    COLORU Yellow 12/21/2021    PROTEINU Negative 12/21/2021    PHUR 6.0 12/21/2021    WBCUA NONE 11/16/2021    RBCUA NONE 11/16/2021    BACTERIA NONE SEEN 11/16/2021    CLARITYU Clear 12/21/2021    SPECGRAV 1.015 12/21/2021    LEUKOCYTESUR Negative 12/21/2021    UROBILINOGEN 0.2 12/21/2021    BILIRUBINUR Negative 12/21/2021    BLOODU Negative 12/21/2021    GLUCOSEU Negative 12/21/2021     HgBA1c:    Lab Results   Component Value Date    LABA1C 6.7 12/22/2021     FLP:    Lab Results   Component Value Date    TRIG 59 12/22/2021    HDL 50 12/22/2021    LDLCALC 42 12/22/2021    LABVLDL 12 12/22/2021     TSH:    Lab Results   Component Value Date    TSH 0.817 12/22/2021     VITAMIN B12: No components found for: B12  FOLATE:    Lab Results   Component Value Date    FOLATE 9.4 12/22/2021     IRON:    Lab Results   Component Value Date    IRON 49 12/22/2021     Iron Saturation:  No components found for: PERCENTFE  TIBC:    Lab Results   Component Value Date    TIBC 257 12/22/2021     FERRITIN:    Lab Results   Component Value Date    FERRITIN 372 12/22/2021          CBC:  Recent Labs     12/21/21  0208 12/22/21  0930   WBC 10.6 5.9   RBC 4.83 4.40   HGB 15.4 14.2   HCT 46.5 43.0    100*   MCV 96.3 97.7   MCH 31.9 32.3   MCHC 33.1 33.0   RDW 12.6 12.9   LYMPHOPCT 3.3* 9.2*   MONOPCT 4.5 7.2   BASOPCT 0.2 0.2   MONOSABS 0.48 0.42   LYMPHSABS 0.35* 0.54*   EOSABS 0.06 0.02*   BASOSABS 0.02 0.01          H & H :  Recent Labs     12/21/21  0208 12/22/21  0930   HGB 15.4 14.2       TSH:  Recent Labs     12/22/21 0413   TSH 0.817       GLUCOSE:No results for input(s): POCGLU in the last 72 hours. CMP:  Recent Labs     12/21/21  0208 12/22/21 0413    135   K 4.6 4.3    105   CO2 21* 23   BUN 18 16   CREATININE 1.2 1.1   GFRAA >60 >60   LABGLOM 58 >60   GLUCOSE 168* 138*   PROT 6.5 5.5*   LABALBU 3.8 3.2*   CALCIUM 8.8 8.1*   BILITOT 0.6 0.9   ALKPHOS 77 59   AST 14 16   ALT <5 <5         BNP:No results found for: BNP    PROTIME/INR:  Recent Labs     12/21/21  0208 12/22/21 0413   PROTIME 10.6 11.9   INR 1.0 1.1       CRP: No results for input(s): CRP in the last 72 hours. ESR:  Recent Labs     12/22/21  0930   SEDRATE 2       LIPASE , AMYLASE:  Lab Results   Component Value Date    LIPASE 46 12/21/2021      No results found for: AMYLASE    ABGs: No results found for: PHART, PO2ART, UXS8SHU    CARDIAC: No results for input(s): CKTOTAL, CKMB, CKMBINDEX, TROPONINI in the last 72 hours. Lipid Profile:   Lab Results   Component Value Date    TRIG 59 12/22/2021    HDL 50 12/22/2021    LDLCALC 42 12/22/2021    CHOL 104 12/22/2021        XR PELVIS (1-2 VIEWS)    Result Date: 12/9/2021  EXAMINATION: FOUR XRAY VIEWS OF THE LEFT KNEE; ONE XRAY VIEW OF THE PELVIS 12/9/2021 7:31 am; 12/9/2021 7:23 am COMPARISON: None. HISTORY: ORDERING SYSTEM PROVIDED HISTORY: Left knee pain, unspecified chronicity FINDINGS: Left knee: Tricompartmental osteoarthritis with moderate to severe findings at the medial weight-bearing compartment. No fracture or dislocation. Normal soft tissues.  Pelvis: Mild osteoarthritis at both hips. There is mild degenerative arthropathy at the symphysis pubis. No acute fracture or dislocation. There is altered trabecular architecture in the left femoral shaft likely related to a healed fracture. Tricompartmental osteoarthritis with moderate to severe findings at the medial weight-bearing compartment. Mild osteoarthritis at the hips. Additional observations as outlined above. XR KNEE LEFT (MIN 4 VIEWS)    Result Date: 12/9/2021  EXAMINATION: FOUR XRAY VIEWS OF THE LEFT KNEE; ONE XRAY VIEW OF THE PELVIS 12/9/2021 7:31 am; 12/9/2021 7:23 am COMPARISON: None. HISTORY: ORDERING SYSTEM PROVIDED HISTORY: Left knee pain, unspecified chronicity FINDINGS: Left knee: Tricompartmental osteoarthritis with moderate to severe findings at the medial weight-bearing compartment. No fracture or dislocation. Normal soft tissues. Pelvis: Mild osteoarthritis at both hips. There is mild degenerative arthropathy at the symphysis pubis. No acute fracture or dislocation. There is altered trabecular architecture in the left femoral shaft likely related to a healed fracture. Tricompartmental osteoarthritis with moderate to severe findings at the medial weight-bearing compartment. Mild osteoarthritis at the hips. Additional observations as outlined above. CT Head WO Contrast    Result Date: 12/21/2021  EXAMINATION: CT OF THE HEAD WITHOUT CONTRAST  12/21/2021 6:03 pm TECHNIQUE: CT of the head was performed without the administration of intravenous contrast. Dose modulation, iterative reconstruction, and/or weight based adjustment of the mA/kV was utilized to reduce the radiation dose to as low as reasonably achievable. COMPARISON: CT head 11/16/2021 . HISTORY: ORDERING SYSTEM PROVIDED HISTORY: Gait inability, hx of parkinsons TECHNOLOGIST PROVIDED HISTORY: Reason for exam:->Gait inability, hx of parkinsons Has a \"code stroke\" or \"stroke alert\" been called? ->No Decision Support Exception - unselect if not a suspected or confirmed emergency medical condition->Emergency Medical Condition (MA) FINDINGS: There is mild patchy hypoattenuation within the periventricular white matter of the bilateral cerebral hemispheres. There is no evidence mass, mass effect, or midline shift. The ventricles and sulci are of normal size and configuration for patient's age of 64 years. No extra-axial fluid collections or acute hemorrhage. The gray-white differentiation appears preserved without evidence of acute cortical ischemia. The calvarium is intact. There is mild polypoid mucosal thickening within the right maxillary sinus. There is mild mucosal thickening within the ethmoid and sphenoid sinuses. The mastoid air cells are clear. There are bilateral lens replacements. There calcifications in the bilateral posterior globes adjacent to the optic nerves suggesting drusen. 1. No acute intracranial abnormality. 2. Chronic small vessel ischemic disease. 3. 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 RECOMMENDATIONS: Unavailable     XR CHEST PORTABLE    Result Date: 12/21/2021  EXAMINATION: ONE XRAY VIEW OF THE CHEST 12/21/2021 2:29 am COMPARISON: 11/16/2021 HISTORY: ORDERING SYSTEM PROVIDED HISTORY: nvd, but hypoxia no hx of pulm dxz TECHNOLOGIST PROVIDED HISTORY: Reason for exam:->nvd, but hypoxia no hx of pulm dxz FINDINGS: Heart size is normal.  Lower cervical fusion hardware. No pneumothorax. Coarsened interstitial opacities are likely chronic. No distinct consolidation or effusion identified. Degenerative changes are present in the spine and shoulders. Similar chronic appearing findings.   PA and lateral views would be useful for further assessment, if symptoms persist.       Discharge Exam:  See progress note from today    Discharge Medication List as of 12/22/2021  3:18 PM      START taking these medications    Details   meclizine (ANTIVERT) 12.5 MG tablet Take 1 tablet by mouth 3 times daily as needed for Dizziness, Disp-20 tablet, R-1Normal         CONTINUE these medications which have NOT CHANGED    Details   aspirin 81 MG tablet Take 81 mg by mouth daily 1200Historical Med      glipiZIDE (GLUCOTROL) 5 MG tablet Take 2.5 mg by mouth 2 times daily (before meals) 1500 & 2100Historical Med      rasagiline mesylate 1 MG TABS Take 1 tablet by mouth daily 0800Historical Med      carbidopa-levodopa (PARCOPA)  MG TBDP Take by mouth 4 times daily Takes 200 mg at 0800  100mg at 1200  200mg at 1600  100mg 2000Historical Med      ondansetron (ZOFRAN ODT) 4 MG disintegrating tablet Take 1 tablet by mouth every 8 hours as needed for Nausea or Vomiting, Disp-15 tablet, R-0Normal      gabapentin (NEURONTIN) 300 MG capsule Take 1 capsule by mouth 3 times daily for 60 days. , Disp-90 capsule, R-3Normal      diazePAM (VALIUM) 2 MG tablet Take 2 mg by mouth every 6 hours as needed for Anxiety. Historical Med             Time Spent on discharge is more than 30 minutes --      TIME  INCLUDES TIME THAT WAS  SPENT WITH DISCHARGE PAPERS, MEDICATION REVIEW, MEDICATION RECONCILIATION,   PRESCRIPTIONS, CHART REVIEW, PATIENT EXAM , FINAL PROGRESS NOTE, DISCUSSION OF FINDINGS WITH PATIENT AND AVAILABLE FAMILY , AND DICTATION  WHERE NEEDED ; Home Health Care [facility]) FORMS COMPLETED ; N-17  COMPLETION ;  H&P UPDATED ; DURABLE EQUIPMENT FORMS.      Active Hospital Problems    Diagnosis     Unable to ambulate [R26.2]     Vertigo [R42]     Cerebral atrophy ([address] [G31.9]     History of pulmonary embolus (PE) [Z86.711]     CKD (chronic kidney disease) stage 3, GFR 30-59 ml/min [N18.30]     Parkinson disease ([address] [G20]     Renal carcinoma ([address] [C64.9]     Anxiety and depression [F41.9, F32.A]        Signed:    Pedro Ortiz DO      12/23/2021    2:40 PM    Voice recognition software use for dictation

## 2022-08-08 ENCOUNTER — HOSPITAL ENCOUNTER (OUTPATIENT)
Age: 77
Discharge: HOME | End: 2022-08-10

## 2022-08-08 LAB
ABO/RH: NORMAL
ANTIBODY SCREEN: NORMAL

## 2022-08-08 PROCEDURE — 86900 BLOOD TYPING SEROLOGIC ABO: CPT

## 2022-08-08 PROCEDURE — 86850 RBC ANTIBODY SCREEN: CPT

## 2022-08-08 PROCEDURE — 87081 CULTURE SCREEN ONLY: CPT

## 2022-08-08 PROCEDURE — 86901 BLOOD TYPING SEROLOGIC RH(D): CPT

## 2022-08-10 LAB — MRSA CULTURE ONLY: NORMAL

## 2022-08-17 ENCOUNTER — HOSPITAL ENCOUNTER (OUTPATIENT)
Age: 77
Discharge: HOME | End: 2022-08-19

## 2022-08-17 LAB
ANION GAP SERPL CALCULATED.3IONS-SCNC: 9 MMOL/L (ref 7–16)
BUN BLDV-MCNC: 17 MG/DL (ref 6–23)
CALCIUM SERPL-MCNC: 8.8 MG/DL (ref 8.6–10.2)
CHLORIDE BLD-SCNC: 106 MMOL/L (ref 98–107)
CO2: 24 MMOL/L (ref 22–29)
CREAT SERPL-MCNC: 1 MG/DL (ref 0.7–1.2)
GFR AFRICAN AMERICAN: >60
GFR NON-AFRICAN AMERICAN: >60 ML/MIN/1.73
GLUCOSE BLD-MCNC: 118 MG/DL (ref 74–99)
HCT VFR BLD CALC: 42.7 % (ref 37–54)
HEMOGLOBIN: 14 G/DL (ref 12.5–16.5)
MCH RBC QN AUTO: 32.9 PG (ref 26–35)
MCHC RBC AUTO-ENTMCNC: 32.8 % (ref 32–34.5)
MCV RBC AUTO: 100.5 FL (ref 80–99.9)
PDW BLD-RTO: 13.6 FL (ref 11.5–15)
PLATELET # BLD: 121 E9/L (ref 130–450)
PMV BLD AUTO: 10.5 FL (ref 7–12)
POTASSIUM SERPL-SCNC: 4.4 MMOL/L (ref 3.5–5)
RBC # BLD: 4.25 E12/L (ref 3.8–5.8)
SODIUM BLD-SCNC: 139 MMOL/L (ref 132–146)
WBC # BLD: 8.9 E9/L (ref 4.5–11.5)

## 2022-08-17 PROCEDURE — 85027 COMPLETE CBC AUTOMATED: CPT

## 2022-08-17 PROCEDURE — 80048 BASIC METABOLIC PNL TOTAL CA: CPT

## 2022-11-09 ENCOUNTER — HOSPITAL ENCOUNTER (OUTPATIENT)
Age: 77
Discharge: HOME | End: 2022-11-11
Payer: MEDICARE

## 2022-11-09 ENCOUNTER — HOSPITAL ENCOUNTER (OUTPATIENT)
Dept: ULTRASOUND IMAGING | Age: 77
Discharge: HOME | End: 2022-11-11
Payer: MEDICARE

## 2022-11-09 DIAGNOSIS — M79.604 RIGHT LEG PAIN: ICD-10-CM

## 2022-11-09 PROCEDURE — 93971 EXTREMITY STUDY: CPT

## 2022-11-16 ENCOUNTER — APPOINTMENT (OUTPATIENT)
Dept: CT IMAGING | Age: 77
End: 2022-11-16
Payer: MEDICARE

## 2022-11-16 ENCOUNTER — HOSPITAL ENCOUNTER (EMERGENCY)
Age: 77
Discharge: HOME | End: 2022-11-16
Attending: EMERGENCY MEDICINE
Payer: MEDICARE

## 2022-11-16 ENCOUNTER — APPOINTMENT (OUTPATIENT)
Dept: GENERAL RADIOLOGY | Age: 77
End: 2022-11-16
Payer: MEDICARE

## 2022-11-16 VITALS
SYSTOLIC BLOOD PRESSURE: 179 MMHG | RESPIRATION RATE: 18 BRPM | WEIGHT: 224 LBS | OXYGEN SATURATION: 95 % | TEMPERATURE: 98.6 F | HEIGHT: 72 IN | BODY MASS INDEX: 30.34 KG/M2 | DIASTOLIC BLOOD PRESSURE: 80 MMHG | HEART RATE: 90 BPM

## 2022-11-16 DIAGNOSIS — C77.8 MALIGNANT NEOPLASM METASTATIC TO LYMPH NODES OF MULTIPLE SITES (HCC): ICD-10-CM

## 2022-11-16 DIAGNOSIS — R59.1 LYMPHADENOPATHY: ICD-10-CM

## 2022-11-16 DIAGNOSIS — R06.00 DYSPNEA, UNSPECIFIED TYPE: Primary | ICD-10-CM

## 2022-11-16 DIAGNOSIS — R91.1 LUNG NODULE: ICD-10-CM

## 2022-11-16 LAB
ALBUMIN SERPL-MCNC: 3.9 G/DL (ref 3.5–5.2)
ALP BLD-CCNC: 94 U/L (ref 40–129)
ALT SERPL-CCNC: <5 U/L (ref 0–40)
ANION GAP SERPL CALCULATED.3IONS-SCNC: 9 MMOL/L (ref 7–16)
AST SERPL-CCNC: 16 U/L (ref 0–39)
BASOPHILS ABSOLUTE: 0.04 E9/L (ref 0–0.2)
BASOPHILS RELATIVE PERCENT: 0.7 % (ref 0–2)
BILIRUB SERPL-MCNC: 0.6 MG/DL (ref 0–1.2)
BUN BLDV-MCNC: 21 MG/DL (ref 6–23)
CALCIUM SERPL-MCNC: 9.5 MG/DL (ref 8.6–10.2)
CHLORIDE BLD-SCNC: 106 MMOL/L (ref 98–107)
CO2: 23 MMOL/L (ref 22–29)
CREAT SERPL-MCNC: 1.3 MG/DL (ref 0.7–1.2)
EOSINOPHILS ABSOLUTE: 0.15 E9/L (ref 0.05–0.5)
EOSINOPHILS RELATIVE PERCENT: 2.6 % (ref 0–6)
GFR SERPL CREATININE-BSD FRML MDRD: 56 ML/MIN/1.73
GLUCOSE BLD-MCNC: 95 MG/DL (ref 74–99)
HCT VFR BLD CALC: 46.4 % (ref 37–54)
HEMOGLOBIN: 15.2 G/DL (ref 12.5–16.5)
IMMATURE GRANULOCYTES #: 0.02 E9/L
IMMATURE GRANULOCYTES %: 0.3 % (ref 0–5)
INFLUENZA A BY PCR: NOT DETECTED
INFLUENZA B BY PCR: NOT DETECTED
INR BLD: 1.3
LYMPHOCYTES ABSOLUTE: 1.49 E9/L (ref 1.5–4)
LYMPHOCYTES RELATIVE PERCENT: 26 % (ref 20–42)
MCH RBC QN AUTO: 32 PG (ref 26–35)
MCHC RBC AUTO-ENTMCNC: 32.8 % (ref 32–34.5)
MCV RBC AUTO: 97.7 FL (ref 80–99.9)
MONOCYTES ABSOLUTE: 0.62 E9/L (ref 0.1–0.95)
MONOCYTES RELATIVE PERCENT: 10.8 % (ref 2–12)
NEUTROPHILS ABSOLUTE: 3.41 E9/L (ref 1.8–7.3)
NEUTROPHILS RELATIVE PERCENT: 59.6 % (ref 43–80)
PDW BLD-RTO: 12 FL (ref 11.5–15)
PLATELET # BLD: 146 E9/L (ref 130–450)
PMV BLD AUTO: 9.9 FL (ref 7–12)
POTASSIUM SERPL-SCNC: 4.2 MMOL/L (ref 3.5–5)
PRO-BNP: 145 PG/ML (ref 0–450)
PROTHROMBIN TIME: 14.6 SEC (ref 9.3–12.4)
RBC # BLD: 4.75 E12/L (ref 3.8–5.8)
RSV BY PCR: NEGATIVE
SARS-COV-2, NAAT: NOT DETECTED
SODIUM BLD-SCNC: 138 MMOL/L (ref 132–146)
TOTAL PROTEIN: 6.4 G/DL (ref 6.4–8.3)
TROPONIN, HIGH SENSITIVITY: 18 NG/L (ref 0–11)
TROPONIN, HIGH SENSITIVITY: 18 NG/L (ref 0–11)
WBC # BLD: 5.7 E9/L (ref 4.5–11.5)

## 2022-11-16 PROCEDURE — 85610 PROTHROMBIN TIME: CPT

## 2022-11-16 PROCEDURE — 83880 ASSAY OF NATRIURETIC PEPTIDE: CPT

## 2022-11-16 PROCEDURE — 99285 EMERGENCY DEPT VISIT HI MDM: CPT

## 2022-11-16 PROCEDURE — 85025 COMPLETE CBC W/AUTO DIFF WBC: CPT

## 2022-11-16 PROCEDURE — 87502 INFLUENZA DNA AMP PROBE: CPT

## 2022-11-16 PROCEDURE — 87635 SARS-COV-2 COVID-19 AMP PRB: CPT

## 2022-11-16 PROCEDURE — 2580000003 HC RX 258

## 2022-11-16 PROCEDURE — 87807 RSV ASSAY W/OPTIC: CPT

## 2022-11-16 PROCEDURE — 71045 X-RAY EXAM CHEST 1 VIEW: CPT

## 2022-11-16 PROCEDURE — 80053 COMPREHEN METABOLIC PANEL: CPT

## 2022-11-16 PROCEDURE — 6360000004 HC RX CONTRAST MEDICATION: Performed by: RADIOLOGY

## 2022-11-16 PROCEDURE — 84484 ASSAY OF TROPONIN QUANT: CPT

## 2022-11-16 PROCEDURE — 71275 CT ANGIOGRAPHY CHEST: CPT

## 2022-11-16 PROCEDURE — 93005 ELECTROCARDIOGRAM TRACING: CPT

## 2022-11-16 RX ORDER — SODIUM CHLORIDE, SODIUM LACTATE, POTASSIUM CHLORIDE, AND CALCIUM CHLORIDE .6; .31; .03; .02 G/100ML; G/100ML; G/100ML; G/100ML
500 INJECTION, SOLUTION INTRAVENOUS ONCE
Status: COMPLETED | OUTPATIENT
Start: 2022-11-16 | End: 2022-11-16

## 2022-11-16 RX ADMIN — IOPAMIDOL 75 ML: 755 INJECTION, SOLUTION INTRAVENOUS at 02:10

## 2022-11-16 RX ADMIN — SODIUM CHLORIDE, POTASSIUM CHLORIDE, SODIUM LACTATE AND CALCIUM CHLORIDE 500 ML: 600; 310; 30; 20 INJECTION, SOLUTION INTRAVENOUS at 01:22

## 2022-11-16 ASSESSMENT — ENCOUNTER SYMPTOMS
EYE DISCHARGE: 0
CHEST TIGHTNESS: 0
APNEA: 0
BACK PAIN: 0
SHORTNESS OF BREATH: 1
EYE ITCHING: 0
COLOR CHANGE: 0

## 2022-11-16 ASSESSMENT — PAIN - FUNCTIONAL ASSESSMENT: PAIN_FUNCTIONAL_ASSESSMENT: NONE - DENIES PAIN

## 2022-11-16 NOTE — ED NOTES
Radiology Procedure Waiver   Name: Bita Ruvalcaba  : 1945  MRN: [id number]    Date:  22    Time: 1:12 AM EST    Benefits of immediately proceeding with Radiology exam(s) without pre-testing outweigh the risks or are not indicated as specified below and therefore the following is/are being waived:    [] Pregnancy test   [] Patients LMP on-time and regular.   [] Patient had Tubal Ligation or has other Contraception Device. [] Patient  is Menopausal or Premenarcheal.    [] Patient had Full or Partial Hysterectomy. [] Protocol for Iodine allergy    [] MRI Questionnaire     [x] BUN/Creatinine   [] Patient age w/no hx of renal dysfunction. [] Patient on Dialysis. [] Recent Normal Labs.   Electronically signed by Ricarda Culver DO on 22 at 1:12 AM EST           Emergent for concern for PE      Ricarda Culver DO  Resident  22 1849

## 2022-11-16 NOTE — FLOWSHEET NOTE
Pt presents to ED for SOB. Pt is currently resting comfortably at this time and speaking full sentences. Pt states that he becomes SOB with minimal exertion. Pt denies any chest pain or dizziness. Pt wife bedside.

## 2022-11-16 NOTE — ED PROVIDER NOTES
Oneda Odor 76 y.o. male PHMx of DVT, PE, YUDY, dyspnea, kidney cancer presents to the ED c/o shortness of breath and tachycardia and recent DVT. Onset: Past couple days, worsening today. Location/Radiation: Denies any central area of chest pain. Duration: Constant. Characterization: Short of breath at rest and activity. Aggravating Factors: Physical activity. Relieving Factors: None. Severity: Moderate. Assx Sxs: Shortness of breath, tachycardia, unilateral leg swelling on the right. He Denies: Fevers/chills, palpitations, chest pain, abdominal pain, nausea/vomiting. Review of Systems   Constitutional:  Negative for chills and fever. Eyes:  Negative for discharge and itching. Respiratory:  Positive for shortness of breath. Negative for apnea and chest tightness. Cardiovascular:  Positive for leg swelling. Endocrine: Negative for cold intolerance. Genitourinary:  Negative for dysuria and flank pain. Musculoskeletal:  Negative for arthralgias and back pain. Skin:  Negative for color change and pallor. Allergic/Immunologic: Negative for environmental allergies and food allergies. Neurological:  Negative for dizziness and facial asymmetry. Hematological:  Negative for adenopathy. Does not bruise/bleed easily. Psychiatric/Behavioral:  Negative for agitation and behavioral problems. Physical Exam  Constitutional:       General: He is not in acute distress. Appearance: He is obese. He is ill-appearing. He is not toxic-appearing. HENT:      Head: Normocephalic and atraumatic. Mouth/Throat:      Pharynx: Oropharynx is clear. Eyes:      Extraocular Movements: Extraocular movements intact. Pupils: Pupils are equal, round, and reactive to light. Neck:      Thyroid: No thyromegaly. Vascular: No hepatojugular reflux. Trachea: No tracheal deviation. Cardiovascular:      Rate and Rhythm: Regular rhythm. Tachycardia present.    Pulmonary:      Effort: Pulmonary effort is normal.      Breath sounds: Normal breath sounds. No decreased breath sounds, wheezing, rhonchi or rales. Musculoskeletal:      Cervical back: Normal range of motion and neck supple. Skin:     General: Skin is warm and dry. Capillary Refill: Capillary refill takes less than 2 seconds. Coloration: Skin is cyanotic. Neurological:      General: No focal deficit present. Mental Status: He is alert and oriented to person, place, and time. Psychiatric:         Mood and Affect: Mood normal.         Behavior: Behavior normal.        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)  Diagnosis management comments: Is a 66-year-old male with a complicated past medical history of renal cell carcinoma and likely metastatic cancer, and DVT in the right lower extremity. Reports he has been on Eliquis starting pack for the past week. Shortness of breath this new onset. On evaluation patient he was AOx3, tachycardic, afebrile, nonhypoxic on room air, normotensive. COVID/flu/RSV negative. White blood cell count 5.7, H&H normal.  Electrolytes and kidney function within acceptable limits. 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 Due to the strong concern of possible PE it was decided that patient warranted emergent CTA pulmonary with contrast due to his tachycardia and shortness of breath and confirmed DVT in the right lower extremity. Patient and his wife were hesitant at first of receiving contrast due to him having 1 kidney. Patient and his wife were given free time to discuss the risk/benefits/reports of receiving the CTA with contrast.  Patient and his wife were counseled on emergent need for this to examine for possible pulmonary embolism.   Patient is wife consented to the plan of receiving contrast.  He was given 500 mL bolus of lactated Ringer's before receiving contrast.  Patient and his wife again verbally consents and agreed to this plan. CTA pulmonary of contrast showed no PE. However, did show multiple pulmonary nodules which were very concerning for metastatic disease process. He already has a oncologist at [facility] that he follows up with, and he has a primary care physician appointment on same day. Patient remained nonhypoxic on room air all throughout his ER stay. Patient and his wife were counseled on these pulmonary findings social work very concerning for cancer. They were counseled on the importance of following up with her oncologist at [facility] and they verbally consented agreed to this. I also have a appointment with his primary care physician Dr. Mohan Back on 11/16/2022. Patient is medically cleared for discharge home. Return precautions ER given. Patient stable at time of discharge       Amount and/or Complexity of Data Reviewed  Decide to obtain previous medical records or to obtain history from someone other than the patient: yes         ED Course as of 11/16/22 0613 Wed Nov 16, 2022 0348 EKG: This EKG is signed and interpreted by EP. Rate: 104  Rhythm: Sinus  Interpretation: no acute changes  Comparison: was normal and stable as compared to patient's most recent EKG  [JR]      ED Course User Index  [JR] Sania Funez DO         ED Course as of 11/16/22 0613 Wed Nov 16, 2022 0348 EKG: This EKG is signed and interpreted by EP.     Rate: 104  Rhythm: Sinus  Interpretation: no acute changes  Comparison: was normal and stable as compared to patient's most recent EKG  [JR]      ED Course User Index  [JR] Sania Funez DO       --------------------------------------------- PAST HISTORY ---------------------------------------------  Past Medical History:  has a past medical history of Acute left lumbar radiculopathy, YUDY (acute kidney injury) ([facility] [address] Anxiety and depression, Cancer ([facility] [address] Cerebral atrophy ([facility] [address] Disc displacement, lumbar, History of pulmonary embolus (PE), Hx of deep venous thrombosis, Kidney disease, Nerve injury, Parkinson disease ([facility] [address] Pneumonia, Pulmonary embolism ([facility] [address] Renal carcinoma ([facility] [address] Rhinovirus infection, and 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 Past Surgical History:  has a past surgical history that includes Cataract removal; back surgery; eye surgery; Endoscopy, colon, diagnostic; total nephrectomy (Right, 11/2013); IVC filter insertion (11/27/2013); and IVC filter removal (02/24/2014). Social History:  reports that he quit smoking about 42 years ago. His smoking use included cigars. He has never used smokeless tobacco. He reports current alcohol use. He reports that he does not use drugs. Family History: family history includes Cancer in his sister; Diabetes in his brother; Heart Disease in his father and mother. The patients home medications have been reviewed.     Allergies: Tape [adhesive tape]    -------------------------------------------------- RESULTS -------------------------------------------------  Labs:  Results for orders placed or performed during the hospital encounter of 11/16/22   Rapid RSV Antigen    Specimen: Nasopharyngeal Swab   Result Value Ref Range    RSV by PCR Negative Negative   COVID-19, Rapid    Specimen: Nasopharyngeal Swab   Result Value Ref Range    SARS-CoV-2, NAAT Not Detected Not Detected   RAPID INFLUENZA A/B ANTIGENS    Specimen: Nasopharyngeal   Result Value Ref Range    Influenza A by PCR Not Detected Not Detected    Influenza B by PCR Not Detected Not Detected   CBC with Auto Differential   Result Value Ref Range    WBC 5.7 4.5 - 11.5 E9/L    RBC 4.75 3.80 - 5.80 E12/L    Hemoglobin 15.2 12.5 - 16.5 g/dL    Hematocrit 46.4 37.0 - 54.0 %    MCV 97.7 80.0 - 99.9 fL    MCH 32.0 26.0 - 35.0 pg    MCHC 32.8 32.0 - 34.5 %    RDW 12.0 11.5 - 15.0 fL    Platelets 640 741 - 742 E9/L MPV 9.9 7.0 - 12.0 fL    Neutrophils % 59.6 43.0 - 80.0 %    Immature Granulocytes % 0.3 0.0 - 5.0 %    Lymphocytes % 26.0 20.0 - 42.0 %    Monocytes % 10.8 2.0 - 12.0 %    Eosinophils % 2.6 0.0 - 6.0 %    Basophils % 0.7 0.0 - 2.0 %    Neutrophils Absolute 3.41 1.80 - 7.30 E9/L    Immature Granulocytes # 0.02 E9/L    Lymphocytes Absolute 1.49 (L) 1.50 - 4.00 E9/L    Monocytes Absolute 0.62 0.10 - 0.95 E9/L    Eosinophils Absolute 0.15 0.05 - 0.50 E9/L    Basophils Absolute 0.04 0.00 - 0.20 E9/L   CMP   Result Value Ref Range    Sodium 138 132 - 146 mmol/L    Potassium 4.2 3.5 - 5.0 mmol/L    Chloride 106 98 - 107 mmol/L    CO2 23 22 - 29 mmol/L    Anion Gap 9 7 - 16 mmol/L    Glucose 95 74 - 99 mg/dL    BUN 21 6 - 23 mg/dL    Creatinine 1.3 (H) 0.7 - 1.2 mg/dL    Est, Glom Filt Rate 56 >=60 mL/min/1.73    Calcium 9.5 8.6 - 10.2 mg/dL    Total Protein 6.4 6.4 - 8.3 g/dL    Albumin 3.9 3.5 - 5.2 g/dL    Total Bilirubin 0.6 0.0 - 1.2 mg/dL    Alkaline Phosphatase 94 40 - 129 U/L    ALT <5 0 - 40 U/L    AST 16 0 - 39 U/L   Protime-INR   Result Value Ref Range    Protime 14.6 (H) 9.3 - 12.4 sec    INR 1.3    Troponin   Result Value Ref Range    Troponin, High Sensitivity 18 (H) 0 - 11 ng/L   Troponin   Result Value Ref Range    Troponin, High Sensitivity 18 (H) 0 - 11 ng/L   Brain Natriuretic Peptide   Result Value Ref Range    Pro- 0 - 450 pg/mL       Radiology:  CTA PULMONARY W CONTRAST   Final Result   No evidence of pulmonary embolism or acute pulmonary abnormality. Calcified mediastinal and hilar lymph nodes, likely sequela of prior   granulomatous process. Interval enlargement of medial left lower lobe pulmonary nodule, now   measuring approximately 9 mm. Given history of malignancy, pulmonary   metastasis is not excluded.       Interval increase in retroperitoneal lymph node in the upper abdomen,   concerning for malignancy, particularly in this patient with reported history   of renal cell carcinoma. New lobulated hyperattenuating structures within the left shoulder   musculature, concerning for enhancing soft tissue metastases. XR CHEST PORTABLE   Final Result   Coarse interstitial markings, atherosclerotic disease, and mild cardiomegaly. Fullness of the bilateral pulmonary tiffanie. No acute cardiopulmonary pathology. ------------------------- NURSING NOTES AND VITALS REVIEWED ---------------------------  Date / Time Roomed:  11/16/2022 12:37 AM  ED Bed Assignment:  23/23    The nursing notes within the ED encounter and vital signs as below have been reviewed. BP (!) 179/80   Pulse 90   Temp 98.6 °F (37 °C)   Resp 18   Ht 6' (1.829 m)   Wt 224 lb (101.6 kg)   SpO2 95%   BMI 30.38 kg/m²   Oxygen Saturation Interpretation: Normal      ------------------------------------------ PROGRESS NOTES ------------------------------------------  3:51 AM EST  I have spoken with the patient and discussed todays results, in addition to providing specific details for the plan of care and counseling regarding the diagnosis and prognosis. Their questions are answered at this time and they are agreeable with the plan. I discussed at length with them reasons for immediate return here for re evaluation. They will followup with their primary care physician by calling their office tomorrow. --------------------------------- ADDITIONAL PROVIDER NOTES ---------------------------------  At this time the patient is without objective evidence of an acute process requiring hospitalization or inpatient management. They have remained hemodynamically stable throughout their entire ED visit and are stable for discharge with outpatient follow-up. The plan has been discussed in detail and they are aware of the specific conditions for emergent return, as well as the importance of follow-up. Discharge Medication List as of 11/16/2022  3:50 AM          Diagnosis:  1.  Dyspnea, unspecified type    2. Lymphadenopathy    3. Lung nodule    4. 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       Gabriella Silveira DO  Resident  11/16/22 1044

## 2022-11-18 LAB
EKG ATRIAL RATE: 104 BPM
EKG P AXIS: 49 DEGREES
EKG P-R INTERVAL: 154 MS
EKG Q-T INTERVAL: 368 MS
EKG QRS DURATION: 112 MS
EKG QTC CALCULATION (BAZETT): 483 MS
EKG R AXIS: -20 DEGREES
EKG T AXIS: 25 DEGREES
EKG VENTRICULAR RATE: 104 BPM

## 2022-11-18 PROCEDURE — 93010 ELECTROCARDIOGRAM REPORT: CPT | Performed by: INTERNAL MEDICINE

## 2023-05-11 ENCOUNTER — HOSPITAL ENCOUNTER (EMERGENCY)
Age: 78
Discharge: HOME | End: 2023-05-11
Attending: EMERGENCY MEDICINE
Payer: MEDICARE

## 2023-05-11 VITALS
DIASTOLIC BLOOD PRESSURE: 79 MMHG | RESPIRATION RATE: 16 BRPM | HEART RATE: 94 BPM | HEIGHT: 72 IN | SYSTOLIC BLOOD PRESSURE: 137 MMHG | WEIGHT: 225 LBS | TEMPERATURE: 98.1 F | BODY MASS INDEX: 30.48 KG/M2 | OXYGEN SATURATION: 93 %

## 2023-05-11 DIAGNOSIS — E86.0 MILD DEHYDRATION: ICD-10-CM

## 2023-05-11 DIAGNOSIS — R25.2 BILATERAL LEG CRAMPS: Primary | ICD-10-CM

## 2023-05-11 LAB
ALBUMIN SERPL-MCNC: 4.1 G/DL (ref 3.5–5.2)
ALP SERPL-CCNC: 82 U/L (ref 40–129)
ALT SERPL-CCNC: <5 U/L (ref 0–40)
ANION GAP SERPL CALCULATED.3IONS-SCNC: 11 MMOL/L (ref 7–16)
AST SERPL-CCNC: 18 U/L (ref 0–39)
BACTERIA URNS QL MICRO: NORMAL /HPF
BASOPHILS # BLD: 0.04 E9/L (ref 0–0.2)
BASOPHILS NFR BLD: 0.8 % (ref 0–2)
BILIRUB SERPL-MCNC: 0.8 MG/DL (ref 0–1.2)
BILIRUB UR QL STRIP: NEGATIVE
BUN SERPL-MCNC: 24 MG/DL (ref 6–23)
CALCIUM SERPL-MCNC: 9.4 MG/DL (ref 8.6–10.2)
CHLORIDE SERPL-SCNC: 105 MMOL/L (ref 98–107)
CK SERPL-CCNC: 81 U/L (ref 20–200)
CLARITY UR: CLEAR
CO2 SERPL-SCNC: 24 MMOL/L (ref 22–29)
COLOR UR: YELLOW
CREAT SERPL-MCNC: 1.4 MG/DL (ref 0.7–1.2)
EOSINOPHIL # BLD: 0.08 E9/L (ref 0.05–0.5)
EOSINOPHIL NFR BLD: 1.6 % (ref 0–6)
ERYTHROCYTE [DISTWIDTH] IN BLOOD BY AUTOMATED COUNT: 12.7 FL (ref 11.5–15)
GLUCOSE SERPL-MCNC: 108 MG/DL (ref 74–99)
GLUCOSE UR STRIP-MCNC: NEGATIVE MG/DL
HCT VFR BLD AUTO: 46.2 % (ref 37–54)
HGB BLD-MCNC: 15 G/DL (ref 12.5–16.5)
HGB UR QL STRIP: NEGATIVE
IMM GRANULOCYTES # BLD: 0.03 E9/L
IMM GRANULOCYTES NFR BLD: 0.6 % (ref 0–5)
KETONES UR STRIP-MCNC: NEGATIVE MG/DL
LACTATE BLDV-SCNC: 1.3 MMOL/L (ref 0.5–2.2)
LEUKOCYTE ESTERASE UR QL STRIP: NEGATIVE
LYMPHOCYTES # BLD: 1.42 E9/L (ref 1.5–4)
LYMPHOCYTES NFR BLD: 28 % (ref 20–42)
MAGNESIUM SERPL-MCNC: 1.9 MG/DL (ref 1.6–2.6)
MCH RBC QN AUTO: 31.4 PG (ref 26–35)
MCHC RBC AUTO-ENTMCNC: 32.5 % (ref 32–34.5)
MCV RBC AUTO: 96.7 FL (ref 80–99.9)
MONOCYTES # BLD: 0.62 E9/L (ref 0.1–0.95)
MONOCYTES NFR BLD: 12.2 % (ref 2–12)
NEUTROPHILS # BLD: 2.89 E9/L (ref 1.8–7.3)
NEUTS SEG NFR BLD: 56.8 % (ref 43–80)
NITRITE UR QL STRIP: NEGATIVE
PH UR STRIP: 6 [PH] (ref 5–9)
PLATELET # BLD AUTO: 135 E9/L (ref 130–450)
PMV BLD AUTO: 10.1 FL (ref 7–12)
POTASSIUM SERPL-SCNC: 4.1 MMOL/L (ref 3.5–5)
PROT SERPL-MCNC: 6.8 G/DL (ref 6.4–8.3)
PROT UR STRIP-MCNC: NEGATIVE MG/DL
RBC # BLD AUTO: 4.78 E12/L (ref 3.8–5.8)
RBC #/AREA URNS HPF: NORMAL /HPF (ref 0–2)
SODIUM SERPL-SCNC: 140 MMOL/L (ref 132–146)
SP GR UR STRIP: <=1.005 (ref 1–1.03)
UROBILINOGEN UR STRIP-ACNC: 0.2 E.U./DL
WBC # BLD: 5.1 E9/L (ref 4.5–11.5)
WBC #/AREA URNS HPF: NORMAL /HPF (ref 0–5)

## 2023-05-11 PROCEDURE — 80053 COMPREHEN METABOLIC PANEL: CPT

## 2023-05-11 PROCEDURE — 96360 HYDRATION IV INFUSION INIT: CPT

## 2023-05-11 PROCEDURE — 83735 ASSAY OF MAGNESIUM: CPT

## 2023-05-11 PROCEDURE — 81001 URINALYSIS AUTO W/SCOPE: CPT

## 2023-05-11 PROCEDURE — 2580000003 HC RX 258: Performed by: EMERGENCY MEDICINE

## 2023-05-11 PROCEDURE — 83605 ASSAY OF LACTIC ACID: CPT

## 2023-05-11 PROCEDURE — 99284 EMERGENCY DEPT VISIT MOD MDM: CPT

## 2023-05-11 PROCEDURE — 36415 COLL VENOUS BLD VENIPUNCTURE: CPT

## 2023-05-11 PROCEDURE — 82550 ASSAY OF CK (CPK): CPT

## 2023-05-11 PROCEDURE — 85025 COMPLETE CBC W/AUTO DIFF WBC: CPT

## 2023-05-11 RX ORDER — 0.9 % SODIUM CHLORIDE 0.9 %
1000 INTRAVENOUS SOLUTION INTRAVENOUS ONCE
Status: COMPLETED | OUTPATIENT
Start: 2023-05-11 | End: 2023-05-11

## 2023-05-11 RX ADMIN — SODIUM CHLORIDE 1000 ML: 9 INJECTION, SOLUTION INTRAVENOUS at 03:42

## 2023-05-11 ASSESSMENT — ENCOUNTER SYMPTOMS
VOMITING: 0
SHORTNESS OF BREATH: 0
BACK PAIN: 0
NAUSEA: 0
COUGH: 0
COLOR CHANGE: 0

## 2023-05-11 ASSESSMENT — PAIN - FUNCTIONAL ASSESSMENT: PAIN_FUNCTIONAL_ASSESSMENT: NONE - DENIES PAIN

## 2023-05-11 NOTE — ED NOTES
Pt verbalized understanding of d/c instructions. Pt left in stable condition via WC.      Ysabel Scruggs RN  05/11/23 [id number]

## 2023-05-11 NOTE — ED PROVIDER NOTES
5/11/2023 2:26 AM      NOTE: This report was transcribed using voice recognition software. Every effort was made to ensure accuracy; however, inadvertent computerized transcription errors may be present              DO Valery Lemus  05/11/23 1036

## 2023-06-21 ENCOUNTER — HOSPITAL ENCOUNTER (OUTPATIENT)
Dept: ULTRASOUND IMAGING | Age: 78
Discharge: HOME | End: 2023-06-23
Payer: MEDICARE

## 2023-06-21 DIAGNOSIS — R22.41 LOCALIZED SWELLING OF RIGHT LOWER LEG: ICD-10-CM

## 2023-06-21 PROCEDURE — 93971 EXTREMITY STUDY: CPT

## 2023-07-28 ENCOUNTER — TRANSCRIBE ORDERS (OUTPATIENT)
Dept: ADMINISTRATIVE | Age: 78
End: 2023-07-28

## 2023-07-28 DIAGNOSIS — R13.19 DYSPHAGIA, NEUROLOGIC: Primary | ICD-10-CM

## 2023-08-05 ENCOUNTER — APPOINTMENT (OUTPATIENT)
Dept: GENERAL RADIOLOGY | Age: 78
End: 2023-08-05
Payer: MEDICARE

## 2023-08-05 ENCOUNTER — HOSPITAL ENCOUNTER (EMERGENCY)
Age: 78
Discharge: HOME | End: 2023-08-05
Attending: EMERGENCY MEDICINE
Payer: MEDICARE

## 2023-08-05 VITALS
RESPIRATION RATE: 16 BRPM | HEIGHT: 72 IN | TEMPERATURE: 98.4 F | WEIGHT: 225 LBS | BODY MASS INDEX: 30.48 KG/M2 | SYSTOLIC BLOOD PRESSURE: 111 MMHG | OXYGEN SATURATION: 95 % | DIASTOLIC BLOOD PRESSURE: 80 MMHG | HEART RATE: 91 BPM
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
## 2023-08-05 DIAGNOSIS — S40.011A CONTUSION OF RIGHT SHOULDER, INITIAL ENCOUNTER: ICD-10-CM

## 2023-08-05 PROCEDURE — 71101 X-RAY EXAM UNILAT RIBS/CHEST: CPT

## 2023-08-05 PROCEDURE — 6370000000 HC RX 637 (ALT 250 FOR IP): Performed by: EMERGENCY MEDICINE

## 2023-08-05 PROCEDURE — 73030 X-RAY EXAM OF SHOULDER: CPT

## 2023-08-05 PROCEDURE — 99283 EMERGENCY DEPT VISIT LOW MDM: CPT

## 2023-08-05 RX ORDER — ACETAMINOPHEN 500 MG
1000 TABLET ORAL ONCE
Status: COMPLETED | OUTPATIENT
Start: 2023-08-05 | End: 2023-08-05

## 2023-08-05 RX ADMIN — ACETAMINOPHEN 1000 MG: 500 TABLET ORAL at 10:11

## 2023-08-05 ASSESSMENT — PAIN SCALES - GENERAL: PAINLEVEL_OUTOF10: 7

## 2023-08-31 ENCOUNTER — HOSPITAL ENCOUNTER (OUTPATIENT)
Dept: GENERAL RADIOLOGY | Age: 78
Discharge: HOME | End: 2023-09-02
Attending: INTERNAL MEDICINE
Payer: MEDICARE

## 2023-08-31 DIAGNOSIS — R13.19 DYSPHAGIA, NEUROLOGIC: ICD-10-CM

## 2023-08-31 PROCEDURE — 74230 X-RAY XM SWLNG FUNCJ C+: CPT

## 2023-08-31 PROCEDURE — 92611 MOTION FLUOROSCOPY/SWALLOW: CPT

## 2023-08-31 PROCEDURE — 2500000003 HC RX 250 WO HCPCS: Performed by: RADIOLOGY

## 2023-08-31 PROCEDURE — 92526 ORAL FUNCTION THERAPY: CPT

## 2023-08-31 RX ADMIN — BARIUM SULFATE 10 ML: 400 PASTE ORAL at 14:24

## 2023-08-31 RX ADMIN — BARIUM SULFATE 70 G: 0.81 POWDER, FOR SUSPENSION ORAL at 14:25

## 2023-08-31 RX ADMIN — BARIUM SULFATE 120 ML: 400 SUSPENSION ORAL at 14:25

## 2023-08-31 NOTE — PROGRESS NOTES
thin liquid and nectar consistency liquid due to  inadequate laryngeal closure which remained in the laryngeal vestibule. and penetrated deep into the laryngeal vestibule (to the level of the true vocal folds) with nectar thick and remained above level of true vocal folds with thin liquid. Laryngeal penetration was mild and occurred inconsistently   an absent cough/throat clear was noted    ASPIRATION  Aspiration  was not present during this evaluation however there is high risk for aspiration of nectar consistency liquid  due to laryngeal penetration    PENETRATION-ASPIRATION SCALE (PAS):  THIN 3 = Material enters the airway, remains above the vocal folds, and is not ejected from the airway--mild amnt remaining and noted inconsistently   MILDLY THICK 5 = Material enters the airway, contacts the vocal folds, and is not ejected from the airway  MODERATELY THICK 1 = Material does not enter the airway  PUREE 1 = Material does not enter the airway  HARD SOLID 1 = Material does not enter the airway       COMPENSATORY STRATEGIES    Compensatory strategies that were beneficial included Double swallow, Small bites/sips, and Alternate solids and liquids      STRUCTURAL/FUNCTIONAL ANOMALIES   No structural/functional anomalies were noted    CERVICAL ESOPHAGEAL STAGE :     Was not assessed          ___________    Cognition:   Within functional limits for this exam    Oral Peripheral Examination   Generalized oral weakness--mild noted     Current Respiratory Status   room air     Parameters of Speech Production  Respiration:  Adequate for speech production  Quality:   Within functional limits  Intensity: Within functional limits    Pain: No pain reported. EDUCATION:   The Speech Language Pathologist (SLP) completed education regarding results of evaluation and that intervention is warranted at this time.   Learner: Patient and Significant Other  Education: Reviewed results and recommendations of this evaluation, Reviewed

## 2023-09-06 ENCOUNTER — HOSPITAL ENCOUNTER (OUTPATIENT)
Dept: GENERAL RADIOLOGY | Age: 78
Discharge: HOME | End: 2023-09-08
Attending: INTERNAL MEDICINE
Payer: MEDICARE

## 2023-09-06 DIAGNOSIS — R13.19 DYSPHAGIA, NEUROLOGIC: ICD-10-CM

## 2023-09-06 PROCEDURE — 74220 X-RAY XM ESOPHAGUS 1CNTRST: CPT

## 2023-09-06 PROCEDURE — 2500000003 HC RX 250 WO HCPCS: Performed by: RADIOLOGY

## 2023-09-06 RX ADMIN — BARIUM SULFATE 140 ML: 980 POWDER, FOR SUSPENSION ORAL at 09:44

## 2023-09-06 RX ADMIN — BARIUM SULFATE 1 TABLET: 700 TABLET ORAL at 09:44

## 2023-09-06 RX ADMIN — BARIUM SULFATE 176 G: 960 POWDER, FOR SUSPENSION ORAL at 09:45

## 2023-11-15 ENCOUNTER — APPOINTMENT (OUTPATIENT)
Dept: CT IMAGING | Age: 78
End: 2023-11-15
Payer: MEDICARE

## 2023-11-15 ENCOUNTER — HOSPITAL ENCOUNTER (EMERGENCY)
Age: 78
Discharge: HOME | End: 2023-11-16
Payer: MEDICARE

## 2023-11-15 DIAGNOSIS — R10.32 ABDOMINAL PAIN, LEFT LOWER QUADRANT: Primary | ICD-10-CM

## 2023-11-15 LAB
ALBUMIN SERPL-MCNC: 4.2 G/DL (ref 3.5–5.2)
ALP SERPL-CCNC: 83 U/L (ref 40–129)
ALT SERPL-CCNC: <5 U/L (ref 0–40)
ANION GAP SERPL CALCULATED.3IONS-SCNC: 10 MMOL/L (ref 7–16)
AST SERPL-CCNC: 15 U/L (ref 0–39)
BASOPHILS # BLD: 0.02 K/UL (ref 0–0.2)
BASOPHILS NFR BLD: 0 % (ref 0–2)
BILIRUB SERPL-MCNC: 0.7 MG/DL (ref 0–1.2)
BILIRUB UR QL STRIP: NEGATIVE
BUN SERPL-MCNC: 19 MG/DL (ref 6–23)
CALCIUM SERPL-MCNC: 9.5 MG/DL (ref 8.6–10.2)
CHLORIDE SERPL-SCNC: 106 MMOL/L (ref 98–107)
CLARITY UR: CLEAR
CO2 SERPL-SCNC: 25 MMOL/L (ref 22–29)
COLOR UR: YELLOW
COMMENT: NORMAL
CREAT SERPL-MCNC: 1.2 MG/DL (ref 0.7–1.2)
EOSINOPHIL # BLD: 0.02 K/UL (ref 0.05–0.5)
EOSINOPHILS RELATIVE PERCENT: 0 % (ref 0–6)
ERYTHROCYTE [DISTWIDTH] IN BLOOD BY AUTOMATED COUNT: 12.8 % (ref 11.5–15)
GFR SERPL CREATININE-BSD FRML MDRD: >60 ML/MIN/1.73M2
GLUCOSE SERPL-MCNC: 155 MG/DL (ref 74–99)
GLUCOSE UR STRIP-MCNC: NEGATIVE MG/DL
HCT VFR BLD AUTO: 47.4 % (ref 37–54)
HGB BLD-MCNC: 15.5 G/DL (ref 12.5–16.5)
HGB UR QL STRIP.AUTO: NEGATIVE
IMM GRANULOCYTES # BLD AUTO: <0.03 K/UL (ref 0–0.58)
IMM GRANULOCYTES NFR BLD: 0 % (ref 0–5)
KETONES UR STRIP-MCNC: NEGATIVE MG/DL
LACTATE BLDV-SCNC: 1.2 MMOL/L (ref 0.5–2.2)
LEUKOCYTE ESTERASE UR QL STRIP: NEGATIVE
LYMPHOCYTES NFR BLD: 0.97 K/UL (ref 1.5–4)
LYMPHOCYTES RELATIVE PERCENT: 18 % (ref 20–42)
MCH RBC QN AUTO: 31.6 PG (ref 26–35)
MCHC RBC AUTO-ENTMCNC: 32.7 G/DL (ref 32–34.5)
MCV RBC AUTO: 96.7 FL (ref 80–99.9)
MONOCYTES NFR BLD: 0.42 K/UL (ref 0.1–0.95)
MONOCYTES NFR BLD: 8 % (ref 2–12)
NEUTROPHILS NFR BLD: 73 % (ref 43–80)
NEUTS SEG NFR BLD: 3.94 K/UL (ref 1.8–7.3)
NITRITE UR QL STRIP: NEGATIVE
PH UR STRIP: 6 [PH] (ref 5–9)
PLATELET # BLD AUTO: 157 K/UL (ref 130–450)
PMV BLD AUTO: 10.1 FL (ref 7–12)
POTASSIUM SERPL-SCNC: 4.6 MMOL/L (ref 3.5–5)
PROT SERPL-MCNC: 6.8 G/DL (ref 6.4–8.3)
PROT UR STRIP-MCNC: NEGATIVE MG/DL
RBC # BLD AUTO: 4.9 M/UL (ref 3.8–5.8)
SODIUM SERPL-SCNC: 141 MMOL/L (ref 132–146)
SP GR UR STRIP: 1.02 (ref 1–1.03)
UROBILINOGEN UR STRIP-ACNC: 1 EU/DL (ref 0–1)
WBC OTHER # BLD: 5.4 K/UL (ref 4.5–11.5)

## 2023-11-15 PROCEDURE — 83605 ASSAY OF LACTIC ACID: CPT

## 2023-11-15 PROCEDURE — 74176 CT ABD & PELVIS W/O CONTRAST: CPT

## 2023-11-15 PROCEDURE — 99284 EMERGENCY DEPT VISIT MOD MDM: CPT

## 2023-11-15 PROCEDURE — 85025 COMPLETE CBC W/AUTO DIFF WBC: CPT

## 2023-11-15 PROCEDURE — 80053 COMPREHEN METABOLIC PANEL: CPT

## 2023-11-15 PROCEDURE — 81003 URINALYSIS AUTO W/O SCOPE: CPT

## 2023-11-15 RX ORDER — 0.9 % SODIUM CHLORIDE 0.9 %
500 INTRAVENOUS SOLUTION INTRAVENOUS ONCE
Status: DISCONTINUED | OUTPATIENT
Start: 2023-11-15 | End: 2023-11-16 | Stop reason: HOSPADM

## 2023-11-15 ASSESSMENT — PAIN - FUNCTIONAL ASSESSMENT: PAIN_FUNCTIONAL_ASSESSMENT: 0-10

## 2023-11-15 ASSESSMENT — PAIN DESCRIPTION - PAIN TYPE: TYPE: ACUTE PAIN

## 2023-11-15 ASSESSMENT — PAIN DESCRIPTION - LOCATION: LOCATION: ABDOMEN

## 2023-11-15 ASSESSMENT — PAIN DESCRIPTION - FREQUENCY: FREQUENCY: CONTINUOUS

## 2023-11-15 ASSESSMENT — PAIN SCALES - GENERAL: PAINLEVEL_OUTOF10: 4

## 2023-11-15 ASSESSMENT — PAIN DESCRIPTION - ORIENTATION: ORIENTATION: LEFT;LOWER

## 2023-11-15 NOTE — ED NOTES
Department of Emergency Medicine  FIRST PROVIDER TRIAGE NOTE             Independent MLP           11/15/23  4:00 PM EST    Date of Encounter: 11/15/23   MRN: [id number]      HPI: Cinthia Rivera is a 78 y.o. male who presents to the ED for Abdominal Pain (abd pain onset last evening)     Left lower quadrant abdominal pain. Denies any fevers. ROS: Negative for cp, sob, or fever. PE: Gen Appearance/Constitutional: alert  Musculoskeletal: moves all extremities x 4. Ambulates with a cane. Initial Plan of Care: All treatment areas with department are currently occupied. Plan to order/Initiate the following while awaiting opening in ED: labs.   Initiate Treatment-Testing, Proceed toTreatment Area When Bed Available for ED Attending/MLP to Continue Care    Electronically signed by LIDIA Hanson CNP   DD: 11/15/23       LIDIA Hanson CNP  11/15/23 1600

## 2023-11-16 VITALS
HEART RATE: 89 BPM | HEIGHT: 72 IN | OXYGEN SATURATION: 96 % | RESPIRATION RATE: 18 BRPM | DIASTOLIC BLOOD PRESSURE: 88 MMHG | WEIGHT: 222 LBS | SYSTOLIC BLOOD PRESSURE: 178 MMHG | TEMPERATURE: 97.9 F | BODY MASS INDEX: 30.07 KG/M2

## 2023-11-16 NOTE — ED PROVIDER NOTES
Independent LANA Visit. [facility]  Department of Emergency Medicine   ED  Encounter Note  Admit Date/RoomTime: 11/15/2023  8:41 PM  ED Room: YUMIKO/YUMIKO    NAME: Nasima Dickerson  : 1945  MRN: [id number]     Chief Complaint:  Abdominal Pain (abd pain onset last evening)    History of Present Illness        Nasima Dickerson is a 78 y.o. old male who presents to the emergency department by private vehicle, for gradual onset, persistent aching pain in the LLQ without radiation which began 1 day(s) prior to arrival.  There has been no similar episodes in the past .   Since onset the symptoms have been waxing and waning. The pain is associated with no additional symptoms. The pain is aggravated by nothing in particular and relieved by nothing. There has been NO chest pain, shortness of breath, fever, chills, nausea, vomiting, diarrhea, blood in stool, dysuria, urinary urgency, or urinary retention. Patient reports he does have constipation. Wife is present with him and reports she gives him a glycerin suppository every day. Sometimes he has an enema usually 2 or 3 times per week. Patient has Parkinson's disease. He has followed with gastroenterology and had colonoscopies. He has been given some type of Colace which helped when he was using it but he forgets to use it. Wife reports he is not eat a lot of fruits and vegetables. Family doctor sent in for CT scan    . ROS   Pertinent positives and negatives are stated within HPI, all other systems reviewed and are negative.     Past Medical History:  has a past medical history of Acute left lumbar radiculopathy, YUDY (acute kidney injury) ([address]), Anxiety and depression, Cancer ([address]), Cerebral atrophy ([address]), Disc displacement, lumbar, History of pulmonary embolus (PE), Hx of deep venous thrombosis, Kidney disease, Nerve injury, Parkinson disease ([address]), Pneumonia, Pulmonary embolism ([address]), Renal carcinoma ([address]), Rhinovirus infection, and S/P

## 2024-02-19 ENCOUNTER — TELEPHONE (OUTPATIENT)
Dept: VASCULAR SURGERY | Age: 79
End: 2024-02-19

## 2024-02-19 NOTE — TELEPHONE ENCOUNTER
Received referral from Dr. Trujillo for IVC filter placement prior to TKA.   Spoke with patient's wife, scheduled appt with Dr. Skelton on 3-5-24 at 10:30 am and filter insertion on 3-20-24.

## 2024-02-24 ENCOUNTER — APPOINTMENT (OUTPATIENT)
Dept: GENERAL RADIOLOGY | Age: 79
DRG: 193 | End: 2024-02-24
Payer: MEDICARE

## 2024-02-24 ENCOUNTER — APPOINTMENT (OUTPATIENT)
Dept: CT IMAGING | Age: 79
DRG: 193 | End: 2024-02-24
Payer: MEDICARE

## 2024-02-24 ENCOUNTER — HOSPITAL ENCOUNTER (INPATIENT)
Age: 79
LOS: 3 days | Discharge: HOME HEALTH | DRG: 193 | End: 2024-02-27
Attending: EMERGENCY MEDICINE | Admitting: INTERNAL MEDICINE
Payer: MEDICARE

## 2024-02-24 DIAGNOSIS — J96.01 ACUTE HYPOXIC RESPIRATORY FAILURE (HCC): Primary | ICD-10-CM

## 2024-02-24 LAB
ALBUMIN SERPL-MCNC: 3.8 G/DL (ref 3.5–5.2)
ALP SERPL-CCNC: 72 U/L (ref 40–129)
ALT SERPL-CCNC: <5 U/L (ref 0–40)
ANION GAP SERPL CALCULATED.3IONS-SCNC: 9 MMOL/L (ref 7–16)
AST SERPL-CCNC: 11 U/L (ref 0–39)
BASOPHILS # BLD: 0.03 K/UL (ref 0–0.2)
BASOPHILS NFR BLD: 1 % (ref 0–2)
BILIRUB SERPL-MCNC: 0.8 MG/DL (ref 0–1.2)
BILIRUB UR QL STRIP: NEGATIVE
BNP SERPL-MCNC: 429 PG/ML (ref 0–450)
BUN SERPL-MCNC: 18 MG/DL (ref 6–23)
CALCIUM SERPL-MCNC: 9.1 MG/DL (ref 8.6–10.2)
CHLORIDE SERPL-SCNC: 107 MMOL/L (ref 98–107)
CLARITY UR: CLEAR
CO2 SERPL-SCNC: 24 MMOL/L (ref 22–29)
COLOR UR: YELLOW
CREAT SERPL-MCNC: 1.1 MG/DL (ref 0.7–1.2)
EOSINOPHIL # BLD: 0.05 K/UL (ref 0.05–0.5)
EOSINOPHILS RELATIVE PERCENT: 1 % (ref 0–6)
ERYTHROCYTE [DISTWIDTH] IN BLOOD BY AUTOMATED COUNT: 12.6 % (ref 11.5–15)
GFR SERPL CREATININE-BSD FRML MDRD: >60 ML/MIN/1.73M2
GLUCOSE BLD-MCNC: 120 MG/DL (ref 74–99)
GLUCOSE BLD-MCNC: 142 MG/DL (ref 74–99)
GLUCOSE SERPL-MCNC: 173 MG/DL (ref 74–99)
GLUCOSE UR STRIP-MCNC: NEGATIVE MG/DL
HCT VFR BLD AUTO: 44.4 % (ref 37–54)
HGB BLD-MCNC: 14.6 G/DL (ref 12.5–16.5)
HGB UR QL STRIP.AUTO: ABNORMAL
IMM GRANULOCYTES # BLD AUTO: 0.03 K/UL (ref 0–0.58)
IMM GRANULOCYTES NFR BLD: 1 % (ref 0–5)
INFLUENZA A BY PCR: NOT DETECTED
INFLUENZA B BY PCR: NOT DETECTED
KETONES UR STRIP-MCNC: NEGATIVE MG/DL
LACTATE BLDV-SCNC: 1.4 MMOL/L (ref 0.5–1.9)
LEUKOCYTE ESTERASE UR QL STRIP: NEGATIVE
LYMPHOCYTES NFR BLD: 0.83 K/UL (ref 1.5–4)
LYMPHOCYTES RELATIVE PERCENT: 18 % (ref 20–42)
MCH RBC QN AUTO: 32.3 PG (ref 26–35)
MCHC RBC AUTO-ENTMCNC: 32.9 G/DL (ref 32–34.5)
MCV RBC AUTO: 98.2 FL (ref 80–99.9)
MONOCYTES NFR BLD: 0.39 K/UL (ref 0.1–0.95)
MONOCYTES NFR BLD: 9 % (ref 2–12)
NEUTROPHILS NFR BLD: 71 % (ref 43–80)
NEUTS SEG NFR BLD: 3.24 K/UL (ref 1.8–7.3)
NITRITE UR QL STRIP: NEGATIVE
PH UR STRIP: 6.5 [PH] (ref 5–9)
PLATELET, FLUORESCENCE: 135 K/UL (ref 130–450)
PMV BLD AUTO: 9.6 FL (ref 7–12)
POTASSIUM SERPL-SCNC: 4.2 MMOL/L (ref 3.5–5)
PROT SERPL-MCNC: 6.4 G/DL (ref 6.4–8.3)
PROT UR STRIP-MCNC: NEGATIVE MG/DL
RBC # BLD AUTO: 4.52 M/UL (ref 3.8–5.8)
RBC #/AREA URNS HPF: ABNORMAL /HPF
SARS-COV-2 RDRP RESP QL NAA+PROBE: NOT DETECTED
SODIUM SERPL-SCNC: 140 MMOL/L (ref 132–146)
SP GR UR STRIP: 1.02 (ref 1–1.03)
SPECIMEN DESCRIPTION: NORMAL
TROPONIN I SERPL HS-MCNC: 20 NG/L (ref 0–11)
TROPONIN I SERPL HS-MCNC: 21 NG/L (ref 0–11)
UROBILINOGEN UR STRIP-ACNC: 4 EU/DL (ref 0–1)
WBC #/AREA URNS HPF: ABNORMAL /HPF
WBC OTHER # BLD: 4.6 K/UL (ref 4.5–11.5)

## 2024-02-24 PROCEDURE — 1200000000 HC SEMI PRIVATE

## 2024-02-24 PROCEDURE — 85025 COMPLETE CBC W/AUTO DIFF WBC: CPT

## 2024-02-24 PROCEDURE — 71275 CT ANGIOGRAPHY CHEST: CPT

## 2024-02-24 PROCEDURE — 99285 EMERGENCY DEPT VISIT HI MDM: CPT

## 2024-02-24 PROCEDURE — 74177 CT ABD & PELVIS W/CONTRAST: CPT

## 2024-02-24 PROCEDURE — 6370000000 HC RX 637 (ALT 250 FOR IP): Performed by: INTERNAL MEDICINE

## 2024-02-24 PROCEDURE — 2580000003 HC RX 258: Performed by: INTERNAL MEDICINE

## 2024-02-24 PROCEDURE — 6370000000 HC RX 637 (ALT 250 FOR IP): Performed by: NURSE PRACTITIONER

## 2024-02-24 PROCEDURE — 93005 ELECTROCARDIOGRAM TRACING: CPT

## 2024-02-24 PROCEDURE — 81001 URINALYSIS AUTO W/SCOPE: CPT

## 2024-02-24 PROCEDURE — 6360000002 HC RX W HCPCS: Performed by: INTERNAL MEDICINE

## 2024-02-24 PROCEDURE — 87635 SARS-COV-2 COVID-19 AMP PRB: CPT

## 2024-02-24 PROCEDURE — 2500000003 HC RX 250 WO HCPCS

## 2024-02-24 PROCEDURE — 71045 X-RAY EXAM CHEST 1 VIEW: CPT

## 2024-02-24 PROCEDURE — 6360000004 HC RX CONTRAST MEDICATION: Performed by: RADIOLOGY

## 2024-02-24 PROCEDURE — 6360000002 HC RX W HCPCS

## 2024-02-24 PROCEDURE — 87502 INFLUENZA DNA AMP PROBE: CPT

## 2024-02-24 PROCEDURE — 2580000003 HC RX 258

## 2024-02-24 PROCEDURE — 87086 URINE CULTURE/COLONY COUNT: CPT

## 2024-02-24 PROCEDURE — 96374 THER/PROPH/DIAG INJ IV PUSH: CPT

## 2024-02-24 PROCEDURE — 80053 COMPREHEN METABOLIC PANEL: CPT

## 2024-02-24 PROCEDURE — 82962 GLUCOSE BLOOD TEST: CPT

## 2024-02-24 PROCEDURE — 83880 ASSAY OF NATRIURETIC PEPTIDE: CPT

## 2024-02-24 PROCEDURE — 83036 HEMOGLOBIN GLYCOSYLATED A1C: CPT

## 2024-02-24 PROCEDURE — 96361 HYDRATE IV INFUSION ADD-ON: CPT

## 2024-02-24 PROCEDURE — 83605 ASSAY OF LACTIC ACID: CPT

## 2024-02-24 PROCEDURE — 84484 ASSAY OF TROPONIN QUANT: CPT

## 2024-02-24 PROCEDURE — 87040 BLOOD CULTURE FOR BACTERIA: CPT

## 2024-02-24 RX ORDER — ACETAMINOPHEN 325 MG/1
650 TABLET ORAL EVERY 6 HOURS PRN
Status: DISCONTINUED | OUTPATIENT
Start: 2024-02-24 | End: 2024-02-27 | Stop reason: HOSPADM

## 2024-02-24 RX ORDER — RASAGILINE 1 MG/1
1 TABLET ORAL DAILY
Status: DISCONTINUED | OUTPATIENT
Start: 2024-02-25 | End: 2024-02-27 | Stop reason: HOSPADM

## 2024-02-24 RX ORDER — ONDANSETRON 4 MG/1
4 TABLET, ORALLY DISINTEGRATING ORAL EVERY 8 HOURS PRN
Status: DISCONTINUED | OUTPATIENT
Start: 2024-02-24 | End: 2024-02-24 | Stop reason: CLARIF

## 2024-02-24 RX ORDER — DIAZEPAM 2 MG/1
2 TABLET ORAL EVERY 6 HOURS PRN
Status: DISCONTINUED | OUTPATIENT
Start: 2024-02-24 | End: 2024-02-27 | Stop reason: HOSPADM

## 2024-02-24 RX ORDER — LISINOPRIL 10 MG/1
10 TABLET ORAL DAILY
Status: DISCONTINUED | OUTPATIENT
Start: 2024-02-24 | End: 2024-02-27 | Stop reason: HOSPADM

## 2024-02-24 RX ORDER — GABAPENTIN 300 MG/1
600 CAPSULE ORAL 3 TIMES DAILY
Status: DISCONTINUED | OUTPATIENT
Start: 2024-02-24 | End: 2024-02-27 | Stop reason: HOSPADM

## 2024-02-24 RX ORDER — SODIUM CHLORIDE 0.9 % (FLUSH) 0.9 %
10 SYRINGE (ML) INJECTION PRN
Status: DISCONTINUED | OUTPATIENT
Start: 2024-02-24 | End: 2024-02-27 | Stop reason: HOSPADM

## 2024-02-24 RX ORDER — SODIUM CHLORIDE 9 MG/ML
INJECTION, SOLUTION INTRAVENOUS PRN
Status: DISCONTINUED | OUTPATIENT
Start: 2024-02-24 | End: 2024-02-27 | Stop reason: HOSPADM

## 2024-02-24 RX ORDER — POTASSIUM CHLORIDE 20 MEQ/1
40 TABLET, EXTENDED RELEASE ORAL PRN
Status: DISCONTINUED | OUTPATIENT
Start: 2024-02-24 | End: 2024-02-27 | Stop reason: HOSPADM

## 2024-02-24 RX ORDER — ASPIRIN 81 MG/1
81 TABLET ORAL DAILY
Status: DISCONTINUED | OUTPATIENT
Start: 2024-02-24 | End: 2024-02-27 | Stop reason: HOSPADM

## 2024-02-24 RX ORDER — ACETAMINOPHEN 650 MG/1
650 SUPPOSITORY RECTAL EVERY 6 HOURS PRN
Status: DISCONTINUED | OUTPATIENT
Start: 2024-02-24 | End: 2024-02-27 | Stop reason: HOSPADM

## 2024-02-24 RX ORDER — SODIUM CHLORIDE 0.9 % (FLUSH) 0.9 %
10 SYRINGE (ML) INJECTION EVERY 12 HOURS SCHEDULED
Status: DISCONTINUED | OUTPATIENT
Start: 2024-02-24 | End: 2024-02-27 | Stop reason: HOSPADM

## 2024-02-24 RX ORDER — INSULIN LISPRO 100 [IU]/ML
0-8 INJECTION, SOLUTION INTRAVENOUS; SUBCUTANEOUS
Status: DISCONTINUED | OUTPATIENT
Start: 2024-02-24 | End: 2024-02-27 | Stop reason: HOSPADM

## 2024-02-24 RX ORDER — APIXABAN 5 MG/1
5 TABLET, FILM COATED ORAL EVERY 12 HOURS
COMMUNITY

## 2024-02-24 RX ORDER — PROCHLORPERAZINE EDISYLATE 5 MG/ML
10 INJECTION INTRAMUSCULAR; INTRAVENOUS EVERY 6 HOURS PRN
Status: DISCONTINUED | OUTPATIENT
Start: 2024-02-24 | End: 2024-02-27 | Stop reason: HOSPADM

## 2024-02-24 RX ORDER — ENOXAPARIN SODIUM 100 MG/ML
40 INJECTION SUBCUTANEOUS DAILY
Status: DISCONTINUED | OUTPATIENT
Start: 2024-02-24 | End: 2024-02-24

## 2024-02-24 RX ORDER — 0.9 % SODIUM CHLORIDE 0.9 %
1000 INTRAVENOUS SOLUTION INTRAVENOUS ONCE
Status: COMPLETED | OUTPATIENT
Start: 2024-02-24 | End: 2024-02-24

## 2024-02-24 RX ORDER — DEXTROSE MONOHYDRATE 100 MG/ML
INJECTION, SOLUTION INTRAVENOUS CONTINUOUS PRN
Status: DISCONTINUED | OUTPATIENT
Start: 2024-02-24 | End: 2024-02-27 | Stop reason: HOSPADM

## 2024-02-24 RX ORDER — INSULIN LISPRO 100 [IU]/ML
0-4 INJECTION, SOLUTION INTRAVENOUS; SUBCUTANEOUS NIGHTLY
Status: DISCONTINUED | OUTPATIENT
Start: 2024-02-24 | End: 2024-02-27 | Stop reason: HOSPADM

## 2024-02-24 RX ORDER — CARBIDOPA AND LEVODOPA 25; 100 MG/1; MG/1
2 TABLET, ORALLY DISINTEGRATING ORAL 4 TIMES DAILY
Status: DISCONTINUED | OUTPATIENT
Start: 2024-02-24 | End: 2024-02-24

## 2024-02-24 RX ORDER — POTASSIUM CHLORIDE 7.45 MG/ML
10 INJECTION INTRAVENOUS PRN
Status: DISCONTINUED | OUTPATIENT
Start: 2024-02-24 | End: 2024-02-27 | Stop reason: HOSPADM

## 2024-02-24 RX ORDER — HYDRALAZINE HYDROCHLORIDE 25 MG/1
25 TABLET, FILM COATED ORAL EVERY 8 HOURS PRN
Status: DISCONTINUED | OUTPATIENT
Start: 2024-02-24 | End: 2024-02-27 | Stop reason: HOSPADM

## 2024-02-24 RX ORDER — GLUCAGON 1 MG/ML
1 KIT INJECTION PRN
Status: DISCONTINUED | OUTPATIENT
Start: 2024-02-24 | End: 2024-02-27 | Stop reason: HOSPADM

## 2024-02-24 RX ORDER — SENNOSIDES A AND B 8.6 MG/1
1 TABLET, FILM COATED ORAL DAILY PRN
Status: DISCONTINUED | OUTPATIENT
Start: 2024-02-24 | End: 2024-02-27 | Stop reason: HOSPADM

## 2024-02-24 RX ORDER — PROCHLORPERAZINE MALEATE 10 MG
10 TABLET ORAL EVERY 8 HOURS PRN
Status: DISCONTINUED | OUTPATIENT
Start: 2024-02-24 | End: 2024-02-27 | Stop reason: HOSPADM

## 2024-02-24 RX ORDER — ONDANSETRON 2 MG/ML
4 INJECTION INTRAMUSCULAR; INTRAVENOUS EVERY 6 HOURS PRN
Status: DISCONTINUED | OUTPATIENT
Start: 2024-02-24 | End: 2024-02-24 | Stop reason: CLARIF

## 2024-02-24 RX ADMIN — LISINOPRIL 10 MG: 10 TABLET ORAL at 17:48

## 2024-02-24 RX ADMIN — SODIUM CHLORIDE 1000 ML: 9 INJECTION, SOLUTION INTRAVENOUS at 10:22

## 2024-02-24 RX ADMIN — ENOXAPARIN SODIUM 40 MG: 100 INJECTION SUBCUTANEOUS at 16:16

## 2024-02-24 RX ADMIN — DOXYCYCLINE 100 MG: 100 INJECTION, POWDER, LYOPHILIZED, FOR SOLUTION INTRAVENOUS at 14:23

## 2024-02-24 RX ADMIN — SODIUM CHLORIDE, PRESERVATIVE FREE 10 ML: 5 INJECTION INTRAVENOUS at 22:12

## 2024-02-24 RX ADMIN — APIXABAN 5 MG: 5 TABLET, FILM COATED ORAL at 21:54

## 2024-02-24 RX ADMIN — ASPIRIN 81 MG: 81 TABLET, COATED ORAL at 16:16

## 2024-02-24 RX ADMIN — GABAPENTIN 600 MG: 300 CAPSULE ORAL at 16:16

## 2024-02-24 RX ADMIN — IOPAMIDOL 75 ML: 755 INJECTION, SOLUTION INTRAVENOUS at 12:29

## 2024-02-24 RX ADMIN — GABAPENTIN 600 MG: 300 CAPSULE ORAL at 21:54

## 2024-02-24 RX ADMIN — CARBIDOPA AND LEVODOPA 1 TABLET: 25; 100 TABLET ORAL at 22:14

## 2024-02-24 RX ADMIN — AMPICILLIN SODIUM AND SULBACTAM SODIUM 3000 MG: 2; 1 INJECTION, POWDER, FOR SOLUTION INTRAMUSCULAR; INTRAVENOUS at 13:23

## 2024-02-24 NOTE — ED NOTES
ED to Inpatient Handoff Report    Notified Gerardo that electronic handoff available and patient ready for transport to room 539.    Safety Risks: Risk of falls    Patient in Restraints: no    Constant Observer or Patient : no    Telemetry Monitoring Ordered :No           Order to transfer to unit without monitor:N/A    Last MEWS:  Time completed: 1433Dylan    Deterioration Index Score:   Predictive Model Details          23 (Normal)  Factor Value    Calculated 2/24/2024 14:32 61% Age 79 years old    Deterioration Index Model 19% Respiratory rate 19     7% Systolic 138     6% Potassium 4.2 mmol/L     4% Pulse 92     2% Pulse oximetry 94 %     1% WBC count 4.6 k/uL     1% Sodium 140 mmol/L     0% Temperature 97.9 °F (36.6 °C)     0% Hematocrit 44.4 %        Vitals:    02/24/24 1032 02/24/24 1307 02/24/24 1324 02/24/24 1424   BP:   (!) 183/94 138/86   Pulse: 92 80 81 92   Resp: 16 17 20 19   Temp:    97.9 °F (36.6 °C)   TempSrc:    Oral   SpO2:  98% 98% 94%   Weight:       Height:             Opportunity for questions and clarification was provided.

## 2024-02-24 NOTE — ED PROVIDER NOTES
[facility]/TELE  EMERGENCY DEPARTMENT ENCOUNTER        Pt Name: Frankie Hernández  MRN: [id number]  Birthdate 1945  Date of evaluation: 2/24/2024  Provider: Coleman Méndez MD  PCP: Minor Cagle MD  Note Started: 9:28 AM EST 2/24/24    CHIEF COMPLAINT       Chief Complaint   Patient presents with    Dysuria     Feels like bladder is full, urinating frequent small amts       HISTORY OF PRESENT ILLNESS: 1 or more Elements   History From: Patient    Limitations to history : None  Social Determinants : None    Frankie Hernández is a 79 y.o. male with a history of DVT, PE, renal carcinoma, CKD, Parkinson's, IVC filter who presents with painful urination and sensation of full bladder.    Denies any fever, chills, nausea, vomiting, headache, dizziness, vision changes, neck tenderness or stiffness, weakness, chest pain, palpitations, leg swelling/tenderness, sob, cough, abdominal pain, dysuria, hematuria, diarrhea, constipation, bloody stools.    He was hypoxic on arrival to the ED and was placed on oxygen via NC.     Nursing Notes were all reviewed and agreed with or any disagreements were addressed in the HPI.    ROS:   Pertinent positives and negatives are stated within HPI, all other systems reviewed and are negative.      --------------------------------------------- PAST HISTORY ---------------------------------------------  Past Medical History:       Diagnosis Date    Acute left lumbar radiculopathy 03/21/2021    YUDY (acute kidney injury) ([facility]) 11/29/2013    Anxiety and depression     Cancer ([facility]CC)     Cerebral atrophy ([facility])     Disc displacement, lumbar     History of pulmonary embolus (PE) 03/20/2021    Hx of deep venous thrombosis 2013    Kidney disease     Nerve injury     Parkinson disease (HCC) 11/29/2013    Pneumonia     Pulmonary embolism (HCC) 11/29/2013    Renal carcinoma (HCC) 11/29/2013    right    Rhinovirus infection 03/20/2021    3/2021    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

## 2024-02-25 LAB
ALBUMIN SERPL-MCNC: 3.8 G/DL (ref 3.5–5.2)
ALP SERPL-CCNC: 72 U/L (ref 40–129)
ALT SERPL-CCNC: <5 U/L (ref 0–40)
ANION GAP SERPL CALCULATED.3IONS-SCNC: 10 MMOL/L (ref 7–16)
AST SERPL-CCNC: 16 U/L (ref 0–39)
BASOPHILS # BLD: 0.03 K/UL (ref 0–0.2)
BASOPHILS NFR BLD: 1 % (ref 0–2)
BILIRUB SERPL-MCNC: 0.9 MG/DL (ref 0–1.2)
BUN SERPL-MCNC: 14 MG/DL (ref 6–23)
CALCIUM SERPL-MCNC: 9.1 MG/DL (ref 8.6–10.2)
CHLORIDE SERPL-SCNC: 105 MMOL/L (ref 98–107)
CO2 SERPL-SCNC: 22 MMOL/L (ref 22–29)
CREAT SERPL-MCNC: 1.1 MG/DL (ref 0.7–1.2)
EOSINOPHIL # BLD: 0.05 K/UL (ref 0.05–0.5)
EOSINOPHILS RELATIVE PERCENT: 1 % (ref 0–6)
ERYTHROCYTE [DISTWIDTH] IN BLOOD BY AUTOMATED COUNT: 12.6 % (ref 11.5–15)
GFR SERPL CREATININE-BSD FRML MDRD: >60 ML/MIN/1.73M2
GLUCOSE BLD-MCNC: 104 MG/DL (ref 74–99)
GLUCOSE BLD-MCNC: 130 MG/DL (ref 74–99)
GLUCOSE BLD-MCNC: 188 MG/DL (ref 74–99)
GLUCOSE BLD-MCNC: 213 MG/DL (ref 74–99)
GLUCOSE SERPL-MCNC: 195 MG/DL (ref 74–99)
HBA1C MFR BLD: 6.6 % (ref 4–5.6)
HCT VFR BLD AUTO: 44.5 % (ref 37–54)
HGB BLD-MCNC: 14.5 G/DL (ref 12.5–16.5)
IMM GRANULOCYTES # BLD AUTO: <0.03 K/UL (ref 0–0.58)
IMM GRANULOCYTES NFR BLD: 0 % (ref 0–5)
LYMPHOCYTES NFR BLD: 0.92 K/UL (ref 1.5–4)
LYMPHOCYTES RELATIVE PERCENT: 22 % (ref 20–42)
MCH RBC QN AUTO: 31.7 PG (ref 26–35)
MCHC RBC AUTO-ENTMCNC: 32.6 G/DL (ref 32–34.5)
MCV RBC AUTO: 97.2 FL (ref 80–99.9)
MICROORGANISM SPEC CULT: NO GROWTH
MONOCYTES NFR BLD: 0.34 K/UL (ref 0.1–0.95)
MONOCYTES NFR BLD: 8 % (ref 2–12)
NEUTROPHILS NFR BLD: 68 % (ref 43–80)
NEUTS SEG NFR BLD: 2.89 K/UL (ref 1.8–7.3)
PLATELET # BLD AUTO: 125 K/UL (ref 130–450)
PMV BLD AUTO: 9.8 FL (ref 7–12)
POTASSIUM SERPL-SCNC: 4.2 MMOL/L (ref 3.5–5)
PROT SERPL-MCNC: 6.3 G/DL (ref 6.4–8.3)
RBC # BLD AUTO: 4.58 M/UL (ref 3.8–5.8)
SODIUM SERPL-SCNC: 137 MMOL/L (ref 132–146)
SPECIMEN DESCRIPTION: NORMAL
WBC OTHER # BLD: 4.2 K/UL (ref 4.5–11.5)

## 2024-02-25 PROCEDURE — 36415 COLL VENOUS BLD VENIPUNCTURE: CPT

## 2024-02-25 PROCEDURE — 2500000003 HC RX 250 WO HCPCS: Performed by: INTERNAL MEDICINE

## 2024-02-25 PROCEDURE — 80053 COMPREHEN METABOLIC PANEL: CPT

## 2024-02-25 PROCEDURE — 1200000000 HC SEMI PRIVATE

## 2024-02-25 PROCEDURE — 2700000000 HC OXYGEN THERAPY PER DAY

## 2024-02-25 PROCEDURE — 85025 COMPLETE CBC W/AUTO DIFF WBC: CPT

## 2024-02-25 PROCEDURE — 6370000000 HC RX 637 (ALT 250 FOR IP): Performed by: NURSE PRACTITIONER

## 2024-02-25 PROCEDURE — 6370000000 HC RX 637 (ALT 250 FOR IP): Performed by: INTERNAL MEDICINE

## 2024-02-25 PROCEDURE — 82962 GLUCOSE BLOOD TEST: CPT

## 2024-02-25 PROCEDURE — 2580000003 HC RX 258: Performed by: INTERNAL MEDICINE

## 2024-02-25 RX ORDER — POLYETHYLENE GLYCOL 3350 17 G/17G
17 POWDER, FOR SOLUTION ORAL EVERY MORNING
Status: DISCONTINUED | OUTPATIENT
Start: 2024-02-26 | End: 2024-02-27 | Stop reason: HOSPADM

## 2024-02-25 RX ADMIN — ACETAMINOPHEN 650 MG: 325 TABLET ORAL at 05:41

## 2024-02-25 RX ADMIN — CARBIDOPA AND LEVODOPA 1 TABLET: 25; 100 TABLET ORAL at 21:47

## 2024-02-25 RX ADMIN — GABAPENTIN 600 MG: 300 CAPSULE ORAL at 22:10

## 2024-02-25 RX ADMIN — SODIUM CHLORIDE, PRESERVATIVE FREE 10 ML: 5 INJECTION INTRAVENOUS at 08:39

## 2024-02-25 RX ADMIN — RASAGILINE 1 MG: 1 TABLET ORAL at 08:38

## 2024-02-25 RX ADMIN — DOXYCYCLINE 100 MG: 100 INJECTION, POWDER, LYOPHILIZED, FOR SOLUTION INTRAVENOUS at 11:58

## 2024-02-25 RX ADMIN — GABAPENTIN 600 MG: 300 CAPSULE ORAL at 08:38

## 2024-02-25 RX ADMIN — LISINOPRIL 10 MG: 10 TABLET ORAL at 08:38

## 2024-02-25 RX ADMIN — SODIUM CHLORIDE: 9 INJECTION, SOLUTION INTRAVENOUS at 11:57

## 2024-02-25 RX ADMIN — APIXABAN 5 MG: 5 TABLET, FILM COATED ORAL at 22:10

## 2024-02-25 RX ADMIN — GABAPENTIN 600 MG: 300 CAPSULE ORAL at 14:31

## 2024-02-25 RX ADMIN — CARBIDOPA AND LEVODOPA 1 TABLET: 25; 100 TABLET ORAL at 12:15

## 2024-02-25 RX ADMIN — ASPIRIN 81 MG: 81 TABLET, COATED ORAL at 08:38

## 2024-02-25 RX ADMIN — ACETAMINOPHEN 650 MG: 325 TABLET ORAL at 16:18

## 2024-02-25 RX ADMIN — CARBIDOPA AND LEVODOPA 2 TABLET: 25; 100 TABLET ORAL at 16:12

## 2024-02-25 RX ADMIN — APIXABAN 5 MG: 5 TABLET, FILM COATED ORAL at 08:38

## 2024-02-25 RX ADMIN — SODIUM CHLORIDE, PRESERVATIVE FREE 10 ML: 5 INJECTION INTRAVENOUS at 22:09

## 2024-02-25 RX ADMIN — CARBIDOPA AND LEVODOPA 2 TABLET: 25; 100 TABLET ORAL at 08:38

## 2024-02-25 ASSESSMENT — PAIN DESCRIPTION - LOCATION
LOCATION: ARM;OTHER (COMMENT)
LOCATION: BACK

## 2024-02-25 ASSESSMENT — PAIN DESCRIPTION - DESCRIPTORS: DESCRIPTORS: DISCOMFORT

## 2024-02-25 ASSESSMENT — PAIN SCALES - GENERAL
PAINLEVEL_OUTOF10: 7
PAINLEVEL_OUTOF10: 10

## 2024-02-25 ASSESSMENT — PAIN DESCRIPTION - ORIENTATION: ORIENTATION: LEFT

## 2024-02-25 NOTE — PROGRESS NOTES
Spoke to daughter, the pt uses a cane but daughter believes he needs a walker, the pt is said to have a knee replacement in march. Daughter would like pt/ot piteer.   Contacted Dr Colin and will place order

## 2024-02-25 NOTE — H&P
Internal Medicine History & Physical     Chief Complaint: Dysuria (Feels like bladder is full, urinating frequent small amts)  Reason for Admission: acute hypoxic respiratory failure  Primary Care Physician: Minor Cagle MD  Code status:full    History of Present Illness  Frankie is a 79 y.o. 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     The patient presented to the emergency room last night with dysuria difficulty in urination and also was found to be hypoxic.  In the emergency room he was unable to urinate and required catheterization.  There was no evidence of urinary tract infection.  CT chest there was potential evidence of pneumonia versus aspiration pneumonia.  He does have a known history of cancer and is being followed with [facility] for monitoring of lymphadenopathy.    Today his dysuria has resolved.  Hypoxic still, at 3LNC today.  States he is breathing well.    Wife at bedside.  Was due for 6 month CT scan for cancer surveillance.    Therapy in ED-   Medications   aspirin EC tablet 81 mg (81 mg Oral Given 2/24/24 1616)   diazePAM (VALIUM) tablet 2 mg (has no administration in time range)   gabapentin (NEURONTIN) capsule 600 mg (600 mg Oral Given 2/24/24 2154)   rasagiline mesylate TABS 1 mg (has no administration in time range)   insulin lispro (HUMALOG) injection vial 0-8 Units ( SubCUTAneous Not Given 2/24/24 1713)   insulin lispro (HUMALOG) injection vial 0-4 Units ( SubCUTAneous Not Given 2/24/24 2215)   dextrose bolus 10% 125 mL (has no administration in time range)     Or  Reported on 2/24/2024    Jon Elizabeth MD   aspirin 81 MG tablet Take 81 mg by mouth daily 1200  Patient not taking: Reported on 2/24/2024    Jon Elizabeth MD   glipiZIDE (GLUCOTROL) 5 MG tablet Take 0.5 tablets by mouth nightly 1500 & 2100    Jon Elizabeth MD   rasagiline mesylate 1 MG TABS Take 1 tablet by mouth daily 0800    Jon Elizabeth MD   carbidopa-levodopa (PARCOPA)  MG TBDP Take by mouth 4 times daily Takes 200 mg at 0800  100mg at 1200  200mg at 1600  100mg 2000    Jon Elizabeth MD       Allergies  Allergies   Allergen Reactions    Tape [Adhesive Tape]        Review of Systems  Please see HPI above. All bolded are positive. All un-bolded are negative.  Gen: fever, chills, fatigue, generalized weakness, diaphoresis, increase in thirst, unintentional weight change, loss of appetite  Head: headache, vision change, hearing loss  Chest: chest pain, chest heaviness, palpitations  Lungs: shortness of breath, wheezing, coughing  Abdomen: abdominal pain, nausea, vomiting, diarrhea, constipation, melena, hematochezia, hematemesis  Back: back pain  Extremities: lower extremity edema, myalgias, arthralgias  Urinary: dysuria, hematuria, or increase in frequency  Neurologic: lightheadedness, dizziness, confusion, syncope, numbness, tingling, weakness  Psychiatric: depression, suicidal ideation, or anxiety    Objective  BP (!) 167/74   Pulse 71   Temp 97.5 °F (36.4 °C)   Resp 17   Ht 1.829 m (6')   Wt 97.5 kg (215 lb)   SpO2 98%   BMI 29.16 kg/m²     Physical Exam:  General: awake, alert, oriented to person, place, time, and purpose, appears stated age, cooperative, no acute distress, pleasant   Head: normocephalic, atraumatic  Eyes: conjunctivae/corneas clear, sclera non icteric  Mouth: mucous membranes moist, no obvious oral sores  Neck: no JVD, no adenopathy, no carotid bruit, neck is supple, trachea is midline  Back: ROM normal, no CVA tenderness.  Lungs: clear to

## 2024-02-26 PROBLEM — J18.9 CAP (COMMUNITY ACQUIRED PNEUMONIA): Status: ACTIVE | Noted: 2024-02-26

## 2024-02-26 LAB
ALBUMIN SERPL-MCNC: 3.7 G/DL (ref 3.5–5.2)
ALP SERPL-CCNC: 68 U/L (ref 40–129)
ALT SERPL-CCNC: <5 U/L (ref 0–40)
ANION GAP SERPL CALCULATED.3IONS-SCNC: 6 MMOL/L (ref 7–16)
AST SERPL-CCNC: 13 U/L (ref 0–39)
BASOPHILS # BLD: 0.04 K/UL (ref 0–0.2)
BASOPHILS NFR BLD: 1 % (ref 0–2)
BILIRUB SERPL-MCNC: 0.8 MG/DL (ref 0–1.2)
BUN SERPL-MCNC: 15 MG/DL (ref 6–23)
CALCIUM SERPL-MCNC: 9 MG/DL (ref 8.6–10.2)
CHLORIDE SERPL-SCNC: 108 MMOL/L (ref 98–107)
CO2 SERPL-SCNC: 24 MMOL/L (ref 22–29)
CREAT SERPL-MCNC: 1.1 MG/DL (ref 0.7–1.2)
EKG ATRIAL RATE: 101 BPM
EKG P AXIS: 44 DEGREES
EKG P-R INTERVAL: 178 MS
EKG Q-T INTERVAL: 398 MS
EKG QRS DURATION: 144 MS
EKG QTC CALCULATION (BAZETT): 516 MS
EKG R AXIS: -40 DEGREES
EKG VENTRICULAR RATE: 101 BPM
EOSINOPHIL # BLD: 0.09 K/UL (ref 0.05–0.5)
EOSINOPHILS RELATIVE PERCENT: 2 % (ref 0–6)
ERYTHROCYTE [DISTWIDTH] IN BLOOD BY AUTOMATED COUNT: 12.4 % (ref 11.5–15)
GFR SERPL CREATININE-BSD FRML MDRD: >60 ML/MIN/1.73M2
GLUCOSE BLD-MCNC: 114 MG/DL (ref 74–99)
GLUCOSE BLD-MCNC: 182 MG/DL (ref 74–99)
GLUCOSE BLD-MCNC: 206 MG/DL (ref 74–99)
GLUCOSE BLD-MCNC: 228 MG/DL (ref 74–99)
GLUCOSE SERPL-MCNC: 126 MG/DL (ref 74–99)
HCT VFR BLD AUTO: 42.9 % (ref 37–54)
HGB BLD-MCNC: 13.9 G/DL (ref 12.5–16.5)
IMM GRANULOCYTES # BLD AUTO: <0.03 K/UL (ref 0–0.58)
IMM GRANULOCYTES NFR BLD: 1 % (ref 0–5)
LYMPHOCYTES NFR BLD: 1.06 K/UL (ref 1.5–4)
LYMPHOCYTES RELATIVE PERCENT: 25 % (ref 20–42)
MCH RBC QN AUTO: 32 PG (ref 26–35)
MCHC RBC AUTO-ENTMCNC: 32.4 G/DL (ref 32–34.5)
MCV RBC AUTO: 98.8 FL (ref 80–99.9)
MONOCYTES NFR BLD: 0.48 K/UL (ref 0.1–0.95)
MONOCYTES NFR BLD: 11 % (ref 2–12)
NEUTROPHILS NFR BLD: 60 % (ref 43–80)
NEUTS SEG NFR BLD: 2.56 K/UL (ref 1.8–7.3)
PLATELET # BLD AUTO: 119 K/UL (ref 130–450)
PMV BLD AUTO: 9.8 FL (ref 7–12)
POTASSIUM SERPL-SCNC: 4.2 MMOL/L (ref 3.5–5)
PROT SERPL-MCNC: 6.1 G/DL (ref 6.4–8.3)
RBC # BLD AUTO: 4.34 M/UL (ref 3.8–5.8)
SODIUM SERPL-SCNC: 138 MMOL/L (ref 132–146)
WBC OTHER # BLD: 4.3 K/UL (ref 4.5–11.5)

## 2024-02-26 PROCEDURE — 80053 COMPREHEN METABOLIC PANEL: CPT

## 2024-02-26 PROCEDURE — 6370000000 HC RX 637 (ALT 250 FOR IP): Performed by: INTERNAL MEDICINE

## 2024-02-26 PROCEDURE — 93010 ELECTROCARDIOGRAM REPORT: CPT | Performed by: INTERNAL MEDICINE

## 2024-02-26 PROCEDURE — 2700000000 HC OXYGEN THERAPY PER DAY

## 2024-02-26 PROCEDURE — 97530 THERAPEUTIC ACTIVITIES: CPT

## 2024-02-26 PROCEDURE — 85025 COMPLETE CBC W/AUTO DIFF WBC: CPT

## 2024-02-26 PROCEDURE — 82962 GLUCOSE BLOOD TEST: CPT

## 2024-02-26 PROCEDURE — 1200000000 HC SEMI PRIVATE

## 2024-02-26 PROCEDURE — 97161 PT EVAL LOW COMPLEX 20 MIN: CPT

## 2024-02-26 PROCEDURE — 6370000000 HC RX 637 (ALT 250 FOR IP): Performed by: NURSE PRACTITIONER

## 2024-02-26 PROCEDURE — 2580000003 HC RX 258: Performed by: INTERNAL MEDICINE

## 2024-02-26 PROCEDURE — 6360000002 HC RX W HCPCS: Performed by: NURSE PRACTITIONER

## 2024-02-26 PROCEDURE — 2500000003 HC RX 250 WO HCPCS: Performed by: INTERNAL MEDICINE

## 2024-02-26 PROCEDURE — 2580000003 HC RX 258: Performed by: NURSE PRACTITIONER

## 2024-02-26 PROCEDURE — 97165 OT EVAL LOW COMPLEX 30 MIN: CPT

## 2024-02-26 RX ORDER — GLIPIZIDE 5 MG/1
5 TABLET ORAL NIGHTLY
Status: DISCONTINUED | OUTPATIENT
Start: 2024-02-26 | End: 2024-02-27 | Stop reason: HOSPADM

## 2024-02-26 RX ADMIN — GABAPENTIN 600 MG: 300 CAPSULE ORAL at 14:45

## 2024-02-26 RX ADMIN — DOXYCYCLINE 100 MG: 100 INJECTION, POWDER, LYOPHILIZED, FOR SOLUTION INTRAVENOUS at 02:01

## 2024-02-26 RX ADMIN — RASAGILINE 1 MG: 1 TABLET ORAL at 08:36

## 2024-02-26 RX ADMIN — SODIUM CHLORIDE, PRESERVATIVE FREE 10 ML: 5 INJECTION INTRAVENOUS at 08:36

## 2024-02-26 RX ADMIN — CARBIDOPA AND LEVODOPA 2 TABLET: 25; 100 TABLET ORAL at 15:58

## 2024-02-26 RX ADMIN — LISINOPRIL 10 MG: 10 TABLET ORAL at 08:36

## 2024-02-26 RX ADMIN — CARBIDOPA AND LEVODOPA 2 TABLET: 25; 100 TABLET ORAL at 08:36

## 2024-02-26 RX ADMIN — APIXABAN 5 MG: 5 TABLET, FILM COATED ORAL at 20:39

## 2024-02-26 RX ADMIN — POLYETHYLENE GLYCOL 3350 17 G: 17 POWDER, FOR SOLUTION ORAL at 08:35

## 2024-02-26 RX ADMIN — CARBIDOPA AND LEVODOPA 1 TABLET: 25; 100 TABLET ORAL at 20:42

## 2024-02-26 RX ADMIN — GABAPENTIN 600 MG: 300 CAPSULE ORAL at 20:41

## 2024-02-26 RX ADMIN — WATER 1000 MG: 1 INJECTION INTRAMUSCULAR; INTRAVENOUS; SUBCUTANEOUS at 11:20

## 2024-02-26 RX ADMIN — ACETAMINOPHEN 650 MG: 325 TABLET ORAL at 02:00

## 2024-02-26 RX ADMIN — APIXABAN 5 MG: 5 TABLET, FILM COATED ORAL at 08:36

## 2024-02-26 RX ADMIN — ASPIRIN 81 MG: 81 TABLET, COATED ORAL at 08:36

## 2024-02-26 RX ADMIN — GABAPENTIN 600 MG: 300 CAPSULE ORAL at 08:36

## 2024-02-26 RX ADMIN — SODIUM CHLORIDE, PRESERVATIVE FREE 10 ML: 5 INJECTION INTRAVENOUS at 20:41

## 2024-02-26 RX ADMIN — ACETAMINOPHEN 650 MG: 325 TABLET ORAL at 20:47

## 2024-02-26 RX ADMIN — CARBIDOPA AND LEVODOPA 1 TABLET: 25; 100 TABLET ORAL at 11:20

## 2024-02-26 RX ADMIN — INSULIN LISPRO 2 UNITS: 100 INJECTION, SOLUTION INTRAVENOUS; SUBCUTANEOUS at 11:23

## 2024-02-26 RX ADMIN — GLIPIZIDE 5 MG: 5 TABLET ORAL at 20:40

## 2024-02-26 RX ADMIN — DOXYCYCLINE 100 MG: 100 INJECTION, POWDER, LYOPHILIZED, FOR SOLUTION INTRAVENOUS at 14:45

## 2024-02-26 ASSESSMENT — PAIN DESCRIPTION - ORIENTATION: ORIENTATION: LOWER

## 2024-02-26 ASSESSMENT — PAIN SCALES - GENERAL
PAINLEVEL_OUTOF10: 6
PAINLEVEL_OUTOF10: 0
PAINLEVEL_OUTOF10: 10

## 2024-02-26 ASSESSMENT — PAIN DESCRIPTION - LOCATION: LOCATION: BACK

## 2024-02-26 ASSESSMENT — PAIN DESCRIPTION - DESCRIPTORS: DESCRIPTORS: ACHING;DULL

## 2024-02-26 NOTE — PROGRESS NOTES
Physical Therapy  Facility/Department: [address] MED SURG/TELE  Physical Therapy Initial Assessment    Name: Frankie Hernández  : 1945  MRN: [id number]  Date of Service: 2024    Attending Provider:  Ridge Doyle DO    Evaluating PT:  Dave Skelton Jr., P.T.    Room #:  [address] [place] [address]A  Diagnosis:  Acute respiratory failure with hypoxia (HCC) [J96.01]  Acute hypoxic respiratory failure (HCC) [J96.01]  Pertinent PMHx/PSHx:  Parkinson's disease, L knee OA and is scheduled for TKA end of 2024  Precautions:  falls, bed/chair alarm    SUBJECTIVE:    Pt lives with his wife in a 1 floor condo with no stairs to enter.  Pt ambulated with a quad cane, but used a rollator ww in the evening and when he goes outside to the store.  He has 2 rollator ww and a standard ww    OBJECTIVE:   Initial Evaluation  Date: 24 Treatment Short Term/ Long Term   Goals   Was pt agreeable to Eval/treatment? yes     Does pt have pain? No c/o pain     Bed Mobility  Rolling: supervision  Supine to sit: supervision  Sit to supine: NA  Scooting: supervision  Independent    Transfers Sit to stand: SBA  Stand to sit: SBA  Stand pivot: CGA with ww  SBA   Ambulation   500 feet with ww CGA  500 feet with ww SBA   Stair negotiation: ascended and descended NA  NA   AM-PAC 6 Clicks        BLE ROM is WFL.   BLE strength is grossly 4/5 to 4+/5.   Sensation:  Pt denies numbness and tingling to extremities  Balance: sitting is supervision and standing with ww is SBA/CGA  Endurance: fair+    Patient education  Pt educated on turning all the way around with ww before sitting down in chair.     Patient response to education:   Pt verbalized understanding Pt demonstrated skill Pt requires further education in this area   yes yes yes     ASSESSMENT:    Conditions Requiring Skilled Therapeutic Intervention:    [x]Decreased strength     []Decreased ROM  [x]Decreased functional mobility  [x]Decreased balance   [x]Decreased endurance

## 2024-02-26 NOTE — PROGRESS NOTES
Patient pox room air 94%  Pox room air ambulating  93%.  Patient ambulated approx 100 feet with front wheeled walker, pox 93%.  At rest returned to 94%.

## 2024-02-26 NOTE — PROGRESS NOTES
OCCUPATIONAL THERAPY INITIAL EVALUATION    [facility]   [address] [facility]        Date:2024                                                  Patient Name: Frankie Hernández    MRN: [id number]    : 1945    Room: [address] [place] [address]     Evaluating OT: Conchita Garcia OTR/L #[id number]    Referring Provider:  Dasia Colin MD     Specific Provider Orders/Date:  OT Eval and Treat , 2024     Diagnosis:   1. Acute hypoxic respiratory failure (HCC)        Surgery: None      Pertinent Medical History: Parkinson's disease, L knee OA, PE, anxiety/depression, back surgery, IVC filter      Precautions:  Fall Risk, alarm, O2 - new user      Assessment of current deficits    [x] Functional mobility  [x]ADLs  [x] Strength               []Cognition    [x] Functional transfers   [x] IADLs         [x] Safety Awareness   [x]Endurance    [] Fine Coordination              [x] Balance      [] Vision/perception   []Sensation     []Gross Motor Coordination  [] ROM  [] Delirium                   [] Motor Control     OT PLAN OF CARE   OT POC based on physician orders, patient diagnosis and results of clinical assessment    Frequency/Duration 1-3 days/wk for 2 weeks PRN     Specific OT Treatment Interventions to include:   * Instruction/training on adapted ADL techniques and AE recommendations to increase functional independence within precautions       * Training on energy conservation strategies, correct breathing pattern and techniques to improve independence/tolerance for self-care routine  * Functional transfer/mobility training/DME recommendations for increased independence, safety, and fall prevention  * Patient/Family education to increase follow through with safety techniques and functional independence  * Recommendation of environmental modifications for increased safety with functional transfers/mobility and ADLs  * Cognitive retraining/development of    Commode Transfer: Stand by Assist with use of grab bar     Transfer training with verbal cues for hand placement to improve safety.     Independent   Functional Mobility CGA with ww to improve balance to/from bathroom, verbal cues for walker sequence and safety.     Modified Gibson with use of ww   Balance Sitting:     Static: good    Dynamic: good  Standing: fair plus with ww    Sitting:     Static: good    Dynamic: good  Standing: good with ww   Activity Tolerance fair  plus     SpO2 90-93% on 2L, HR stable. No c/o SOB.   Increase standing tolerance >3  minutes for improved engagement with functional transfers and indep in ADLs     Visual/  Perceptual N/A     NA      Hand Dominance:      AROM (PROM) Strength Additional Info:  Goal:   RUE  WFL 4-/5 good  and wfl FMC/dexterity noted during ADL tasks   Improve overall RUE strength  for participation in functional tasks   LUE WFL 4-/5 good  and wfl FMC/dexterity noted during ADL tasks   Improve overall LUE strength  for participation in functional tasks     Hearing:  WFL   Sensation:   No c/o numbness or tingling  Tone:  WFL   Edema: None     Comments: RN cleared patient for OT.   Upon arrival patient in supine.  Therapist facilitated and instructed pt on adapted  techniques & compensatory strategies to improve safety and independence with basic ADLs, bed mobility, functional transfers and mobility to allow pt to achieve highest level of independence and safely.  Pt demonstrated fair plus understanding of education & follow through.  At end of session, patient was in supine, alarm on, wife in room with call light and phone within reach, all lines and tubes intact.  Overall, patient demonstrated  decreased independence and safety during completion of ADL tasks.  Pt would benefit from continued skilled OT to increase safety and independence with completion of ADL tasks and functional mobility for improved quality of life.       Rehab Potential: Good for

## 2024-02-26 NOTE — PROGRESS NOTES
Internal Medicine Progress Note    Patient's name: Frankie Hernández  : 1945  Chief complaints (on day of admission): Dysuria (Feels like bladder is full, urinating frequent small amts)  Admission date: 2024  Date of service: 2024   Room: [address] MED SURG/TELE  Primary care physician: Minor Cagle MD  Reason for visit: Follow-up for hypoxia, pneumonia    Subjective  Frankie is seen lying in bed awake and alert in no distress.  He reports feeling well at this time, asking when he can go home.  He denies any shortness of breath or difficulty breathing.  Reports that his back is a little sore this morning related to the bed.  He denies any fever or chills.  Denies any nausea or vomiting.  Wife is present at the bedside and is asking about discharge planning and if he can get up with therapy today as she does not want him in bed all day.  No other issues or concerns from nursing.    Review of Systems  Full 10 point review of systems negative unless mentioned above.    Hospital Medications  Current Facility-Administered Medications   Medication Dose Route Frequency Provider Last Rate Last Admin    cefTRIAXone (ROCEPHIN) 1,000 mg in sterile water 10 mL IV syringe  1,000 mg IntraVENous Q24H Stephanie Connor APRN - CNP   1,000 mg at 24 1120    doxycycline (VIBRAMYCIN) 100 mg in sodium chloride 0.9 % 100 mL IVPB  100 mg IntraVENous Q12H Jefferson, Dasia SANTOS MD   Stopped at 24 0339    polyethylene glycol (GLYCOLAX) packet 17 g  17 g Oral QAM Jefferson, Dasia SANTOS MD   17 g at 24 0835    aspirin EC tablet 81 mg  81 mg Oral Daily Jefferson, Dasia SANTOS MD   81 mg at 24 0836    diazePAM (VALIUM) tablet 2 mg  2 mg Oral Q6H PRN JeffersonDasia MD        gabapentin (NEURONTIN) capsule 600 mg  600 mg Oral TID Jefferson, Dasia SANTOS MD   600 mg at 24 0836    rasagiline mesylate TABS 1 mg  1 tablet Oral Daily Jefferson, Dasia SANTOS MD   1 mg at 24 0836    insulin lispro (HUMALOG) injection vial 0-8 Units  0-8  able  Continue antibiotics  Hopefully he can go home tomorrow off of oxygen    Electronically signed by Ridge Doyle DO on 2/26/2024 at 12:31 PM    I can be reached through [employer].

## 2024-02-26 NOTE — CARE COORDINATION
Met with pt spouse at bedside; pt from home with spouse; PCP agata anderson.pt has ww/rollator/ cane at home. Currently on 3lnc (new) wife agreeable to have [employer] follow. Referral made,pt has hx with [facility]; requests them again; referral made. Orders on chart. Iv rocephin/ iv doxy. Will follow clinical course. Plan is home; wife can transport. PT/OT saw. WALTER not required. Angeline Norris RN,CM.    Per alonso Crowell accepts at discharge. Orders on chart. Angeline Norris RN,CM.

## 2024-02-26 NOTE — ACP (ADVANCE CARE PLANNING)
2/26/2024  Advance Care Planning   Healthcare Decision Maker:    Primary Decision Maker: Nina Hernández - [facility] - [phone]    Click here to complete Healthcare Decision Makers including selection of the Healthcare Decision Maker Relationship (ie \"Primary\").

## 2024-02-27 VITALS
SYSTOLIC BLOOD PRESSURE: 168 MMHG | BODY MASS INDEX: 29.84 KG/M2 | DIASTOLIC BLOOD PRESSURE: 85 MMHG | HEIGHT: 72 IN | RESPIRATION RATE: 16 BRPM | TEMPERATURE: 98 F | OXYGEN SATURATION: 92 % | HEART RATE: 74 BPM | WEIGHT: 220.3 LBS

## 2024-02-27 LAB
ALBUMIN SERPL-MCNC: 3.7 G/DL (ref 3.5–5.2)
ALP SERPL-CCNC: 67 U/L (ref 40–129)
ALT SERPL-CCNC: <5 U/L (ref 0–40)
ANION GAP SERPL CALCULATED.3IONS-SCNC: 8 MMOL/L (ref 7–16)
AST SERPL-CCNC: 15 U/L (ref 0–39)
BASOPHILS # BLD: 0.04 K/UL (ref 0–0.2)
BASOPHILS NFR BLD: 1 % (ref 0–2)
BILIRUB SERPL-MCNC: 0.6 MG/DL (ref 0–1.2)
BUN SERPL-MCNC: 17 MG/DL (ref 6–23)
CALCIUM SERPL-MCNC: 9 MG/DL (ref 8.6–10.2)
CHLORIDE SERPL-SCNC: 109 MMOL/L (ref 98–107)
CO2 SERPL-SCNC: 24 MMOL/L (ref 22–29)
CREAT SERPL-MCNC: 1.2 MG/DL (ref 0.7–1.2)
EOSINOPHIL # BLD: 0.1 K/UL (ref 0.05–0.5)
EOSINOPHILS RELATIVE PERCENT: 2 % (ref 0–6)
ERYTHROCYTE [DISTWIDTH] IN BLOOD BY AUTOMATED COUNT: 12.6 % (ref 11.5–15)
GFR SERPL CREATININE-BSD FRML MDRD: >60 ML/MIN/1.73M2
GLUCOSE BLD-MCNC: 204 MG/DL (ref 74–99)
GLUCOSE BLD-MCNC: 95 MG/DL (ref 74–99)
GLUCOSE SERPL-MCNC: 96 MG/DL (ref 74–99)
HCT VFR BLD AUTO: 42.9 % (ref 37–54)
HGB BLD-MCNC: 13.7 G/DL (ref 12.5–16.5)
IMM GRANULOCYTES # BLD AUTO: 0.03 K/UL (ref 0–0.58)
IMM GRANULOCYTES NFR BLD: 1 % (ref 0–5)
LYMPHOCYTES NFR BLD: 1.09 K/UL (ref 1.5–4)
LYMPHOCYTES RELATIVE PERCENT: 24 % (ref 20–42)
MCH RBC QN AUTO: 31.4 PG (ref 26–35)
MCHC RBC AUTO-ENTMCNC: 31.9 G/DL (ref 32–34.5)
MCV RBC AUTO: 98.2 FL (ref 80–99.9)
MONOCYTES NFR BLD: 0.54 K/UL (ref 0.1–0.95)
MONOCYTES NFR BLD: 12 % (ref 2–12)
NEUTROPHILS NFR BLD: 60 % (ref 43–80)
NEUTS SEG NFR BLD: 2.67 K/UL (ref 1.8–7.3)
PLATELET, FLUORESCENCE: 136 K/UL (ref 130–450)
PMV BLD AUTO: 9.5 FL (ref 7–12)
POTASSIUM SERPL-SCNC: 4.2 MMOL/L (ref 3.5–5)
PROT SERPL-MCNC: 5.9 G/DL (ref 6.4–8.3)
RBC # BLD AUTO: 4.37 M/UL (ref 3.8–5.8)
SODIUM SERPL-SCNC: 141 MMOL/L (ref 132–146)
WBC OTHER # BLD: 4.5 K/UL (ref 4.5–11.5)

## 2024-02-27 PROCEDURE — 6360000002 HC RX W HCPCS: Performed by: NURSE PRACTITIONER

## 2024-02-27 PROCEDURE — 85025 COMPLETE CBC W/AUTO DIFF WBC: CPT

## 2024-02-27 PROCEDURE — 6370000000 HC RX 637 (ALT 250 FOR IP): Performed by: NURSE PRACTITIONER

## 2024-02-27 PROCEDURE — 2500000003 HC RX 250 WO HCPCS: Performed by: INTERNAL MEDICINE

## 2024-02-27 PROCEDURE — 80053 COMPREHEN METABOLIC PANEL: CPT

## 2024-02-27 PROCEDURE — 97530 THERAPEUTIC ACTIVITIES: CPT

## 2024-02-27 PROCEDURE — 2580000003 HC RX 258: Performed by: INTERNAL MEDICINE

## 2024-02-27 PROCEDURE — 6370000000 HC RX 637 (ALT 250 FOR IP): Performed by: INTERNAL MEDICINE

## 2024-02-27 PROCEDURE — 82962 GLUCOSE BLOOD TEST: CPT

## 2024-02-27 PROCEDURE — 36415 COLL VENOUS BLD VENIPUNCTURE: CPT

## 2024-02-27 PROCEDURE — 2580000003 HC RX 258: Performed by: NURSE PRACTITIONER

## 2024-02-27 RX ORDER — CEFDINIR 300 MG/1
300 CAPSULE ORAL 2 TIMES DAILY
Qty: 14 CAPSULE | Refills: 0 | Status: SHIPPED | OUTPATIENT
Start: 2024-02-27 | End: 2024-03-05

## 2024-02-27 RX ORDER — LISINOPRIL 20 MG/1
20 TABLET ORAL DAILY
Qty: 30 TABLET | Refills: 0 | Status: SHIPPED | OUTPATIENT
Start: 2024-02-28

## 2024-02-27 RX ORDER — DOXYCYCLINE HYCLATE 100 MG/1
100 CAPSULE ORAL EVERY 12 HOURS SCHEDULED
Status: DISCONTINUED | OUTPATIENT
Start: 2024-02-27 | End: 2024-02-27 | Stop reason: HOSPADM

## 2024-02-27 RX ORDER — DOXYCYCLINE HYCLATE 100 MG
100 TABLET ORAL 2 TIMES DAILY
Qty: 14 TABLET | Refills: 0 | Status: SHIPPED | OUTPATIENT
Start: 2024-02-27 | End: 2024-03-05

## 2024-02-27 RX ADMIN — POLYETHYLENE GLYCOL 3350 17 G: 17 POWDER, FOR SOLUTION ORAL at 08:48

## 2024-02-27 RX ADMIN — APIXABAN 5 MG: 5 TABLET, FILM COATED ORAL at 08:46

## 2024-02-27 RX ADMIN — ACETAMINOPHEN 650 MG: 325 TABLET ORAL at 02:27

## 2024-02-27 RX ADMIN — LISINOPRIL 10 MG: 10 TABLET ORAL at 08:45

## 2024-02-27 RX ADMIN — GABAPENTIN 600 MG: 300 CAPSULE ORAL at 08:46

## 2024-02-27 RX ADMIN — DOXYCYCLINE 100 MG: 100 INJECTION, POWDER, LYOPHILIZED, FOR SOLUTION INTRAVENOUS at 02:24

## 2024-02-27 RX ADMIN — CARBIDOPA AND LEVODOPA 2 TABLET: 25; 100 TABLET ORAL at 08:47

## 2024-02-27 RX ADMIN — INSULIN LISPRO 2 UNITS: 100 INJECTION, SOLUTION INTRAVENOUS; SUBCUTANEOUS at 12:09

## 2024-02-27 RX ADMIN — ASPIRIN 81 MG: 81 TABLET, COATED ORAL at 08:45

## 2024-02-27 RX ADMIN — WATER 1000 MG: 1 INJECTION INTRAMUSCULAR; INTRAVENOUS; SUBCUTANEOUS at 08:48

## 2024-02-27 RX ADMIN — RASAGILINE 1 MG: 1 TABLET ORAL at 08:46

## 2024-02-27 RX ADMIN — SODIUM CHLORIDE, PRESERVATIVE FREE 10 ML: 5 INJECTION INTRAVENOUS at 08:55

## 2024-02-27 RX ADMIN — CARBIDOPA AND LEVODOPA 1 TABLET: 25; 100 TABLET ORAL at 12:09

## 2024-02-27 ASSESSMENT — PAIN SCALES - GENERAL
PAINLEVEL_OUTOF10: 3
PAINLEVEL_OUTOF10: 0
PAINLEVEL_OUTOF10: 0

## 2024-02-27 NOTE — DISCHARGE SUMMARY
is clear but soft    LABS::  Recent Labs     02/26/24 0325 02/27/24 0411    141   K 4.2 4.2   * 109*   CO2 24 24   BUN 15 17   CREATININE 1.1 1.2   GLUCOSE 126* 96   CALCIUM 9.0 9.0     Recent Labs     02/26/24 0325 02/27/24 0411   ALKPHOS 68 67   ALT <5 <5   AST 13 15   PROT 6.1* 5.9*   BILITOT 0.8 0.6   LABALBU 3.7 3.7     Recent Labs     02/26/24 0325 02/27/24 0411   WBC 4.3* 4.5   RBC 4.34 4.37   HGB 13.9 13.7   HCT 42.9 42.9   MCV 98.8 98.2   MCH 32.0 31.4   MCHC 32.4 31.9*   RDW 12.4 12.6   *  --    MPV 9.8 9.5     Lab Results   Component Value Date    LABA1C 6.6 (H) 02/24/2024    LABA1C 6.7 (H) 12/22/2021    LABA1C 6.0 (H) 03/20/2021     Lab Results   Component Value Date    INR 1.3 11/16/2022    INR 1.1 12/22/2021    INR 1.0 12/21/2021    PROTIME 14.6 (H) 11/16/2022    PROTIME 11.9 12/22/2021    PROTIME 10.6 12/21/2021      Lab Results   Component Value Date    TSH 0.817 12/22/2021    TSH 1.600 03/20/2021    TSH 1.540 04/23/2019     Lab Results   Component Value Date    TRIG 59 12/22/2021    TRIG 69 03/20/2021    HDL 50 12/22/2021    HDL 51 03/20/2021    LDLCALC 42 12/22/2021    LDLCALC 114 (H) 03/20/2021     No results for input(s): \"MG\" in the last 72 hours.    No results for input(s): \"CKTOTAL\", \"CKMB\", \"TROPONINI\" in the last 72 hours.   No results for input(s): \"LACTA\" in the last 72 hours.    IMAGING:  CT ABDOMEN PELVIS W IV CONTRAST Additional Contrast? None    Result Date: 2/24/2024  EXAMINATION: 2/24/2024 11:58 am COMPARISON: CT abdomen and pelvis dated 11/15/2023 HISTORY: ORDERING SYSTEM PROVIDED HISTORY: Decreased urine output, abdominal pain TECHNOLOGIST PROVIDED HISTORY: Reason for exam:->Decreased urine output, abdominal pain Additional Contrast?->None Decision Support Exception - unselect if not a suspected or confirmed emergency medical condition->Emergency Medical Condition (MA) FINDINGS: Unless otherwise indicated or stated incidental findings do not require  imaging.     CTA PULMONARY W CONTRAST    Result Date: 2/24/2024  EXAMINATION: CTA OF THE CHEST 2/24/2024 11:58 am TECHNIQUE: CTA of the chest was performed after the administration of intravenous contrast.  Multiplanar reformatted images are provided for review.  MIP images are provided for review. Automated exposure control, iterative reconstruction, and/or weight based adjustment of the mA/kV was utilized to reduce the radiation dose to as low as reasonably achievable. COMPARISON: CT of the chest of 11/16/2022. HISTORY: ORDERING SYSTEM PROVIDED HISTORY: Hypoxia TECHNOLOGIST PROVIDED HISTORY: Reason for exam:->Hypoxia Additional Contrast?->1 FINDINGS: Pulmonary Arteries: Pulmonary arteries are adequately opacified for evaluation.  No evidence of intraluminal filling defect to suggest pulmonary embolism.  Main pulmonary artery is normal in caliber. Mediastinum: There are multiple partially calcified lymph nodes seen within the mediastinum, subcarinal region and tiffanie.  These findings are consistent with benign calcified granulomatous disease.  These findings were present on the patient's prior study of 11/16/2022..  The heart and pericardium demonstrate no acute abnormality.  There is no acute abnormality of the thoracic aorta. Lungs/pleura: There is patchy airspace disease seen within the right and left lower lobes posteriorly.  These findings can represent a combination of both pneumonia and atelectasis.  There is no right or left pleural effusion. Upper Abdomen: Limited images of the upper abdomen are unremarkable. Soft Tissues/Bones: No acute bone or soft tissue abnormality.     1. There is no pulmonary embolus 2. Findings of calcified granulomatous disease.  The mediastinal, hilar and subcarinal lymphadenopathy is unchanged compared to prior study of 11/16/2022 3. Patchy airspace disease seen within the right and left lower lobes more prominent posteriorly.  These findings definitely represent a component of

## 2024-02-27 NOTE — PROGRESS NOTES
OCCUPATIONAL THERAPY TREATMENT NOTE  [facility]   [address] [facility]    Date:2024  Patient Name: Frankie Hernández  MRN: [id number]  : 1945  Room: [address] [place] [address]      Evaluating OT: Conchita Garcia OTR/L #[id number]     Referring Provider:  Dasia Colin MD      Specific Provider Orders/Date:  OT Eval and Treat , 2024      Diagnosis:   1. Acute hypoxic respiratory failure (HCC)        Surgery: None       Pertinent Medical History: Parkinson's disease, L knee OA, PE, anxiety/depression, back surgery, IVC filter       Precautions:  Fall Risk,        Assessment of current deficits    [x] Functional mobility            [x]ADLs           [x] Strength                   []Cognition    [x] Functional transfers          [x] IADLs          [x] Safety Awareness   [x]Endurance    [] Fine Coordination              [x] Balance      [] Vision/perception    []Sensation      []Gross Motor Coordination  [] ROM           [] Delirium                   [] Motor Control      OT PLAN OF CARE   OT POC based on physician orders, patient diagnosis and results of clinical assessment     Frequency/Duration 1-3 days/wk for 2 weeks PRN      Specific OT Treatment Interventions to include:   * Instruction/training on adapted ADL techniques and AE recommendations to increase functional independence within precautions       * Training on energy conservation strategies, correct breathing pattern and techniques to improve independence/tolerance for self-care routine  * Functional transfer/mobility training/DME recommendations for increased independence, safety, and fall prevention  * Patient/Family education to increase follow through with safety techniques and functional independence  * Recommendation of environmental modifications for increased safety with functional transfers/mobility and ADLs  * Cognitive retraining/development of therapeutic activities to improve

## 2024-02-27 NOTE — PROGRESS NOTES
Written and verbal discharge instructions reviewed, voiced understanding. Discharged off the unit via wheelchair with staff to car.

## 2024-02-27 NOTE — PLAN OF CARE
Problem: Discharge Planning  Goal: Discharge to home or other facility with appropriate resources  2/27/2024 1100 by Aisha Bello RN  Outcome: Adequate for Discharge  2/27/2024 0213 by Beckie Gardner RN  Outcome: Progressing  Flowsheets (Taken 2/27/2024 0209)  Discharge to home or other facility with appropriate resources:   Identify barriers to discharge with patient and caregiver   Arrange for needed discharge resources and transportation as appropriate   Identify discharge learning needs (meds, wound care, etc)   Refer to discharge planning if patient needs post-hospital services based on physician order or complex needs related to functional status, cognitive ability or social support system     Problem: Safety - Adult  Goal: Free from fall injury  2/27/2024 1100 by Aisha Bello RN  Outcome: Adequate for Discharge  2/27/2024 0213 by Beckie Gardner RN  Outcome: Progressing     Problem: Pain  Goal: Verbalizes/displays adequate comfort level or baseline comfort level  2/27/2024 1100 by Aisha Bello RN  Outcome: Adequate for Discharge  2/27/2024 0213 by Beckie Gardner RN  Outcome: Progressing     Problem: Skin/Tissue Integrity  Goal: Absence of new skin breakdown  2/27/2024 1100 by Aisha Bello RN  Outcome: Adequate for Discharge  2/27/2024 0213 by Beckie Gardner RN  Outcome: Progressing

## 2024-02-27 NOTE — PROGRESS NOTES
Internal Medicine Progress Note    Patient's name: Frankie Hernández  : 1945  Chief complaints (on day of admission): Dysuria (Feels like bladder is full, urinating frequent small amts)  Admission date: 2024  Date of service: 2024   Room: [address] MED SURG/TELE  Primary care physician: Minor Cagle MD  Reason for visit: Follow-up for hypoxia, pneumonia    Subjective  Frankie is seen lying in bed asleep in no distress.  He does easily awaken to voice.  He reports feeling well at this time.  He denies any shortness of breath or difficulty breathing.  He reports that he is no longer on the oxygen, was taken off yesterday.  He denies any fever or chills.  He does report that his back hurts from the bed.  He is asking if he is going home today.  Wife is present at the bedside and reports that he did do fairly well with therapy yesterday and oxygen was weaned off late in the afternoon.  No other issues or concerns from nursing.    Review of Systems  Full 10 point review of systems negative unless mentioned above.    Hospital Medications  Current Facility-Administered Medications   Medication Dose Route Frequency Provider Last Rate Last Admin    cefTRIAXone (ROCEPHIN) 1,000 mg in sterile water 10 mL IV syringe  1,000 mg IntraVENous Q24H Stephanie Connor APRN - CNP   1,000 mg at 24 1120    glipiZIDE (GLUCOTROL) tablet 5 mg  5 mg Oral Nightly Stephanie Connor APRN - CNP   5 mg at 24    doxycycline (VIBRAMYCIN) 100 mg in sodium chloride 0.9 % 100 mL IVPB  100 mg IntraVENous Q12H ConstableDasia MD   Stopped at 24 0330    polyethylene glycol (GLYCOLAX) packet 17 g  17 g Oral QAM Constable, Dasia SANTOS MD   17 g at 24 0835    aspirin EC tablet 81 mg  81 mg Oral Daily Constable, Dasia SANTOS MD   81 mg at 24 0836    diazePAM (VALIUM) tablet 2 mg  2 mg Oral Q6H PRN ConstableDasia MD        gabapentin (NEURONTIN) capsule 600 mg  600 mg Oral TID Constable, Dasia SANTOS MD   600 mg at 24  [G31.9]     History of pulmonary embolus (PE) [Z86.711]     Parkinson disease [G20.A1]     Renal carcinoma (HCC) [C64.9]     CKD (chronic kidney disease) stage 3, GFR 30-59 ml/min [N18.30]     Anxiety and depression [F41.9, F32.A]     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]     Hx of deep venous thrombosis [Z86.718]          Plan  Acute hypoxic respiratory failure in the setting of pneumonia  CTA chest noted  Potential community-acquired versus aspiration  Continue IV Rocephin/Doxycycline -- will switch to oral on discharge  Encourage IS and increased activity given imaging also suggestive of atelectasis  Continue supplemental oxygen and wean to maintain SpO2 >90% -- currently back on room air with stable oxygenation     Dysuria  Reviewed UA negative for UTI  Does have history of renal carcinoma  Currently denies any complaints     Diabetes Mellitus 2  A1c 6.6%  Home Glipizide resumed  Continue SSI and monitor BS      Parkinson's Disease  Continue Sinemet  PT/OT, increase activity  PT AM-PAC -- 18/24    Known Renal Carcinoma  Imaging from admission noted  Patient follows with [facility] as outpatient    Follow labs   DVT prophylaxis  Please see orders for further management and care.  Discharge plan: home later today    The pertinent details of this case were discussed with Dr. Doyle.    Electronically signed by LIDIA Ibarra CNP on 2/27/2024 at 8:42 AM      Addendum: I have personally participated in a face-to-face history and physical exam on the date of service with the patient. I have discussed the case with the nurse practitioner. I also participated in medical decision making on the date of service and I agree with all of the pertinent clinical information unless indicated in my editing of the note. I have reviewed and edited the note above based on my findings during my history, exam, and decision making on the same day of service.     My additional thoughts:   He was resting very

## 2024-02-27 NOTE — PLAN OF CARE
Problem: Discharge Planning  Goal: Discharge to home or other facility with appropriate resources  Outcome: Progressing  Flowsheets (Taken 2/27/2024 0209)  Discharge to home or other facility with appropriate resources:   Identify barriers to discharge with patient and caregiver   Arrange for needed discharge resources and transportation as appropriate   Identify discharge learning needs (meds, wound care, etc)   Refer to discharge planning if patient needs post-hospital services based on physician order or complex needs related to functional status, cognitive ability or social support system     Problem: Safety - Adult  Goal: Free from fall injury  2/27/2024 0213 by Beckie Gardner RN  Outcome: Progressing  2/26/2024 1423 by Aisha Bello RN  Outcome: Progressing     Problem: Pain  Goal: Verbalizes/displays adequate comfort level or baseline comfort level  2/27/2024 0213 by Beckie Gardner RN  Outcome: Progressing  2/26/2024 1423 by Aisha Bello RN  Outcome: Progressing     Problem: Skin/Tissue Integrity  Goal: Absence of new skin breakdown  Description: 1.  Monitor for areas of redness and/or skin breakdown  2.  Assess vascular access sites hourly  3.  Every 4-6 hours minimum:  Change oxygen saturation probe site  4.  Every 4-6 hours:  If on nasal continuous positive airway pressure, respiratory therapy assess nares and determine need for appliance change or resting period.  Outcome: Progressing

## 2024-02-27 NOTE — CARE COORDINATION
Iv rocephin; currently on 3lnc (new) [employer] following. Will need POX testing prior to discharge. [facility] to see post discharge. Orders on chart. Plan is home with [facility] . Family can transport.will need pox testing prior to discharge. Angeline Norris RN,CM.

## 2024-02-27 NOTE — PROGRESS NOTES
SPIRITUAL HEALTH SERVICES - PAT [facility] Encounter    Name: Frankie Hernández                  Referral: Routine Visit    Sacraments  Anointed (Last Rites): No  [facility] [place]: No  Confession: No  Communion: No     Assessment:  Was already discharged      Intervention:  None.     Outcome:  None.    Plan:  None.      Electronically signed by Chaplain Corazon, on 2/27/2024 at 5:32 PM.  Spiritual Care Department  [facility]  [phone]

## 2024-02-29 LAB
MICROORGANISM SPEC CULT: NORMAL
MICROORGANISM SPEC CULT: NORMAL
SERVICE CMNT-IMP: NORMAL
SERVICE CMNT-IMP: NORMAL
SPECIMEN DESCRIPTION: NORMAL
SPECIMEN DESCRIPTION: NORMAL

## 2024-03-05 ENCOUNTER — OFFICE VISIT (OUTPATIENT)
Dept: VASCULAR SURGERY | Age: 79
End: 2024-03-05
Payer: MEDICARE

## 2024-03-05 VITALS — WEIGHT: 215 LBS | BODY MASS INDEX: 29.16 KG/M2

## 2024-03-05 DIAGNOSIS — Z86.718 HX OF DEEP VENOUS THROMBOSIS: Primary | ICD-10-CM

## 2024-03-05 PROCEDURE — 1123F ACP DISCUSS/DSCN MKR DOCD: CPT | Performed by: SURGERY

## 2024-03-05 PROCEDURE — 99204 OFFICE O/P NEW MOD 45 MIN: CPT | Performed by: SURGERY

## 2024-03-05 RX ORDER — ATORVASTATIN CALCIUM 10 MG/1
10 TABLET, FILM COATED ORAL DAILY
COMMUNITY

## 2024-03-05 NOTE — PROGRESS NOTES
Vascular Surgery Outpatient Consultation      Chief Complaint   Patient presents with    Consultation     New pt.IVC Filter        Reason for Consult: Vena cava filter    Requesting Physician:  Dr. Trujillo    HISTORY OF PRESENT ILLNESS:                The patient is a 79 y.o. male who is referred for evaluation of vena cava filter insertion.  The patient is accompanied by his wife.  He is scheduled for left total knee replacement on March 29.  The patient has a history of renal cell carcinoma and right nephrectomy.  He had a DVT in the past and PE and a filter was inserted and removed around the time of the surgery in 2014.  He has been maintained on Eliquis and denies any new events.  He is felt to be at high risk for subsequent development of DVT and potentially PE while off anticoagulation.    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 Authorizing Provider   atorvastatin (LIPITOR) 10 MG tablet Take 1 tablet by mouth daily   Yes Provider, MD Jon

## 2024-03-06 NOTE — PROGRESS NOTES
Physician Progress Note      PATIENT:               CLEVE SALAZAR  CSN #:                  [id number]  :                       1945  ADMIT DATE:       2024 9:22 AM  DISCH DATE:        2024 12:36 PM  RESPONDING  PROVIDER #:        Ridge Doyle DO          QUERY TEXT:    Pt admitted with pneumonia.  Pt noted to have documentation of CAP versus   aspiration. If possible, please document in the progress notes and discharge   summary if you are evaluating and/or treating any of the following:    Note: CAP and HCAP indicate where the pneumonia was acquired, not a specific   type.    The medical record reflects the following:  Risk Factors: Parkinson's, h/o renal carcinoma  Clinical Indicators: H&P, PN's and DC summary \"Acute hypoxic respiratory   failure in the setting of pneumonia. Potential community-acquired versus   aspiration.\"  Was treated with IV Rocephin and Docycyline, discharged home on Cefdinir and   Doxycycline po  Treatment: IV Rocephin and Docycyline, discharged home on Cefdinir and   Doxycycline po    Thank you,  Eliz Cabral RN, CCDS  Clinical Documentation   [email or web address]  [phone]  (M-F 6am-2:30 pm)  Options provided:  -- Gram negative pneumonia  -- Gram positive pneumonia  -- Other - I will add my own diagnosis  -- Disagree - Not applicable / Not valid  -- Disagree - Clinically unable to determine / Unknown  -- Refer to Clinical Documentation Reviewer    PROVIDER RESPONSE TEXT:    This patient has gram positive pneumonia.    Query created by: Eliz Cabral on 3/1/2024 1:27 PM      Electronically signed by:  Ridge Doyle DO 3/6/2024 6:20 AM

## 2024-03-20 ENCOUNTER — HOSPITAL ENCOUNTER (OUTPATIENT)
Age: 79
Setting detail: OUTPATIENT SURGERY
Discharge: HOME | End: 2024-03-20
Attending: SURGERY | Admitting: SURGERY
Payer: MEDICARE

## 2024-03-20 VITALS
OXYGEN SATURATION: 96 % | HEIGHT: 72 IN | BODY MASS INDEX: 29.12 KG/M2 | TEMPERATURE: 97.1 F | WEIGHT: 215 LBS | HEART RATE: 90 BPM | RESPIRATION RATE: 17 BRPM | SYSTOLIC BLOOD PRESSURE: 190 MMHG | DIASTOLIC BLOOD PRESSURE: 98 MMHG

## 2024-03-20 DIAGNOSIS — I26.99 IATROGENIC PULMONARY EMBOLISM AND INFARCTION, INITIAL ENCOUNTER (HCC): ICD-10-CM

## 2024-03-20 DIAGNOSIS — T81.718A IATROGENIC PULMONARY EMBOLISM AND INFARCTION, INITIAL ENCOUNTER (HCC): ICD-10-CM

## 2024-03-20 LAB
ANION GAP SERPL CALCULATED.3IONS-SCNC: 11 MMOL/L (ref 7–16)
BUN SERPL-MCNC: 16 MG/DL (ref 6–23)
CALCIUM SERPL-MCNC: 9.5 MG/DL (ref 8.6–10.2)
CHLORIDE SERPL-SCNC: 105 MMOL/L (ref 98–107)
CO2 SERPL-SCNC: 25 MMOL/L (ref 22–29)
CREAT SERPL-MCNC: 1.2 MG/DL (ref 0.7–1.2)
ECHO BSA: 2.23 M2
ERYTHROCYTE [DISTWIDTH] IN BLOOD BY AUTOMATED COUNT: 12.8 % (ref 11.5–15)
GFR SERPL CREATININE-BSD FRML MDRD: 58 ML/MIN/1.73M2
GLUCOSE SERPL-MCNC: 115 MG/DL (ref 74–99)
HCT VFR BLD AUTO: 46.7 % (ref 37–54)
HGB BLD-MCNC: 15.6 G/DL (ref 12.5–16.5)
MCH RBC QN AUTO: 31.8 PG (ref 26–35)
MCHC RBC AUTO-ENTMCNC: 33.4 G/DL (ref 32–34.5)
MCV RBC AUTO: 95.3 FL (ref 80–99.9)
PLATELET # BLD AUTO: 144 K/UL (ref 130–450)
PMV BLD AUTO: 10.5 FL (ref 7–12)
POTASSIUM SERPL-SCNC: 4.4 MMOL/L (ref 3.5–5)
RBC # BLD AUTO: 4.9 M/UL (ref 3.8–5.8)
SODIUM SERPL-SCNC: 141 MMOL/L (ref 132–146)
WBC OTHER # BLD: 5.8 K/UL (ref 4.5–11.5)

## 2024-03-20 PROCEDURE — 80048 BASIC METABOLIC PNL TOTAL CA: CPT

## 2024-03-20 PROCEDURE — C1769 GUIDE WIRE: HCPCS | Performed by: SURGERY

## 2024-03-20 PROCEDURE — 2580000003 HC RX 258

## 2024-03-20 PROCEDURE — 2500000003 HC RX 250 WO HCPCS: Performed by: SURGERY

## 2024-03-20 PROCEDURE — 7100000011 HC PHASE II RECOVERY - ADDTL 15 MIN: Performed by: SURGERY

## 2024-03-20 PROCEDURE — 2709999900 HC NON-CHARGEABLE SUPPLY: Performed by: SURGERY

## 2024-03-20 PROCEDURE — C1880 VENA CAVA FILTER: HCPCS | Performed by: SURGERY

## 2024-03-20 PROCEDURE — 7100000010 HC PHASE II RECOVERY - FIRST 15 MIN: Performed by: SURGERY

## 2024-03-20 PROCEDURE — 37191 INS ENDOVAS VENA CAVA FILTR: CPT | Performed by: SURGERY

## 2024-03-20 PROCEDURE — 85027 COMPLETE CBC AUTOMATED: CPT

## 2024-03-20 DEVICE — FILTER VASC L50MM DIA30MM INTRO 7FR L65CM VENA CAVA FEM W: Type: IMPLANTABLE DEVICE | Status: FUNCTIONAL

## 2024-03-20 RX ORDER — SODIUM CHLORIDE 9 MG/ML
INJECTION, SOLUTION INTRAVENOUS PRN
Status: DISCONTINUED | OUTPATIENT
Start: 2024-03-20 | End: 2024-03-20 | Stop reason: HOSPADM

## 2024-03-20 RX ORDER — OXYCODONE HYDROCHLORIDE AND ACETAMINOPHEN 5; 325 MG/1; MG/1
2 TABLET ORAL EVERY 4 HOURS PRN
Status: DISCONTINUED | OUTPATIENT
Start: 2024-03-20 | End: 2024-03-20 | Stop reason: HOSPADM

## 2024-03-20 RX ORDER — SODIUM CHLORIDE 0.9 % (FLUSH) 0.9 %
5-40 SYRINGE (ML) INJECTION EVERY 12 HOURS SCHEDULED
Status: DISCONTINUED | OUTPATIENT
Start: 2024-03-20 | End: 2024-03-20 | Stop reason: HOSPADM

## 2024-03-20 RX ORDER — OXYCODONE HYDROCHLORIDE AND ACETAMINOPHEN 5; 325 MG/1; MG/1
1 TABLET ORAL EVERY 4 HOURS PRN
Status: DISCONTINUED | OUTPATIENT
Start: 2024-03-20 | End: 2024-03-20 | Stop reason: HOSPADM

## 2024-03-20 RX ORDER — SODIUM CHLORIDE 0.9 % (FLUSH) 0.9 %
5-40 SYRINGE (ML) INJECTION PRN
Status: DISCONTINUED | OUTPATIENT
Start: 2024-03-20 | End: 2024-03-20 | Stop reason: HOSPADM

## 2024-03-20 RX ORDER — ONDANSETRON 2 MG/ML
4 INJECTION INTRAMUSCULAR; INTRAVENOUS EVERY 8 HOURS PRN
Status: DISCONTINUED | OUTPATIENT
Start: 2024-03-20 | End: 2024-03-20 | Stop reason: HOSPADM

## 2024-03-20 RX ORDER — SODIUM CHLORIDE 9 MG/ML
INJECTION, SOLUTION INTRAVENOUS CONTINUOUS
Status: DISCONTINUED | OUTPATIENT
Start: 2024-03-20 | End: 2024-03-20 | Stop reason: HOSPADM

## 2024-03-20 RX ADMIN — SODIUM CHLORIDE: 9 INJECTION, SOLUTION INTRAVENOUS at 06:40

## 2024-03-20 NOTE — H&P
reviewed.    PHYSICAL EXAM:    Vitals:    03/20/24 0629   BP: (!) 175/98   Resp: 20   Temp: 97.1 °F (36.2 °C)   SpO2: 95%     CONSTITUTIONAL:  awake, alert, cooperative, no apparent distress, and appears stated age  NECK:  supple, symmetrical, trachea midline  LUNGS:  no increased work of breathing, good air exchange and clear to auscultation  CARDIOVASCULAR:  regular rate and rhythm  ABDOMEN:  non-distended    LABS:    Lab Results   Component Value Date    WBC 5.8 03/20/2024    HGB 15.6 03/20/2024    HCT 46.7 03/20/2024     03/20/2024    PROTIME 14.6 (H) 11/16/2022    INR 1.3 11/16/2022    APTT <20.0 (L) 12/21/2021    K 4.2 02/27/2024    BUN 17 02/27/2024    CREATININE 1.2 02/27/2024       RADIOLOGY:

## 2024-03-20 NOTE — PROGRESS NOTES
Patient brought to CVL HA to recover post vena cava filter insertion. Pt a/ox3, VSS. Comfort measures offered, tolerating po fluids. Right groin remains dry, intact, no bleeding or hematoma. Right pedal pulses 2+. Family to bedside.

## 2024-03-20 NOTE — PROGRESS NOTES
One hour bed rest complete. Patient and wife given discharge instructions. Verbalized understanding. Right groin remains dry, intact, no bleeding or hematomas. Right pedal pulses 2+. IV's removed. Patient discharged to home in stable condition.

## 2024-03-21 ENCOUNTER — HOSPITAL ENCOUNTER (OUTPATIENT)
Age: 79
Discharge: HOME | End: 2024-03-23

## 2024-03-21 LAB
ABO + RH BLD: NORMAL
ARM BAND NUMBER: NORMAL
BLOOD BANK SAMPLE EXPIRATION: NORMAL
BLOOD GROUP ANTIBODIES SERPL: NEGATIVE

## 2024-03-21 PROCEDURE — 86901 BLOOD TYPING SEROLOGIC RH(D): CPT

## 2024-03-21 PROCEDURE — 86900 BLOOD TYPING SEROLOGIC ABO: CPT

## 2024-03-21 PROCEDURE — 87081 CULTURE SCREEN ONLY: CPT

## 2024-03-21 PROCEDURE — 86850 RBC ANTIBODY SCREEN: CPT

## 2024-03-23 LAB
MICROORGANISM SPEC CULT: NORMAL
SPECIMEN DESCRIPTION: NORMAL

## 2024-03-30 ENCOUNTER — HOSPITAL ENCOUNTER (OUTPATIENT)
Age: 79
Discharge: HOME | End: 2024-04-01

## 2024-03-30 LAB
ANION GAP SERPL CALCULATED.3IONS-SCNC: 9 MMOL/L (ref 7–16)
BUN SERPL-MCNC: 25 MG/DL (ref 6–23)
CALCIUM SERPL-MCNC: 8.6 MG/DL (ref 8.6–10.2)
CHLORIDE SERPL-SCNC: 107 MMOL/L (ref 98–107)
CO2 SERPL-SCNC: 20 MMOL/L (ref 22–29)
CREAT SERPL-MCNC: 1.2 MG/DL (ref 0.7–1.2)
ERYTHROCYTE [DISTWIDTH] IN BLOOD BY AUTOMATED COUNT: 12.5 % (ref 11.5–15)
GFR SERPL CREATININE-BSD FRML MDRD: 60 ML/MIN/1.73M2
GLUCOSE SERPL-MCNC: 240 MG/DL (ref 74–99)
HCT VFR BLD AUTO: 42.1 % (ref 37–54)
HGB BLD-MCNC: 13.5 G/DL (ref 12.5–16.5)
MCH RBC QN AUTO: 31.6 PG (ref 26–35)
MCHC RBC AUTO-ENTMCNC: 32.1 G/DL (ref 32–34.5)
MCV RBC AUTO: 98.6 FL (ref 80–99.9)
PLATELET # BLD AUTO: 134 K/UL (ref 130–450)
PMV BLD AUTO: 10.4 FL (ref 7–12)
POTASSIUM SERPL-SCNC: 4.9 MMOL/L (ref 3.5–5)
RBC # BLD AUTO: 4.27 M/UL (ref 3.8–5.8)
SODIUM SERPL-SCNC: 136 MMOL/L (ref 132–146)
WBC OTHER # BLD: 11 K/UL (ref 4.5–11.5)

## 2024-03-30 PROCEDURE — 80048 BASIC METABOLIC PNL TOTAL CA: CPT

## 2024-03-30 PROCEDURE — 85027 COMPLETE CBC AUTOMATED: CPT

## 2024-03-31 ENCOUNTER — HOSPITAL ENCOUNTER (OUTPATIENT)
Age: 79
Discharge: HOME | End: 2024-04-02

## 2024-03-31 LAB
ANION GAP SERPL CALCULATED.3IONS-SCNC: 7 MMOL/L (ref 7–16)
BUN SERPL-MCNC: 23 MG/DL (ref 6–23)
CALCIUM SERPL-MCNC: 8.5 MG/DL (ref 8.6–10.2)
CHLORIDE SERPL-SCNC: 109 MMOL/L (ref 98–107)
CO2 SERPL-SCNC: 22 MMOL/L (ref 22–29)
CREAT SERPL-MCNC: 1.1 MG/DL (ref 0.7–1.2)
ERYTHROCYTE [DISTWIDTH] IN BLOOD BY AUTOMATED COUNT: 12.9 % (ref 11.5–15)
GFR SERPL CREATININE-BSD FRML MDRD: 68 ML/MIN/1.73M2
GLUCOSE SERPL-MCNC: 160 MG/DL (ref 74–99)
HCT VFR BLD AUTO: 41.1 % (ref 37–54)
HGB BLD-MCNC: 13.2 G/DL (ref 12.5–16.5)
MCH RBC QN AUTO: 32.5 PG (ref 26–35)
MCHC RBC AUTO-ENTMCNC: 32.1 G/DL (ref 32–34.5)
MCV RBC AUTO: 101.2 FL (ref 80–99.9)
PLATELET # BLD AUTO: 112 K/UL (ref 130–450)
PMV BLD AUTO: 10.3 FL (ref 7–12)
POTASSIUM SERPL-SCNC: 4.3 MMOL/L (ref 3.5–5)
RBC # BLD AUTO: 4.06 M/UL (ref 3.8–5.8)
SODIUM SERPL-SCNC: 138 MMOL/L (ref 132–146)
WBC OTHER # BLD: 9.1 K/UL (ref 4.5–11.5)

## 2024-03-31 PROCEDURE — 80048 BASIC METABOLIC PNL TOTAL CA: CPT

## 2024-03-31 PROCEDURE — 85027 COMPLETE CBC AUTOMATED: CPT

## 2024-04-01 ENCOUNTER — HOSPITAL ENCOUNTER (OUTPATIENT)
Age: 79
Discharge: HOME | End: 2024-04-03

## 2024-04-01 LAB
ANION GAP SERPL CALCULATED.3IONS-SCNC: 9 MMOL/L (ref 7–16)
BUN SERPL-MCNC: 20 MG/DL (ref 6–23)
CALCIUM SERPL-MCNC: 8.7 MG/DL (ref 8.6–10.2)
CHLORIDE SERPL-SCNC: 111 MMOL/L (ref 98–107)
CO2 SERPL-SCNC: 23 MMOL/L (ref 22–29)
CREAT SERPL-MCNC: 1.1 MG/DL (ref 0.7–1.2)
DIGOXIN SERPL-MCNC: <0.3 NG/ML (ref 0.8–2)
ERYTHROCYTE [DISTWIDTH] IN BLOOD BY AUTOMATED COUNT: 12.9 % (ref 11.5–15)
GFR SERPL CREATININE-BSD FRML MDRD: 70 ML/MIN/1.73M2
GLUCOSE SERPL-MCNC: 103 MG/DL (ref 74–99)
HCT VFR BLD AUTO: 40.4 % (ref 37–54)
HGB BLD-MCNC: 12.8 G/DL (ref 12.5–16.5)
MCH RBC QN AUTO: 32.2 PG (ref 26–35)
MCHC RBC AUTO-ENTMCNC: 31.7 G/DL (ref 32–34.5)
MCV RBC AUTO: 101.8 FL (ref 80–99.9)
PLATELET # BLD AUTO: 121 K/UL (ref 130–450)
PMV BLD AUTO: 10.4 FL (ref 7–12)
POTASSIUM SERPL-SCNC: 4.9 MMOL/L (ref 3.5–5)
RBC # BLD AUTO: 3.97 M/UL (ref 3.8–5.8)
SODIUM SERPL-SCNC: 143 MMOL/L (ref 132–146)
WBC OTHER # BLD: 7.3 K/UL (ref 4.5–11.5)

## 2024-04-01 PROCEDURE — 80048 BASIC METABOLIC PNL TOTAL CA: CPT

## 2024-04-01 PROCEDURE — 80162 ASSAY OF DIGOXIN TOTAL: CPT

## 2024-04-01 PROCEDURE — 85027 COMPLETE CBC AUTOMATED: CPT

## 2024-04-01 PROCEDURE — 84144 ASSAY OF PROGESTERONE: CPT

## 2024-04-03 LAB — PROGEST SERPL-MCNC: 0.18 NG/ML (ref 0–0.15)

## 2024-04-25 ENCOUNTER — HOSPITAL ENCOUNTER (OUTPATIENT)
Dept: ULTRASOUND IMAGING | Age: 79
Discharge: HOME | End: 2024-04-27
Payer: MEDICARE

## 2024-04-25 DIAGNOSIS — M79.662 PAIN OF LEFT LOWER LEG: ICD-10-CM

## 2024-04-25 PROCEDURE — 93971 EXTREMITY STUDY: CPT

## 2024-05-28 ENCOUNTER — OFFICE VISIT (OUTPATIENT)
Dept: VASCULAR SURGERY | Age: 79
End: 2024-05-28

## 2024-05-28 VITALS — WEIGHT: 215 LBS | BODY MASS INDEX: 29.16 KG/M2

## 2024-05-28 DIAGNOSIS — Z95.828 PRESENCE OF INFERIOR VENA CAVA FILTER: ICD-10-CM

## 2024-05-28 DIAGNOSIS — Z86.718 HX OF DEEP VENOUS THROMBOSIS: Primary | ICD-10-CM

## 2024-05-28 PROCEDURE — 99024 POSTOP FOLLOW-UP VISIT: CPT | Performed by: SURGERY

## 2024-05-28 NOTE — PROGRESS NOTES
Vascular Surgery Progress Note    Chief Complaint   Patient presents with    Post-Op Check     S/p IVC filter       Patient returns for post operative evaluation status post vena cava filter insertion.  The patient denies any unexpected problems since hospital discharge.  He underwent left knee replacement and is back on anticoagulation.        Procedure Laterality Date    BACK SURGERY      CATARACT REMOVAL      ENDOSCOPY, COLON, DIAGNOSTIC      EYE SURGERY      INVASIVE VASCULAR N/A 03/20/2024    Vena cava filter insertion - cath lab performed by Rafal Skelton MD at [facility] CARDIAC CATH LAB    IVC FILTER INSERTION  11/27/2013    [facility]    IVC FILTER REMOVAL  02/24/2014    [facility]    KNEE SURGERY Left     TOTAL NEPHRECTOMY Right 11/2013    Renal cell carcinoma       Physical Exam:  The incision(s) are healing without evidence of infection.  Heart rhythm is regular.          Right      Left   Brachial     Radial     Femoral     Popliteal     Dorsalis Pedis     Posterior Tibial     (3=normal, 2=diminished, 1=barely palpable, 4=widened)    Problem List Items Addressed This Visit       Hx of deep venous thrombosis - Primary     Other Visit Diagnoses       Presence of inferior vena cava filter                I reviewed with the patient and his wife that we will plan for retrieval of the vena cava filter.  I asked him to hold anticoagulation for 1 day prior.

## 2024-06-11 ENCOUNTER — TELEPHONE (OUTPATIENT)
Age: 79
End: 2024-06-11

## 2024-06-12 ENCOUNTER — APPOINTMENT (OUTPATIENT)
Dept: GENERAL RADIOLOGY | Age: 79
End: 2024-06-12
Attending: SURGERY
Payer: MEDICARE

## 2024-06-12 ENCOUNTER — HOSPITAL ENCOUNTER (OUTPATIENT)
Age: 79
Setting detail: OUTPATIENT SURGERY
Discharge: HOME | End: 2024-06-12
Attending: SURGERY | Admitting: SURGERY
Payer: MEDICARE

## 2024-06-12 VITALS
TEMPERATURE: 97.9 F | DIASTOLIC BLOOD PRESSURE: 85 MMHG | BODY MASS INDEX: 29.12 KG/M2 | HEIGHT: 72 IN | RESPIRATION RATE: 16 BRPM | HEART RATE: 84 BPM | SYSTOLIC BLOOD PRESSURE: 153 MMHG | OXYGEN SATURATION: 94 % | WEIGHT: 215 LBS

## 2024-06-12 DIAGNOSIS — I26.99 PULMONARY INFARCTION (HCC): ICD-10-CM

## 2024-06-12 LAB — ECHO BSA: 2.23 M2

## 2024-06-12 PROCEDURE — C1894 INTRO/SHEATH, NON-LASER: HCPCS | Performed by: SURGERY

## 2024-06-12 PROCEDURE — 2500000003 HC RX 250 WO HCPCS: Performed by: SURGERY

## 2024-06-12 PROCEDURE — 7100000011 HC PHASE II RECOVERY - ADDTL 15 MIN: Performed by: SURGERY

## 2024-06-12 PROCEDURE — 6360000004 HC RX CONTRAST MEDICATION: Performed by: SURGERY

## 2024-06-12 PROCEDURE — C1880 VENA CAVA FILTER: HCPCS | Performed by: SURGERY

## 2024-06-12 PROCEDURE — 71045 X-RAY EXAM CHEST 1 VIEW: CPT

## 2024-06-12 PROCEDURE — 6360000002 HC RX W HCPCS: Performed by: SURGERY

## 2024-06-12 PROCEDURE — 37193 REM ENDOVAS VENA CAVA FILTER: CPT | Performed by: SURGERY

## 2024-06-12 PROCEDURE — 7100000010 HC PHASE II RECOVERY - FIRST 15 MIN: Performed by: SURGERY

## 2024-06-12 PROCEDURE — 2709999900 HC NON-CHARGEABLE SUPPLY: Performed by: SURGERY

## 2024-06-12 RX ORDER — MIDAZOLAM HYDROCHLORIDE 1 MG/ML
INJECTION INTRAMUSCULAR; INTRAVENOUS PRN
Status: DISCONTINUED | OUTPATIENT
Start: 2024-06-12 | End: 2024-06-12 | Stop reason: HOSPADM

## 2024-06-12 RX ORDER — SODIUM CHLORIDE 9 MG/ML
INJECTION, SOLUTION INTRAVENOUS CONTINUOUS
Status: DISCONTINUED | OUTPATIENT
Start: 2024-06-12 | End: 2024-06-12 | Stop reason: HOSPADM

## 2024-06-12 RX ORDER — FENTANYL CITRATE 50 UG/ML
INJECTION, SOLUTION INTRAMUSCULAR; INTRAVENOUS PRN
Status: DISCONTINUED | OUTPATIENT
Start: 2024-06-12 | End: 2024-06-12 | Stop reason: HOSPADM

## 2024-06-12 RX ORDER — SODIUM CHLORIDE 9 MG/ML
INJECTION, SOLUTION INTRAVENOUS PRN
Status: DISCONTINUED | OUTPATIENT
Start: 2024-06-12 | End: 2024-06-12 | Stop reason: HOSPADM

## 2024-06-12 RX ORDER — SODIUM CHLORIDE 0.9 % (FLUSH) 0.9 %
5-40 SYRINGE (ML) INJECTION EVERY 12 HOURS SCHEDULED
Status: DISCONTINUED | OUTPATIENT
Start: 2024-06-12 | End: 2024-06-12 | Stop reason: HOSPADM

## 2024-06-12 RX ORDER — SODIUM CHLORIDE 0.9 % (FLUSH) 0.9 %
5-40 SYRINGE (ML) INJECTION PRN
Status: DISCONTINUED | OUTPATIENT
Start: 2024-06-12 | End: 2024-06-12 | Stop reason: HOSPADM

## 2024-06-12 NOTE — PROGRESS NOTES
Pt brought to prep recovery, on monitor, provided drink and snack.  Wife currently at bedside.  Called placed to Xray for portable.  Awaiting Xray then DC

## 2024-06-12 NOTE — PROGRESS NOTES
Portable chest x-ray completed. Patient resting comfortably. Dressing dry and intact to right neck. No bleeding or hematoma noted. Wife at bedside. Awaiting result of chest x-ray.

## 2024-06-12 NOTE — H&P
Father     Cancer Sister     Diabetes Brother        REVIEW OF SYSTEMS:  The chart was reviewed.    PHYSICAL EXAM:    Vitals:    06/12/24 1129   BP: (!) 147/89   Pulse: 93   Resp: 20   Temp: 97.9 °F (36.6 °C)   SpO2: 93%     CONSTITUTIONAL:  awake, alert, cooperative, no apparent distress, and appears stated age  NECK:  supple, symmetrical, trachea midline  LUNGS:  no increased work of breathing, good air exchange and clear to auscultation  CARDIOVASCULAR:  regular rate and rhythm and   ABDOMEN:  non-distended    LABS:    Lab Results   Component Value Date    WBC 7.3 04/01/2024    HGB 12.8 04/01/2024    HCT 40.4 04/01/2024     (L) 04/01/2024    PROTIME 14.6 (H) 11/16/2022    INR 1.3 11/16/2022    APTT <20.0 (L) 12/21/2021    K 4.9 04/01/2024    BUN 20 04/01/2024    CREATININE 1.1 04/01/2024       RADIOLOGY:

## 2024-06-25 ENCOUNTER — OFFICE VISIT (OUTPATIENT)
Dept: VASCULAR SURGERY | Age: 79
End: 2024-06-25
Payer: MEDICARE

## 2024-06-25 VITALS — BODY MASS INDEX: 29.16 KG/M2 | WEIGHT: 215 LBS

## 2024-06-25 DIAGNOSIS — Z95.828 PRESENCE OF INFERIOR VENA CAVA FILTER: Primary | ICD-10-CM

## 2024-06-25 PROCEDURE — 99212 OFFICE O/P EST SF 10 MIN: CPT | Performed by: NURSE PRACTITIONER

## 2024-06-25 PROCEDURE — 1123F ACP DISCUSS/DSCN MKR DOCD: CPT | Performed by: NURSE PRACTITIONER

## 2024-06-25 NOTE — PROGRESS NOTES
Vascular Surgery Progress Note    Chief Complaint   Patient presents with    Post-Op Check     Check removal of IVC filter       Patient returns for post operative evaluation status post vena cava filter retrieval.  The patient denies any unexpected problems since hospital discharge. He remains on anticoagulation following his knee replacement.         Procedure Laterality Date    BACK SURGERY      CATARACT REMOVAL      ENDOSCOPY, COLON, DIAGNOSTIC      EYE SURGERY      INVASIVE VASCULAR N/A 03/20/2024    Vena cava filter insertion - cath lab performed by Rafal Skelton MD at [facility] CARDIAC CATH LAB    INVASIVE VASCULAR N/A 6/12/2024    Remove vena cava filter performed by Rafal Skelton MD at [facility] CARDIAC CATH LAB    IVC FILTER INSERTION  11/27/2013    [facility]    IVC FILTER REMOVAL  02/24/2014    [facility]    KNEE SURGERY Left     TOTAL NEPHRECTOMY Right 11/2013    Renal cell carcinoma       Physical Exam: The puncture site is healing without evidence of infection. Heart rhythm is regular.          Right      Left   Brachial     Radial 2 2   Femoral     Popliteal     Dorsalis Pedis     Posterior Tibial     (3=normal, 2=diminished, 1=barely palpable, 4=widened)    Problem List Items Addressed This Visit    None  Visit Diagnoses       Presence of inferior vena cava filter    -  Primary          I reviewed with the patient that he can resume all activities as tolerated. I will not schedule him a follow up visit, but asked him to call back in the future with any problems.     Plan reviewed with Dr. Skelton.     Anna Sprague, LIDIA - CNP

## 2024-07-29 ENCOUNTER — APPOINTMENT (OUTPATIENT)
Dept: CT IMAGING | Age: 79
End: 2024-07-29
Payer: MEDICARE

## 2024-07-29 ENCOUNTER — APPOINTMENT (OUTPATIENT)
Dept: GENERAL RADIOLOGY | Age: 79
End: 2024-07-29
Payer: MEDICARE

## 2024-07-29 ENCOUNTER — HOSPITAL ENCOUNTER (INPATIENT)
Age: 79
LOS: 3 days | Discharge: HOME | End: 2024-08-01
Attending: EMERGENCY MEDICINE | Admitting: INTERNAL MEDICINE
Payer: MEDICARE

## 2024-07-29 DIAGNOSIS — J96.01 ACUTE RESPIRATORY FAILURE WITH HYPOXIA (HCC): ICD-10-CM

## 2024-07-29 DIAGNOSIS — R41.82 ALTERED MENTAL STATUS, UNSPECIFIED ALTERED MENTAL STATUS TYPE: Primary | ICD-10-CM

## 2024-07-29 PROBLEM — J96.90 RESPIRATORY FAILURE (HCC): Status: ACTIVE | Noted: 2024-07-29

## 2024-07-29 LAB
ALBUMIN SERPL-MCNC: 4 G/DL (ref 3.5–5.2)
ALP SERPL-CCNC: 91 U/L (ref 40–129)
ALT SERPL-CCNC: <5 U/L (ref 0–40)
AMPHET UR QL SCN: NEGATIVE
ANION GAP SERPL CALCULATED.3IONS-SCNC: 14 MMOL/L (ref 7–16)
APAP SERPL-MCNC: <5 UG/ML (ref 10–30)
AST SERPL-CCNC: 13 U/L (ref 0–39)
B PARAP IS1001 DNA NPH QL NAA+NON-PROBE: NOT DETECTED
B PERT DNA SPEC QL NAA+PROBE: NOT DETECTED
B.E.: -5 MMOL/L (ref -3–3)
BACTERIA URNS QL MICRO: ABNORMAL
BARBITURATES UR QL SCN: NEGATIVE
BASOPHILS # BLD: 0.05 K/UL (ref 0–0.2)
BASOPHILS NFR BLD: 1 % (ref 0–2)
BENZODIAZ UR QL: NEGATIVE
BILIRUB SERPL-MCNC: 0.4 MG/DL (ref 0–1.2)
BILIRUB UR QL STRIP: NEGATIVE
BNP SERPL-MCNC: 273 PG/ML (ref 0–450)
BUN SERPL-MCNC: 23 MG/DL (ref 6–23)
BUPRENORPHINE UR QL: NEGATIVE
C PNEUM DNA NPH QL NAA+NON-PROBE: NOT DETECTED
CALCIUM SERPL-MCNC: 9.4 MG/DL (ref 8.6–10.2)
CANNABINOIDS UR QL SCN: NEGATIVE
CHLORIDE SERPL-SCNC: 105 MMOL/L (ref 98–107)
CLARITY UR: CLEAR
CO2 SERPL-SCNC: 23 MMOL/L (ref 22–29)
COCAINE UR QL SCN: NEGATIVE
COLOR UR: YELLOW
CREAT SERPL-MCNC: 1.4 MG/DL (ref 0.7–1.2)
CRITICAL: ABNORMAL
DATE ANALYZED: ABNORMAL
DATE OF COLLECTION: ABNORMAL
EKG ATRIAL RATE: 91 BPM
EKG P AXIS: 47 DEGREES
EKG P-R INTERVAL: 182 MS
EKG Q-T INTERVAL: 402 MS
EKG QRS DURATION: 146 MS
EKG QTC CALCULATION (BAZETT): 494 MS
EKG R AXIS: -5 DEGREES
EKG T AXIS: 9 DEGREES
EKG VENTRICULAR RATE: 91 BPM
EOSINOPHIL # BLD: 0.16 K/UL (ref 0.05–0.5)
EOSINOPHILS RELATIVE PERCENT: 3 % (ref 0–6)
ERYTHROCYTE [DISTWIDTH] IN BLOOD BY AUTOMATED COUNT: 13.3 % (ref 11.5–15)
ETHANOLAMINE SERPL-MCNC: <10 MG/DL (ref 0–0.08)
FENTANYL UR QL: NEGATIVE
FLUAV RNA NPH QL NAA+NON-PROBE: NOT DETECTED
FLUBV RNA NPH QL NAA+NON-PROBE: NOT DETECTED
GFR, ESTIMATED: 52 ML/MIN/1.73M2
GLUCOSE BLD-MCNC: 261 MG/DL (ref 74–99)
GLUCOSE BLD-MCNC: 283 MG/DL (ref 74–99)
GLUCOSE SERPL-MCNC: 148 MG/DL (ref 74–99)
GLUCOSE UR STRIP-MCNC: NEGATIVE MG/DL
HADV DNA NPH QL NAA+NON-PROBE: NOT DETECTED
HCO3: 19.3 MMOL/L (ref 22–26)
HCOV 229E RNA NPH QL NAA+NON-PROBE: NOT DETECTED
HCOV HKU1 RNA NPH QL NAA+NON-PROBE: NOT DETECTED
HCOV NL63 RNA NPH QL NAA+NON-PROBE: NOT DETECTED
HCOV OC43 RNA NPH QL NAA+NON-PROBE: NOT DETECTED
HCT VFR BLD AUTO: 45.9 % (ref 37–54)
HGB BLD-MCNC: 14.8 G/DL (ref 12.5–16.5)
HGB UR QL STRIP.AUTO: NEGATIVE
HMPV RNA NPH QL NAA+NON-PROBE: NOT DETECTED
HPIV1 RNA NPH QL NAA+NON-PROBE: NOT DETECTED
HPIV2 RNA NPH QL NAA+NON-PROBE: NOT DETECTED
HPIV3 RNA NPH QL NAA+NON-PROBE: NOT DETECTED
HPIV4 RNA NPH QL NAA+NON-PROBE: NOT DETECTED
IMM GRANULOCYTES # BLD AUTO: <0.03 K/UL (ref 0–0.58)
IMM GRANULOCYTES NFR BLD: 0 % (ref 0–5)
INR PPP: 1.3
KETONES UR STRIP-MCNC: NEGATIVE MG/DL
LAB: ABNORMAL
LACTATE BLDV-SCNC: 0.9 MMOL/L (ref 0.5–1.9)
LACTATE BLDV-SCNC: 1.7 MMOL/L (ref 0.5–1.9)
LEUKOCYTE ESTERASE UR QL STRIP: NEGATIVE
LIPASE SERPL-CCNC: 52 U/L (ref 13–60)
LYMPHOCYTES NFR BLD: 1.6 K/UL (ref 1.5–4)
LYMPHOCYTES RELATIVE PERCENT: 30 % (ref 20–42)
Lab: 233
M PNEUMO DNA NPH QL NAA+NON-PROBE: NOT DETECTED
MAGNESIUM SERPL-MCNC: 1.9 MG/DL (ref 1.6–2.6)
MAGNESIUM SERPL-MCNC: 2 MG/DL (ref 1.6–2.6)
MCH RBC QN AUTO: 31.6 PG (ref 26–35)
MCHC RBC AUTO-ENTMCNC: 32.2 G/DL (ref 32–34.5)
MCV RBC AUTO: 98.1 FL (ref 80–99.9)
METHADONE UR QL: NEGATIVE
MODE: ABNORMAL
MONOCYTES NFR BLD: 0.61 K/UL (ref 0.1–0.95)
MONOCYTES NFR BLD: 12 % (ref 2–12)
NEUTROPHILS NFR BLD: 54 % (ref 43–80)
NEUTS SEG NFR BLD: 2.85 K/UL (ref 1.8–7.3)
NITRITE UR QL STRIP: NEGATIVE
O2 SATURATION: 95.7 % (ref 92–98.5)
OPERATOR ID: 8219
OPIATES UR QL SCN: NEGATIVE
OXYCODONE UR QL SCN: NEGATIVE
PATIENT TEMP: 37 C
PCO2: 34.3 MMHG (ref 35–45)
PCP UR QL SCN: NEGATIVE
PH BLOOD GAS: 7.37 (ref 7.35–7.45)
PH UR STRIP: 6 [PH] (ref 5–9)
PLATELET, FLUORESCENCE: 140 K/UL (ref 130–450)
PMV BLD AUTO: 10 FL (ref 7–12)
PO2: 80.5 MMHG (ref 75–100)
POTASSIUM SERPL-SCNC: 4.2 MMOL/L (ref 3.5–5)
PROCALCITONIN SERPL-MCNC: 0.08 NG/ML (ref 0–0.08)
PROT SERPL-MCNC: 6.7 G/DL (ref 6.4–8.3)
PROT UR STRIP-MCNC: NEGATIVE MG/DL
PROTHROMBIN TIME: 13.8 SEC (ref 9.3–12.4)
RBC # BLD AUTO: 4.68 M/UL (ref 3.8–5.8)
RBC #/AREA URNS HPF: ABNORMAL /HPF
RSV RNA NPH QL NAA+NON-PROBE: NOT DETECTED
RV+EV RNA NPH QL NAA+NON-PROBE: NOT DETECTED
SALICYLATES SERPL-MCNC: <0.3 MG/DL (ref 0–30)
SARS-COV-2 RNA NPH QL NAA+NON-PROBE: NOT DETECTED
SODIUM SERPL-SCNC: 142 MMOL/L (ref 132–146)
SOURCE, BLOOD GAS: ABNORMAL
SP GR UR STRIP: 1.01 (ref 1–1.03)
SPECIMEN DESCRIPTION: NORMAL
TEST INFORMATION: NORMAL
TIME ANALYZED: 241
TOXIC TRICYCLIC SC,BLOOD: NEGATIVE
TROPONIN I SERPL HS-MCNC: 22 NG/L (ref 0–11)
TROPONIN I SERPL HS-MCNC: 23 NG/L (ref 0–11)
TROPONIN I SERPL HS-MCNC: 23 NG/L (ref 0–11)
UROBILINOGEN UR STRIP-ACNC: 0.2 EU/DL (ref 0–1)
WBC #/AREA URNS HPF: ABNORMAL /HPF
WBC OTHER # BLD: 5.3 K/UL (ref 4.5–11.5)

## 2024-07-29 PROCEDURE — 83605 ASSAY OF LACTIC ACID: CPT

## 2024-07-29 PROCEDURE — 87449 NOS EACH ORGANISM AG IA: CPT

## 2024-07-29 PROCEDURE — 6370000000 HC RX 637 (ALT 250 FOR IP): Performed by: NURSE PRACTITIONER

## 2024-07-29 PROCEDURE — 80179 DRUG ASSAY SALICYLATE: CPT

## 2024-07-29 PROCEDURE — 36415 COLL VENOUS BLD VENIPUNCTURE: CPT

## 2024-07-29 PROCEDURE — 83880 ASSAY OF NATRIURETIC PEPTIDE: CPT

## 2024-07-29 PROCEDURE — 6360000002 HC RX W HCPCS: Performed by: EMERGENCY MEDICINE

## 2024-07-29 PROCEDURE — 82803 BLOOD GASES ANY COMBINATION: CPT

## 2024-07-29 PROCEDURE — G0480 DRUG TEST DEF 1-7 CLASSES: HCPCS

## 2024-07-29 PROCEDURE — 81001 URINALYSIS AUTO W/SCOPE: CPT

## 2024-07-29 PROCEDURE — 6370000000 HC RX 637 (ALT 250 FOR IP): Performed by: EMERGENCY MEDICINE

## 2024-07-29 PROCEDURE — 80307 DRUG TEST PRSMV CHEM ANLYZR: CPT

## 2024-07-29 PROCEDURE — 99285 EMERGENCY DEPT VISIT HI MDM: CPT

## 2024-07-29 PROCEDURE — 6360000002 HC RX W HCPCS: Performed by: NURSE PRACTITIONER

## 2024-07-29 PROCEDURE — 93005 ELECTROCARDIOGRAM TRACING: CPT | Performed by: EMERGENCY MEDICINE

## 2024-07-29 PROCEDURE — 83735 ASSAY OF MAGNESIUM: CPT

## 2024-07-29 PROCEDURE — 87899 AGENT NOS ASSAY W/OPTIC: CPT

## 2024-07-29 PROCEDURE — 80053 COMPREHEN METABOLIC PANEL: CPT

## 2024-07-29 PROCEDURE — 71045 X-RAY EXAM CHEST 1 VIEW: CPT

## 2024-07-29 PROCEDURE — 2140000000 HC CCU INTERMEDIATE R&B

## 2024-07-29 PROCEDURE — 83690 ASSAY OF LIPASE: CPT

## 2024-07-29 PROCEDURE — 2580000003 HC RX 258: Performed by: NURSE PRACTITIONER

## 2024-07-29 PROCEDURE — 85025 COMPLETE CBC W/AUTO DIFF WBC: CPT

## 2024-07-29 PROCEDURE — 2580000003 HC RX 258: Performed by: EMERGENCY MEDICINE

## 2024-07-29 PROCEDURE — 84484 ASSAY OF TROPONIN QUANT: CPT

## 2024-07-29 PROCEDURE — 85610 PROTHROMBIN TIME: CPT

## 2024-07-29 PROCEDURE — 87040 BLOOD CULTURE FOR BACTERIA: CPT

## 2024-07-29 PROCEDURE — 80143 DRUG ASSAY ACETAMINOPHEN: CPT

## 2024-07-29 PROCEDURE — 82962 GLUCOSE BLOOD TEST: CPT

## 2024-07-29 PROCEDURE — 84145 PROCALCITONIN (PCT): CPT

## 2024-07-29 PROCEDURE — 93010 ELECTROCARDIOGRAM REPORT: CPT | Performed by: INTERNAL MEDICINE

## 2024-07-29 PROCEDURE — 70450 CT HEAD/BRAIN W/O DYE: CPT

## 2024-07-29 PROCEDURE — 0202U NFCT DS 22 TRGT SARS-COV-2: CPT

## 2024-07-29 PROCEDURE — 6370000000 HC RX 637 (ALT 250 FOR IP): Performed by: INTERNAL MEDICINE

## 2024-07-29 PROCEDURE — 94640 AIRWAY INHALATION TREATMENT: CPT

## 2024-07-29 RX ORDER — GABAPENTIN 300 MG/1
300 CAPSULE ORAL 3 TIMES DAILY
Status: DISCONTINUED | OUTPATIENT
Start: 2024-07-29 | End: 2024-07-29

## 2024-07-29 RX ORDER — PREDNISONE 20 MG/1
40 TABLET ORAL DAILY
Qty: 10 TABLET | Refills: 0 | Status: SHIPPED | OUTPATIENT
Start: 2024-07-29 | End: 2024-08-03

## 2024-07-29 RX ORDER — SODIUM CHLORIDE 0.9 % (FLUSH) 0.9 %
5-40 SYRINGE (ML) INJECTION PRN
Status: DISCONTINUED | OUTPATIENT
Start: 2024-07-29 | End: 2024-08-01 | Stop reason: HOSPADM

## 2024-07-29 RX ORDER — SODIUM CHLORIDE 9 MG/ML
INJECTION, SOLUTION INTRAVENOUS PRN
Status: DISCONTINUED | OUTPATIENT
Start: 2024-07-29 | End: 2024-08-01 | Stop reason: HOSPADM

## 2024-07-29 RX ORDER — GABAPENTIN 300 MG/1
600 CAPSULE ORAL 3 TIMES DAILY
Status: DISCONTINUED | OUTPATIENT
Start: 2024-07-29 | End: 2024-08-01 | Stop reason: HOSPADM

## 2024-07-29 RX ORDER — IPRATROPIUM BROMIDE AND ALBUTEROL SULFATE 2.5; .5 MG/3ML; MG/3ML
1 SOLUTION RESPIRATORY (INHALATION) EVERY 4 HOURS PRN
Status: DISCONTINUED | OUTPATIENT
Start: 2024-07-29 | End: 2024-08-01 | Stop reason: HOSPADM

## 2024-07-29 RX ORDER — RASAGILINE 1 MG/1
1 TABLET ORAL DAILY
Status: DISCONTINUED | OUTPATIENT
Start: 2024-07-29 | End: 2024-08-01 | Stop reason: HOSPADM

## 2024-07-29 RX ORDER — ACETAMINOPHEN 650 MG/1
650 SUPPOSITORY RECTAL EVERY 6 HOURS PRN
Status: DISCONTINUED | OUTPATIENT
Start: 2024-07-29 | End: 2024-08-01 | Stop reason: HOSPADM

## 2024-07-29 RX ORDER — SODIUM CHLORIDE 0.9 % (FLUSH) 0.9 %
5-40 SYRINGE (ML) INJECTION EVERY 12 HOURS SCHEDULED
Status: DISCONTINUED | OUTPATIENT
Start: 2024-07-29 | End: 2024-08-01 | Stop reason: HOSPADM

## 2024-07-29 RX ORDER — MAGNESIUM SULFATE IN WATER 40 MG/ML
2000 INJECTION, SOLUTION INTRAVENOUS PRN
Status: DISCONTINUED | OUTPATIENT
Start: 2024-07-29 | End: 2024-08-01 | Stop reason: HOSPADM

## 2024-07-29 RX ORDER — CARBIDOPA AND LEVODOPA 25; 100 MG/1; MG/1
1 TABLET, ORALLY DISINTEGRATING ORAL 4 TIMES DAILY
Status: DISCONTINUED | OUTPATIENT
Start: 2024-07-29 | End: 2024-07-29 | Stop reason: DRUGHIGH

## 2024-07-29 RX ORDER — ACETAMINOPHEN 325 MG/1
650 TABLET ORAL EVERY 6 HOURS PRN
Status: DISCONTINUED | OUTPATIENT
Start: 2024-07-29 | End: 2024-08-01 | Stop reason: HOSPADM

## 2024-07-29 RX ORDER — IPRATROPIUM BROMIDE AND ALBUTEROL SULFATE 2.5; .5 MG/3ML; MG/3ML
3 SOLUTION RESPIRATORY (INHALATION) EVERY 4 HOURS PRN
Qty: 360 ML | Refills: 0 | Status: SHIPPED | OUTPATIENT
Start: 2024-07-29

## 2024-07-29 RX ORDER — POLYETHYLENE GLYCOL 3350 17 G/17G
17 POWDER, FOR SOLUTION ORAL DAILY PRN
Status: DISCONTINUED | OUTPATIENT
Start: 2024-07-29 | End: 2024-08-01 | Stop reason: HOSPADM

## 2024-07-29 RX ORDER — IPRATROPIUM BROMIDE AND ALBUTEROL SULFATE 2.5; .5 MG/3ML; MG/3ML
1 SOLUTION RESPIRATORY (INHALATION) ONCE
Status: COMPLETED | OUTPATIENT
Start: 2024-07-29 | End: 2024-07-29

## 2024-07-29 RX ORDER — ATORVASTATIN CALCIUM 10 MG/1
10 TABLET, FILM COATED ORAL DAILY
Status: DISCONTINUED | OUTPATIENT
Start: 2024-07-29 | End: 2024-08-01 | Stop reason: HOSPADM

## 2024-07-29 RX ORDER — GLIPIZIDE 5 MG/1
5 TABLET ORAL NIGHTLY
Status: DISCONTINUED | OUTPATIENT
Start: 2024-07-29 | End: 2024-08-01 | Stop reason: HOSPADM

## 2024-07-29 RX ORDER — POTASSIUM CHLORIDE 20 MEQ/1
40 TABLET, EXTENDED RELEASE ORAL PRN
Status: DISCONTINUED | OUTPATIENT
Start: 2024-07-29 | End: 2024-08-01 | Stop reason: HOSPADM

## 2024-07-29 RX ORDER — POTASSIUM CHLORIDE 7.45 MG/ML
10 INJECTION INTRAVENOUS PRN
Status: DISCONTINUED | OUTPATIENT
Start: 2024-07-29 | End: 2024-08-01 | Stop reason: HOSPADM

## 2024-07-29 RX ORDER — GABAPENTIN 300 MG/1
600 CAPSULE ORAL 3 TIMES DAILY
COMMUNITY

## 2024-07-29 RX ORDER — LISINOPRIL 10 MG/1
20 TABLET ORAL DAILY
Status: DISCONTINUED | OUTPATIENT
Start: 2024-07-29 | End: 2024-08-01 | Stop reason: HOSPADM

## 2024-07-29 RX ADMIN — GLIPIZIDE 5 MG: 5 TABLET ORAL at 21:19

## 2024-07-29 RX ADMIN — IPRATROPIUM BROMIDE AND ALBUTEROL SULFATE 1 DOSE: 2.5; .5 SOLUTION RESPIRATORY (INHALATION) at 05:13

## 2024-07-29 RX ADMIN — SODIUM CHLORIDE, PRESERVATIVE FREE 10 ML: 5 INJECTION INTRAVENOUS at 21:21

## 2024-07-29 RX ADMIN — APIXABAN 5 MG: 5 TABLET, FILM COATED ORAL at 21:19

## 2024-07-29 RX ADMIN — GABAPENTIN 600 MG: 300 CAPSULE ORAL at 16:44

## 2024-07-29 RX ADMIN — WATER 40 MG: 1 INJECTION INTRAMUSCULAR; INTRAVENOUS; SUBCUTANEOUS at 16:47

## 2024-07-29 RX ADMIN — WATER 125 MG: 1 INJECTION INTRAMUSCULAR; INTRAVENOUS; SUBCUTANEOUS at 06:50

## 2024-07-29 RX ADMIN — GABAPENTIN 600 MG: 300 CAPSULE ORAL at 21:19

## 2024-07-29 RX ADMIN — ATORVASTATIN CALCIUM 10 MG: 10 TABLET, FILM COATED ORAL at 16:45

## 2024-07-29 RX ADMIN — SODIUM CHLORIDE, PRESERVATIVE FREE 10 ML: 5 INJECTION INTRAVENOUS at 09:27

## 2024-07-29 RX ADMIN — CARBIDOPA AND LEVODOPA 2 TABLET: 25; 100 TABLET ORAL at 16:44

## 2024-07-29 RX ADMIN — CARBIDOPA AND LEVODOPA 1 TABLET: 25; 100 TABLET ORAL at 21:19

## 2024-07-29 RX ADMIN — LISINOPRIL 20 MG: 10 TABLET ORAL at 16:46

## 2024-07-29 ASSESSMENT — PAIN - FUNCTIONAL ASSESSMENT: PAIN_FUNCTIONAL_ASSESSMENT: NONE - DENIES PAIN

## 2024-07-29 NOTE — CARE COORDINATION
Social Work / Transition of Care:    Pt presents to the ED secondary to altered mental status from home.  Pt has hx of Parkinson's disease.      Pt is admitted inpatient with respiratory failure.    FORTINO spoke with pt's wife, Nina, over the phone.  Pt and wife live in a one floor condo.  Pt has a cane, walker, and rollator at home.  Pt has no recent falls.  Pt was initially on 4L oxygen upon arrival to the ED but now on room air.  Pt does not have home oxygen.  Pt has hx of knee surgery 4 months ago and had [facility] for therapy at home.  Pt has no hx of WALTER.  Pt's wife reports pt is sometimes a little confused and  supervises pt while bathing and dressing and assists pt with meals, medication, and transportation.  Pt's wife reports plan will be for pt to return home upon discharge and if HHC is needed, she would like pt to have [facility] again.  FORTINO/LONNIE to follow.

## 2024-07-29 NOTE — PLAN OF CARE
Problem: Chronic Conditions and Co-morbidities  Goal: Patient's chronic conditions and co-morbidity symptoms are monitored and maintained or improved  Outcome: Progressing  Flowsheets (Taken 7/29/2024 1515 by Rosy Montana, RN)  Care Plan - Patient's Chronic Conditions and Co-Morbidity Symptoms are Monitored and Maintained or Improved: Monitor and assess patient's chronic conditions and comorbid symptoms for stability, deterioration, or improvement     Problem: Discharge Planning  Goal: Discharge to home or other facility with appropriate resources  Outcome: Progressing  Flowsheets (Taken 7/29/2024 1515 by Rosy Montana, RN)  Discharge to home or other facility with appropriate resources:   Identify barriers to discharge with patient and caregiver   Arrange for needed discharge resources and transportation as appropriate   Identify discharge learning needs (meds, wound care, etc)   Refer to discharge planning if patient needs post-hospital services based on physician order or complex needs related to functional status, cognitive ability or social support system     Problem: Skin/Tissue Integrity  Goal: Absence of new skin breakdown  Description: 1.  Monitor for areas of redness and/or skin breakdown  2.  Assess vascular access sites hourly  3.  Every 4-6 hours minimum:  Change oxygen saturation probe site  4.  Every 4-6 hours:  If on nasal continuous positive airway pressure, respiratory therapy assess nares and determine need for appliance change or resting period.  Outcome: Progressing     Problem: Safety - Adult  Goal: Free from fall injury  Outcome: Progressing

## 2024-07-29 NOTE — PROGRESS NOTES
RN attempted to notify Dr. Reese that patients heart rate is still in the 100-120 range and dyspneic on exertion.

## 2024-07-29 NOTE — H&P
[facility]  History and Physical      CHIEF COMPLAINT:    Chief Complaint   Patient presents with    Altered Mental Status     Wife initially called EMS stating pt was unresponsive. EMS arrived, pt alert but confused. Pt told EMS he was SOB. Sat 91% room air, arrived on 4L. Pt now A&O x4, denies SOB. Per EMS pt more alert and oriented at triage than he was when they initially responded        Patient of Minor Cagle MD presents with:  Respiratory failure (HCC)    History of Present Illness:   Patient is an 79-year-old male with a past medical history of anxiety and depression, DM, DVT and PE on Eliquis indefinitely, Parkinson's disease, right renal carcinoma who presents emergency room for altered mental status.  Patients wife had initially called EMS stating patient was unresponsive at home however on arrival patient was alert just confused.  He was found to be hypoxic on room air was placed on 4 L nasal cannula.  Labs on arrival revealed a creatinine of 1.4, troponin 23-23, hyperglycemia of 148.  A chest x-ray and a CT head were both obtained revealing no acute findings.  On evaluation resting comfortably in no acute distress.  He actually is up and moving about in his room and just returned from the bathroom.  Wife is at bedside and is able to provide a history.  He became slightly confused at home for a few minutes, they called EMS but by the time EMS arrived he was back to his normal baseline.  Evaluated in the emergency room with completely normal blood work and vital signs.  He was admitted for \"respiratory failure\"    REVIEW OF SYSTEMS:  Pertinent negatives are above in HPI.  10 point ROS otherwise negative.      Past Medical History:   Diagnosis Date    Acute left lumbar radiculopathy 03/21/2021    YUDY (acute kidney injury) (HCC) 11/29/2013    Anxiety and depression     Cancer (HCC)     Cerebral atrophy (HCC)     Diabetes mellitus (HCC)     Disc displacement, lumbar     History of

## 2024-07-29 NOTE — PROGRESS NOTES
4 Eyes Skin Assessment     NAME:  Frankie Hernández  YOB: 1945  MEDICAL RECORD NUMBER:  [id number]    The patient is being assessed for  Admission    I agree that at least one RN has performed a thorough Head to Toe Skin Assessment on the patient. ALL assessment sites listed below have been assessed.      Areas assessed by both nurses:    Head, Face, Ears, Shoulders, Back, Chest, Arms, Elbows, Hands, Sacrum. Buttock, Coccyx, Ischium, Legs. Feet and Heels, and Under Medical Devices         Does the Patient have a Wound? No noted wound(s)       Ray Prevention initiated by RN: Yes  Wound Care Orders initiated by RN:No    Pressure Injury (Stage 3,4, Unstageable, DTI, NWPT, and Complex wounds) if present, place Wound referral order by RN under : No    New Ostomies, if present place, Ostomy referral order under : No     Nurse 1 eSignature: Electronically signed by Carlos Hernandez RN on 7/29/24 at 7:58 PM EDT    **SHARE this note so that the co-signing nurse can place an eSignature**    Nurse 2 eSignature: Electronically signed by Rosy Montana RN on 7/29/24 at 7:59 PM EDT

## 2024-07-29 NOTE — PROGRESS NOTES
CLINICAL PHARMACY NOTE: MEDS TO BEDS    Total # of Prescriptions Filled: 2   The following medications were delivered to the patient:  Prednisone 20 mg  Ipratropium-albuterol    Additional Documentation:   Delivered meds to patient at bedside, wife Nina received meds for patient   Discussed oral meds if recurrance.

## 2024-07-30 LAB
ALBUMIN SERPL-MCNC: 3.9 G/DL (ref 3.5–5.2)
ALP SERPL-CCNC: 84 U/L (ref 40–129)
ALT SERPL-CCNC: 7 U/L (ref 0–40)
ANION GAP SERPL CALCULATED.3IONS-SCNC: 13 MMOL/L (ref 7–16)
AST SERPL-CCNC: 12 U/L (ref 0–39)
BASOPHILS # BLD: 0.01 K/UL (ref 0–0.2)
BASOPHILS NFR BLD: 0 % (ref 0–2)
BILIRUB DIRECT SERPL-MCNC: <0.2 MG/DL (ref 0–0.3)
BILIRUB INDIRECT SERPL-MCNC: NORMAL MG/DL (ref 0–1)
BILIRUB SERPL-MCNC: 0.6 MG/DL (ref 0–1.2)
BUN SERPL-MCNC: 28 MG/DL (ref 6–23)
CALCIUM SERPL-MCNC: 9.3 MG/DL (ref 8.6–10.2)
CHLORIDE SERPL-SCNC: 108 MMOL/L (ref 98–107)
CO2 SERPL-SCNC: 20 MMOL/L (ref 22–29)
CREAT SERPL-MCNC: 1.3 MG/DL (ref 0.7–1.2)
D-DIMER QUANTITATIVE: 501 NG/ML DDU (ref 0–230)
EOSINOPHIL # BLD: 0 K/UL (ref 0.05–0.5)
EOSINOPHILS RELATIVE PERCENT: 0 % (ref 0–6)
ERYTHROCYTE [DISTWIDTH] IN BLOOD BY AUTOMATED COUNT: 13.2 % (ref 11.5–15)
GFR, ESTIMATED: 55 ML/MIN/1.73M2
GLUCOSE BLD-MCNC: 229 MG/DL (ref 74–99)
GLUCOSE BLD-MCNC: 236 MG/DL (ref 74–99)
GLUCOSE BLD-MCNC: 249 MG/DL (ref 74–99)
GLUCOSE SERPL-MCNC: 152 MG/DL (ref 74–99)
HCT VFR BLD AUTO: 44.2 % (ref 37–54)
HGB BLD-MCNC: 14.3 G/DL (ref 12.5–16.5)
IMM GRANULOCYTES # BLD AUTO: 0.07 K/UL (ref 0–0.58)
IMM GRANULOCYTES NFR BLD: 1 % (ref 0–5)
L PNEUMO1 AG UR QL IA.RAPID: NEGATIVE
LACTATE BLDV-SCNC: 2.4 MMOL/L (ref 0.5–2.2)
LYMPHOCYTES NFR BLD: 1.03 K/UL (ref 1.5–4)
LYMPHOCYTES RELATIVE PERCENT: 10 % (ref 20–42)
MCH RBC QN AUTO: 30.3 PG (ref 26–35)
MCHC RBC AUTO-ENTMCNC: 32.4 G/DL (ref 32–34.5)
MCV RBC AUTO: 93.6 FL (ref 80–99.9)
MONOCYTES NFR BLD: 0.62 K/UL (ref 0.1–0.95)
MONOCYTES NFR BLD: 6 % (ref 2–12)
NEUTROPHILS NFR BLD: 83 % (ref 43–80)
NEUTS SEG NFR BLD: 8.51 K/UL (ref 1.8–7.3)
PLATELET # BLD AUTO: 137 K/UL (ref 130–450)
PMV BLD AUTO: 10.1 FL (ref 7–12)
POTASSIUM SERPL-SCNC: 4.7 MMOL/L (ref 3.5–5)
PROT SERPL-MCNC: 6.5 G/DL (ref 6.4–8.3)
RBC # BLD AUTO: 4.72 M/UL (ref 3.8–5.8)
S PNEUM AG SPEC QL: NEGATIVE
SODIUM SERPL-SCNC: 141 MMOL/L (ref 132–146)
SPECIMEN SOURCE: NORMAL
WBC OTHER # BLD: 10.2 K/UL (ref 4.5–11.5)

## 2024-07-30 PROCEDURE — 97530 THERAPEUTIC ACTIVITIES: CPT

## 2024-07-30 PROCEDURE — 82962 GLUCOSE BLOOD TEST: CPT

## 2024-07-30 PROCEDURE — 36415 COLL VENOUS BLD VENIPUNCTURE: CPT

## 2024-07-30 PROCEDURE — 2580000003 HC RX 258: Performed by: NURSE PRACTITIONER

## 2024-07-30 PROCEDURE — 83605 ASSAY OF LACTIC ACID: CPT

## 2024-07-30 PROCEDURE — 6360000002 HC RX W HCPCS: Performed by: NURSE PRACTITIONER

## 2024-07-30 PROCEDURE — 97535 SELF CARE MNGMENT TRAINING: CPT

## 2024-07-30 PROCEDURE — 6370000000 HC RX 637 (ALT 250 FOR IP): Performed by: NURSE PRACTITIONER

## 2024-07-30 PROCEDURE — 2140000000 HC CCU INTERMEDIATE R&B

## 2024-07-30 PROCEDURE — 85379 FIBRIN DEGRADATION QUANT: CPT

## 2024-07-30 PROCEDURE — 85025 COMPLETE CBC W/AUTO DIFF WBC: CPT

## 2024-07-30 PROCEDURE — 97165 OT EVAL LOW COMPLEX 30 MIN: CPT

## 2024-07-30 PROCEDURE — 6370000000 HC RX 637 (ALT 250 FOR IP): Performed by: INTERNAL MEDICINE

## 2024-07-30 PROCEDURE — 97161 PT EVAL LOW COMPLEX 20 MIN: CPT

## 2024-07-30 PROCEDURE — 80053 COMPREHEN METABOLIC PANEL: CPT

## 2024-07-30 PROCEDURE — 82248 BILIRUBIN DIRECT: CPT

## 2024-07-30 RX ORDER — SODIUM CHLORIDE 9 MG/ML
INJECTION, SOLUTION INTRAVENOUS CONTINUOUS
Status: ACTIVE | OUTPATIENT
Start: 2024-07-30 | End: 2024-07-30

## 2024-07-30 RX ORDER — HYDRALAZINE HYDROCHLORIDE 20 MG/ML
10 INJECTION INTRAMUSCULAR; INTRAVENOUS EVERY 6 HOURS PRN
Status: DISCONTINUED | OUTPATIENT
Start: 2024-07-30 | End: 2024-08-01 | Stop reason: HOSPADM

## 2024-07-30 RX ORDER — METOPROLOL SUCCINATE 50 MG/1
50 TABLET, EXTENDED RELEASE ORAL DAILY
Status: DISCONTINUED | OUTPATIENT
Start: 2024-07-31 | End: 2024-08-01 | Stop reason: HOSPADM

## 2024-07-30 RX ORDER — INSULIN LISPRO 100 [IU]/ML
0-4 INJECTION, SOLUTION INTRAVENOUS; SUBCUTANEOUS
Status: DISCONTINUED | OUTPATIENT
Start: 2024-07-30 | End: 2024-08-01 | Stop reason: HOSPADM

## 2024-07-30 RX ORDER — INSULIN LISPRO 100 [IU]/ML
0-4 INJECTION, SOLUTION INTRAVENOUS; SUBCUTANEOUS NIGHTLY
Status: DISCONTINUED | OUTPATIENT
Start: 2024-07-30 | End: 2024-08-01 | Stop reason: HOSPADM

## 2024-07-30 RX ORDER — METOPROLOL SUCCINATE 25 MG/1
25 TABLET, EXTENDED RELEASE ORAL DAILY
Status: DISCONTINUED | OUTPATIENT
Start: 2024-07-30 | End: 2024-07-30

## 2024-07-30 RX ORDER — LANOLIN ALCOHOL/MO/W.PET/CERES
3 CREAM (GRAM) TOPICAL NIGHTLY PRN
Status: DISCONTINUED | OUTPATIENT
Start: 2024-07-30 | End: 2024-08-01 | Stop reason: HOSPADM

## 2024-07-30 RX ADMIN — APIXABAN 5 MG: 5 TABLET, FILM COATED ORAL at 09:16

## 2024-07-30 RX ADMIN — GABAPENTIN 600 MG: 300 CAPSULE ORAL at 15:29

## 2024-07-30 RX ADMIN — WATER 40 MG: 1 INJECTION INTRAMUSCULAR; INTRAVENOUS; SUBCUTANEOUS at 09:00

## 2024-07-30 RX ADMIN — INSULIN LISPRO 1 UNITS: 100 INJECTION, SOLUTION INTRAVENOUS; SUBCUTANEOUS at 12:40

## 2024-07-30 RX ADMIN — ATORVASTATIN CALCIUM 10 MG: 10 TABLET, FILM COATED ORAL at 09:16

## 2024-07-30 RX ADMIN — METOPROLOL SUCCINATE 25 MG: 25 TABLET, EXTENDED RELEASE ORAL at 12:39

## 2024-07-30 RX ADMIN — APIXABAN 5 MG: 5 TABLET, FILM COATED ORAL at 20:45

## 2024-07-30 RX ADMIN — CARBIDOPA AND LEVODOPA 2 TABLET: 25; 100 TABLET ORAL at 15:29

## 2024-07-30 RX ADMIN — CARBIDOPA AND LEVODOPA 1 TABLET: 25; 100 TABLET ORAL at 12:40

## 2024-07-30 RX ADMIN — HYDRALAZINE HYDROCHLORIDE 10 MG: 20 INJECTION INTRAMUSCULAR; INTRAVENOUS at 05:17

## 2024-07-30 RX ADMIN — CARBIDOPA AND LEVODOPA 1 TABLET: 25; 100 TABLET ORAL at 20:45

## 2024-07-30 RX ADMIN — GLIPIZIDE 5 MG: 5 TABLET ORAL at 20:45

## 2024-07-30 RX ADMIN — SODIUM CHLORIDE, PRESERVATIVE FREE 10 ML: 5 INJECTION INTRAVENOUS at 09:30

## 2024-07-30 RX ADMIN — WATER 40 MG: 1 INJECTION INTRAMUSCULAR; INTRAVENOUS; SUBCUTANEOUS at 02:35

## 2024-07-30 RX ADMIN — SODIUM CHLORIDE, PRESERVATIVE FREE 10 ML: 5 INJECTION INTRAVENOUS at 20:46

## 2024-07-30 RX ADMIN — LISINOPRIL 20 MG: 10 TABLET ORAL at 09:16

## 2024-07-30 RX ADMIN — INSULIN LISPRO 1 UNITS: 100 INJECTION, SOLUTION INTRAVENOUS; SUBCUTANEOUS at 17:00

## 2024-07-30 RX ADMIN — CARBIDOPA AND LEVODOPA 2 TABLET: 25; 100 TABLET ORAL at 09:16

## 2024-07-30 RX ADMIN — RASAGILINE 1 MG: 1 TABLET ORAL at 09:16

## 2024-07-30 RX ADMIN — SODIUM CHLORIDE: 9 INJECTION, SOLUTION INTRAVENOUS at 09:34

## 2024-07-30 RX ADMIN — GABAPENTIN 600 MG: 300 CAPSULE ORAL at 20:45

## 2024-07-30 RX ADMIN — Medication 3 MG: at 21:49

## 2024-07-30 RX ADMIN — GABAPENTIN 600 MG: 300 CAPSULE ORAL at 09:16

## 2024-07-30 NOTE — PLAN OF CARE
Problem: Chronic Conditions and Co-morbidities  Goal: Patient's chronic conditions and co-morbidity symptoms are monitored and maintained or improved  7/30/2024 1014 by Carlos Hernandez RN  Outcome: Progressing  7/30/2024 0008 by Ifrah Sánchez RN  Outcome: Progressing     Problem: Discharge Planning  Goal: Discharge to home or other facility with appropriate resources  7/30/2024 1014 by Carlos Hernandez RN  Outcome: Progressing  7/30/2024 0008 by Ifrah Sánchez RN  Outcome: Progressing     Problem: Skin/Tissue Integrity  Goal: Absence of new skin breakdown  Description: 1.  Monitor for areas of redness and/or skin breakdown  2.  Assess vascular access sites hourly  3.  Every 4-6 hours minimum:  Change oxygen saturation probe site  4.  Every 4-6 hours:  If on nasal continuous positive airway pressure, respiratory therapy assess nares and determine need for appliance change or resting period.  7/30/2024 1014 by Carlos Hernandez RN  Outcome: Progressing  7/30/2024 0008 by Ifrah Sánchez RN  Outcome: Progressing     Problem: Safety - Adult  Goal: Free from fall injury  7/30/2024 1014 by Carlos Hernandez RN  Outcome: Progressing  7/30/2024 0008 by Ifrah Sánchez RN  Outcome: Progressing

## 2024-07-30 NOTE — PROGRESS NOTES
Occupational Therapy  OCCUPATIONAL THERAPY INITIAL EVALUATION    [facility]  [address], [place]      Date:2024                                                Patient Name: Frankie Hernández  MRN: [id number]  : 1945  Room: [facility]-    Evaluating OT: Iker Bruno OTR/L #[id number]     Referring Provider: Gill Roger APRN - CNP   Specific Provider Orders/Date: OT eval and treat 24    Diagnosis: Respiratory failure (HCC) [J96.90]   Pt admitted to hospital with AMS and SOB     Pertinent Medical History:  has a past medical history of Acute left lumbar radiculopathy, YUDY (acute kidney injury) (HCC), Anxiety and depression, Cancer (HCC), Cerebral atrophy (HCC), Diabetes mellitus (HCC), Disc displacement, lumbar, History of pulmonary embolus (PE), Hx of deep venous thrombosis, Kidney disease, Nerve injury, Parkinson disease (HCC), Parkinson disease (HCC), Pneumonia, Pulmonary embolism (HCC), Renal carcinoma (HCC), Rhinovirus infection, and 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       Precautions:  Fall Risk, bed alarm, Hx parkinson's    Assessment of current deficits    [x] Functional mobility  [x]ADLs  [x] Strength               []Cognition    [x] Functional transfers   [x] IADLs         [x] Safety Awareness   [x]Endurance    [] Fine Coordination              [x] Balance      [] Vision/perception   []Sensation     []Gross Motor Coordination  [] ROM  [] Delirium                   [] Motor Control     OT PLAN OF CARE   OT POC based on physician orders, patient diagnosis and results of clinical assessment    Frequency/Duration 1-3 days/wk for 2 weeks PRN   Specific OT Treatment Interventions to include:   * Instruction/training on adapted ADL techniques and AE recommendations to increase functional independence within precautions       * Training on energy conservation strategies, correct breathing pattern and techniques to

## 2024-07-30 NOTE — PROGRESS NOTES
x: 3 to person, place, and year but not month.  Sensation: Pt reports L hand numbness and tingling.  Edema: Unremarkable    Patient education  Pt educated on PT role in acute care setting.    Patient response to education:   Pt verbalized understanding Pt demonstrated skill Pt requires further education in this area   Yes NA No     ASSESSMENT:    Conditions Requiring Skilled Therapeutic Intervention:    [x]Decreased strength     []Decreased ROM  [x]Decreased functional mobility  [x]Decreased balance   [x]Decreased endurance   []Decreased posture  [x]Decreased sensation  []Decreased coordination   []Decreased vision  [x]Decreased safety awareness   []Increased pain       Comments:    Pt was in bed upon room entry; agreeable to PT evaluation. Wife present. Pt is grossly oriented but soft spoke due to Parkinson's. Pt completed all mobility noted above. Light assist provided for mobility. Pt ambulated to/from bathroom with WW. Gait was slow with small steps. Pt was unsteady. Pt completed transfers from commode and ambulated back to chair. HR ranged from 103-112 bpm and O2 sat was 92-95% on RA with all activity. Pt was left in chair with all needs met at conclusion of session. Wife present.    Treatment:  Patient practiced and was instructed in the following treatment:    Therapeutic activities:  Bed mobility: Pt was cued for technique during bed mobility transfers.  Transfers: Pt was cued for hand placement during sit <> stand transfers. Pt completed multiple transfers from different surface heights (EOB x1, chair x1, commode x1).  Ambulation: Pt was cued for WW technique and safety when ambulating. Pt ambulated x3 bouts.  Vitals and symptoms were closely monitored throughout session.    Pt's/family goals:  1. To return home.    Prognosis is Good for reaching above PT goals.    Patient and or family understand(s) diagnosis, prognosis, and plan of care.  Yes    PHYSICAL THERAPY PLAN OF CARE:    PT POC is established

## 2024-07-30 NOTE — PROGRESS NOTES
[facility]                                Progress note    Subjective:    Sleeping on assessment  Resting comfortably in bed  Wife at bedside  Questions answered    Objective:    /85   Pulse (!) 101   Temp 97.7 °F (36.5 °C) (Oral)   Resp 18   Ht 1.829 m (6')   Wt 104.8 kg (231 lb 0.7 oz)   SpO2 92%   BMI 31.33 kg/m²     In: -   Out: 150   In: -   Out: 150 [Urine:150]    General appearance: Sleeping  HEENT: AT/NC, MMM  Neck: FROM, supple  Lungs: Diminished, room air  CV: Tachycardia, no MRGs  Vasc: Radial pulses 2+  Abdomen: Soft, non-tender; no masses or HSM  Extremities: No peripheral edema or digital cyanosis  Skin: no rash, lesions or ulcers  Psych: JEOVANY  Neuro: JEOVANY     Recent Labs     07/29/24  0149 07/30/24 0419   WBC 5.3 10.2   HGB 14.8 14.3   HCT 45.9 44.2   PLT  --  137       Recent Labs     07/29/24  0149 07/30/24 0419    141   K 4.2 4.7    108*   CO2 23 20*   BUN 23 28*   CREATININE 1.4* 1.3*   CALCIUM 9.4 9.3       Assessment:    Principal Problem:    Respiratory failure (HCC)  Resolved Problems:    * No resolved hospital problems. *      Plan:  Patient is a 79-year-old male with a hx of DVT on eliquis, HTN, Parkinson's disease who is admitted to [facility] for  Acute respiratory failure with hypoxia  -Monitor labs  -proBNP 273  -Check procalcitonin  -Respiratory panel negative  -IV Solu-Medrol 40 mg every 8 hours  -Continue scheduled breathing treatments  -Initially on 4 L nasal cannula, weaned down to room air satting 95% now  -Resume home medications     Hypertension/sinus tachycardia  -Continue home medications with parameters  -Continue to monitor Bps  -Continue to monitor through tonight due to elevated heart rate  -Has not gotten any medication since yesterday     Parkinson's disease  -Continue home Sinemet     HX DVT  -Continue home Eliquis    7/30/24  -D-dimer 501  -Continue home Eliquis  -Wean IV steroids to 40 daily  -Started on p.o. metoprolol XL  25 mg daily  -Tachycardia remains about the same however BP significantly improved to 130/85  -IVF NS at 100 x 5 hours  -PT AM-PAC 16/24, OT AM-PAC 19/24  -Plan is for discharge home with wife  -Weaned off supplemental O2 satting 93% on room air  -Check ambulatory pulse ox      Code status: Full  Consultants: None     DVT Prophylaxis Eliquis  PT/OT  Discharge planning       I have spent a total time of 30 minutes of this patient encounter reviewing chart, labs, coordinating care with interdisciplinary teams, face to face encounter with patient, providing counseling/education to patient/family.      LIDIA Hernandez - CNP,  3:03 PM  7/30/2024    Above note edited to reflect my thoughts   Mild elevation in D-dimer though low likelihood of PE, will obtain CT chest to rule out any small PE given reason for admission was hypoxemia  Persistent tachycardia-initiated on Toprol-XL today, will increase to 50 mg  Ambulating pulse ox was 88%  Await results of CTA    I personally saw and examined and provided care for the patient. Radiographs, labs and medication list were reviewed by me independently.  The case was discussed in detail and plans for care were established. Review of LIDIA Hernandez-CNP   , documentation was conducted and revisions were made as appropriate directly by me. I agree with the above documented exam, problem list, and plan of care.     Merly Reese MD  8:15 PM  7/30/2024

## 2024-07-30 NOTE — PLAN OF CARE
Problem: Chronic Conditions and Co-morbidities  Goal: Patient's chronic conditions and co-morbidity symptoms are monitored and maintained or improved  7/30/2024 0008 by Ifrah Sánchez RN  Outcome: Progressing  7/29/2024 1810 by Carlos Hernandez RN  Outcome: Progressing  Flowsheets (Taken 7/29/2024 1515 by Rosy Montana, RN)  Care Plan - Patient's Chronic Conditions and Co-Morbidity Symptoms are Monitored and Maintained or Improved: Monitor and assess patient's chronic conditions and comorbid symptoms for stability, deterioration, or improvement     Problem: Discharge Planning  Goal: Discharge to home or other facility with appropriate resources  7/30/2024 0008 by Ifrah Sánchez RN  Outcome: Progressing  7/29/2024 1810 by Carlos Hernandez RN  Outcome: Progressing  Flowsheets (Taken 7/29/2024 1515 by Rosy Montana, RN)  Discharge to home or other facility with appropriate resources:   Identify barriers to discharge with patient and caregiver   Arrange for needed discharge resources and transportation as appropriate   Identify discharge learning needs (meds, wound care, etc)   Refer to discharge planning if patient needs post-hospital services based on physician order or complex needs related to functional status, cognitive ability or social support system     Problem: Skin/Tissue Integrity  Goal: Absence of new skin breakdown  Description: 1.  Monitor for areas of redness and/or skin breakdown  2.  Assess vascular access sites hourly  3.  Every 4-6 hours minimum:  Change oxygen saturation probe site  4.  Every 4-6 hours:  If on nasal continuous positive airway pressure, respiratory therapy assess nares and determine need for appliance change or resting period.  7/30/2024 0008 by Ifrah Sánchez RN  Outcome: Progressing  7/29/2024 1810 by Carlos Hernandez RN  Outcome: Progressing     Problem: Safety - Adult  Goal: Free from fall injury  7/30/2024 0008 by Ifrah Sánchez RN  Outcome: Progressing  7/29/2024

## 2024-07-30 NOTE — PROGRESS NOTES
Left message on answering service for Alana Learn regarding current BP and HR.    Ivan Hernandez RN

## 2024-07-30 NOTE — ED PROVIDER NOTES
[facility]  EMERGENCY DEPARTMENT ENCOUNTER        Pt Name: Frankie Hernández  MRN: [id number]  Birthdate 1945  Date of evaluation: 7/29/2024  Provider: Eligio Lew DO  PCP: Minor Cagle MD  Note Started: 6:17 AM EDT 7/30/24    CHIEF COMPLAINT       Chief Complaint   Patient presents with    Altered Mental Status     Wife initially called EMS stating pt was unresponsive. EMS arrived, pt alert but confused. Pt told EMS he was SOB. Sat 91% room air, arrived on 4L. Pt now A&O x4, denies SOB. Per EMS pt more alert and oriented at triage than he was when they initially responded       HISTORY OF PRESENT ILLNESS: 1 or more Elements   History From: Patient/EMS/family    Limitations to history : None    Frankie Hernández is a 79 y.o. male who presents to the emergency department for altered mental status.  Per reports patient was getting up to go to bed when he was slow to respond and not acting himself.  Due to this episode patient family called EMS.  Upon EMSs arrival patient was alert but confused.  He was complaining of some shortness of breath to EMS.  They report he came to the ED to be evaluated.  Patient denies any chest pain.  Denies any numbness or tingling.  Does complain of some shortness of breath to myself.  Denies any cough.  Patient does have a history of Parkinson's.    Nursing Notes were all reviewed and agreed with or any disagreements were addressed in the HPI.      REVIEW OF EXTERNAL NOTE :       PDMP Monitoring:    Last PDMP Isaias as Reviewed:  Review User Review Instant Review Result            Urine Drug Screenings (1 yr)       URINE DRUG SCREEN  Collected: 7/29/2024  1:48 AM (Final result)              Serum Drug Screen  Collected: 7/29/2024  1:49 AM (Final result)                  Medication Contract and Consent for Opioid Use Documents Filed        No documents found                      REVIEW OF SYSTEMS :           Positives and Pertinent negatives as per HPI.  BRAIN/VENTRICLES: There is no acute intracranial hemorrhage, mass effect or midline shift.  No abnormal extra-axial fluid collection.  The gray-white differentiation is maintained without evidence of an acute infarct.  There is no evidence of hydrocephalus. Mild atrophy and periventricular white matter degenerative change. ORBITS: The visualized portion of the orbits demonstrate no acute abnormality. SINUSES: The visualized paranasal sinuses and mastoid air cells demonstrate no acute abnormality. SOFT TISSUES/SKULL:  No acute abnormality of the visualized skull or soft tissues.     No acute intracranial abnormality.     XR CHEST PORTABLE    Result Date: 7/29/2024  EXAMINATION: ONE XRAY VIEW OF THE CHEST 7/29/2024 3:04 am COMPARISON: June 12, 2024 HISTORY: ORDERING SYSTEM PROVIDED HISTORY: ams TECHNOLOGIST PROVIDED HISTORY: Reason for exam:->ams FINDINGS: Mild cardiomegaly.  Lungs clear.  No pneumothorax or effusion. Body wall soft tissues unremarkable. Osseous thorax intact.Cervical ACDF partially visible.     1. No acute cardiopulmonary process. 2. Mild cardiomegaly.     XR FOOT RIGHT (MIN 3 VIEWS)    Result Date: 7/24/2024  Imaging Result:  Radiological Evaluation:  3 views taken(AP, MO, lateral views), and reviewed of the _right_ foot reveal good/normal bony density. No fractures, subluxations or dislocations noted. There _is_ a plantar calcaneal heel spur noted.      No results found.    PROCEDURES   Unless otherwise noted below, none          CRITICAL CARE TIME (.cct)   Please note that the withdrawal or failure to initiate urgent interventions for this patient would likely result in a life threatening deterioration or permanent disability.      Accordingly this patient received 31 minutes of critical care time, excluding separately billable procedures.      PAST MEDICAL HISTORY/Chronic Conditions Affecting Care      has a past medical history of Acute left lumbar radiculopathy (03/21/2021), YUDY (acute kidney

## 2024-07-31 ENCOUNTER — APPOINTMENT (OUTPATIENT)
Dept: CT IMAGING | Age: 79
End: 2024-07-31
Attending: INTERNAL MEDICINE
Payer: MEDICARE

## 2024-07-31 ENCOUNTER — APPOINTMENT (OUTPATIENT)
Dept: NUCLEAR MEDICINE | Age: 79
End: 2024-07-31
Payer: MEDICARE

## 2024-07-31 LAB
ALBUMIN SERPL-MCNC: 4 G/DL (ref 3.5–5.2)
ALP SERPL-CCNC: 78 U/L (ref 40–129)
ALT SERPL-CCNC: <5 U/L (ref 0–40)
ANION GAP SERPL CALCULATED.3IONS-SCNC: 16 MMOL/L (ref 7–16)
AST SERPL-CCNC: 18 U/L (ref 0–39)
BASOPHILS # BLD: 0.02 K/UL (ref 0–0.2)
BASOPHILS NFR BLD: 0 % (ref 0–2)
BILIRUB DIRECT SERPL-MCNC: <0.2 MG/DL (ref 0–0.3)
BILIRUB INDIRECT SERPL-MCNC: NORMAL MG/DL (ref 0–1)
BILIRUB SERPL-MCNC: 0.5 MG/DL (ref 0–1.2)
BUN SERPL-MCNC: 29 MG/DL (ref 6–23)
CALCIUM SERPL-MCNC: 9.5 MG/DL (ref 8.6–10.2)
CHLORIDE SERPL-SCNC: 109 MMOL/L (ref 98–107)
CO2 SERPL-SCNC: 19 MMOL/L (ref 22–29)
CREAT SERPL-MCNC: 1.2 MG/DL (ref 0.7–1.2)
EOSINOPHIL # BLD: 0 K/UL (ref 0.05–0.5)
EOSINOPHILS RELATIVE PERCENT: 0 % (ref 0–6)
ERYTHROCYTE [DISTWIDTH] IN BLOOD BY AUTOMATED COUNT: 13.3 % (ref 11.5–15)
GFR, ESTIMATED: 60 ML/MIN/1.73M2
GLUCOSE BLD-MCNC: 144 MG/DL (ref 74–99)
GLUCOSE BLD-MCNC: 216 MG/DL (ref 74–99)
GLUCOSE BLD-MCNC: 320 MG/DL (ref 74–99)
GLUCOSE BLD-MCNC: 87 MG/DL (ref 74–99)
GLUCOSE SERPL-MCNC: 69 MG/DL (ref 74–99)
HCT VFR BLD AUTO: 45 % (ref 37–54)
HGB BLD-MCNC: 14.6 G/DL (ref 12.5–16.5)
IMM GRANULOCYTES # BLD AUTO: 0.09 K/UL (ref 0–0.58)
IMM GRANULOCYTES NFR BLD: 1 % (ref 0–5)
LYMPHOCYTES NFR BLD: 1.32 K/UL (ref 1.5–4)
LYMPHOCYTES RELATIVE PERCENT: 10 % (ref 20–42)
MCH RBC QN AUTO: 30.7 PG (ref 26–35)
MCHC RBC AUTO-ENTMCNC: 32.4 G/DL (ref 32–34.5)
MCV RBC AUTO: 94.7 FL (ref 80–99.9)
MONOCYTES NFR BLD: 0.88 K/UL (ref 0.1–0.95)
MONOCYTES NFR BLD: 7 % (ref 2–12)
NEUTROPHILS NFR BLD: 82 % (ref 43–80)
NEUTS SEG NFR BLD: 10.58 K/UL (ref 1.8–7.3)
PLATELET # BLD AUTO: 142 K/UL (ref 130–450)
PMV BLD AUTO: 10.6 FL (ref 7–12)
POTASSIUM SERPL-SCNC: 4.2 MMOL/L (ref 3.5–5)
PROT SERPL-MCNC: 6.4 G/DL (ref 6.4–8.3)
RBC # BLD AUTO: 4.75 M/UL (ref 3.8–5.8)
SODIUM SERPL-SCNC: 144 MMOL/L (ref 132–146)
WBC OTHER # BLD: 12.9 K/UL (ref 4.5–11.5)

## 2024-07-31 PROCEDURE — 85025 COMPLETE CBC W/AUTO DIFF WBC: CPT

## 2024-07-31 PROCEDURE — A9539 TC99M PENTETATE: HCPCS | Performed by: RADIOLOGY

## 2024-07-31 PROCEDURE — 6370000000 HC RX 637 (ALT 250 FOR IP): Performed by: INTERNAL MEDICINE

## 2024-07-31 PROCEDURE — 36415 COLL VENOUS BLD VENIPUNCTURE: CPT

## 2024-07-31 PROCEDURE — A9540 TC99M MAA: HCPCS | Performed by: RADIOLOGY

## 2024-07-31 PROCEDURE — 6370000000 HC RX 637 (ALT 250 FOR IP): Performed by: NURSE PRACTITIONER

## 2024-07-31 PROCEDURE — 80053 COMPREHEN METABOLIC PANEL: CPT

## 2024-07-31 PROCEDURE — 3430000000 HC RX DIAGNOSTIC RADIOPHARMACEUTICAL: Performed by: RADIOLOGY

## 2024-07-31 PROCEDURE — 82962 GLUCOSE BLOOD TEST: CPT

## 2024-07-31 PROCEDURE — 2140000000 HC CCU INTERMEDIATE R&B

## 2024-07-31 PROCEDURE — 82248 BILIRUBIN DIRECT: CPT

## 2024-07-31 PROCEDURE — 2580000003 HC RX 258: Performed by: NURSE PRACTITIONER

## 2024-07-31 PROCEDURE — 6360000002 HC RX W HCPCS: Performed by: NURSE PRACTITIONER

## 2024-07-31 PROCEDURE — 78582 LUNG VENTILAT&PERFUS IMAGING: CPT

## 2024-07-31 RX ORDER — SODIUM CHLORIDE 0.9 % (FLUSH) 0.9 %
10 SYRINGE (ML) INJECTION
Status: ACTIVE | OUTPATIENT
Start: 2024-07-31 | End: 2024-08-01

## 2024-07-31 RX ORDER — AMLODIPINE BESYLATE 5 MG/1
2.5 TABLET ORAL DAILY
Status: DISCONTINUED | OUTPATIENT
Start: 2024-07-31 | End: 2024-08-01 | Stop reason: HOSPADM

## 2024-07-31 RX ORDER — KIT FOR THE PREPARATION OF TECHNETIUM TC 99M PENTETATE 20 MG/1
35 INJECTION, POWDER, LYOPHILIZED, FOR SOLUTION INTRAVENOUS; RESPIRATORY (INHALATION)
Status: COMPLETED | OUTPATIENT
Start: 2024-07-31 | End: 2024-07-31

## 2024-07-31 RX ADMIN — METOPROLOL SUCCINATE 50 MG: 50 TABLET, EXTENDED RELEASE ORAL at 09:02

## 2024-07-31 RX ADMIN — CARBIDOPA AND LEVODOPA 2 TABLET: 25; 100 TABLET ORAL at 09:01

## 2024-07-31 RX ADMIN — INSULIN LISPRO 3 UNITS: 100 INJECTION, SOLUTION INTRAVENOUS; SUBCUTANEOUS at 17:35

## 2024-07-31 RX ADMIN — ATORVASTATIN CALCIUM 10 MG: 10 TABLET, FILM COATED ORAL at 09:01

## 2024-07-31 RX ADMIN — LISINOPRIL 20 MG: 10 TABLET ORAL at 09:00

## 2024-07-31 RX ADMIN — SODIUM CHLORIDE, PRESERVATIVE FREE 10 ML: 5 INJECTION INTRAVENOUS at 09:02

## 2024-07-31 RX ADMIN — APIXABAN 5 MG: 5 TABLET, FILM COATED ORAL at 20:31

## 2024-07-31 RX ADMIN — GABAPENTIN 600 MG: 300 CAPSULE ORAL at 13:41

## 2024-07-31 RX ADMIN — GLIPIZIDE 5 MG: 5 TABLET ORAL at 20:31

## 2024-07-31 RX ADMIN — SODIUM CHLORIDE, PRESERVATIVE FREE 10 ML: 5 INJECTION INTRAVENOUS at 20:32

## 2024-07-31 RX ADMIN — RASAGILINE 1 MG: 1 TABLET ORAL at 09:02

## 2024-07-31 RX ADMIN — GABAPENTIN 600 MG: 300 CAPSULE ORAL at 20:32

## 2024-07-31 RX ADMIN — APIXABAN 5 MG: 5 TABLET, FILM COATED ORAL at 09:01

## 2024-07-31 RX ADMIN — Medication 6 MILLICURIE: at 13:18

## 2024-07-31 RX ADMIN — AMLODIPINE BESYLATE 2.5 MG: 5 TABLET ORAL at 16:16

## 2024-07-31 RX ADMIN — CARBIDOPA AND LEVODOPA 1 TABLET: 25; 100 TABLET ORAL at 20:32

## 2024-07-31 RX ADMIN — WATER 40 MG: 1 INJECTION INTRAMUSCULAR; INTRAVENOUS; SUBCUTANEOUS at 09:01

## 2024-07-31 RX ADMIN — CARBIDOPA AND LEVODOPA 2 TABLET: 25; 100 TABLET ORAL at 16:16

## 2024-07-31 RX ADMIN — CARBIDOPA AND LEVODOPA 1 TABLET: 25; 100 TABLET ORAL at 13:41

## 2024-07-31 RX ADMIN — GABAPENTIN 600 MG: 300 CAPSULE ORAL at 09:01

## 2024-07-31 RX ADMIN — KIT FOR THE PREPARATION OF TECHNETIUM TC 99M PENTETATE 35 MILLICURIE: 20 INJECTION, POWDER, LYOPHILIZED, FOR SOLUTION INTRAVENOUS; RESPIRATORY (INHALATION) at 12:58

## 2024-07-31 ASSESSMENT — PAIN SCALES - GENERAL
PAINLEVEL_OUTOF10: 0
PAINLEVEL_OUTOF10: 0

## 2024-07-31 NOTE — PROGRESS NOTES
[facility]                                Progress note    Subjective:    Resting in bed  No complaints    Objective:    BP (!) 153/85   Pulse 81   Temp 98 °F (36.7 °C) (Oral)   Resp 19   Ht 1.829 m (6')   Wt 105.6 kg (232 lb 12.9 oz)   SpO2 94%   BMI 31.57 kg/m²     In: -   Out: 1125   In: -   Out: 1125 [Urine:1125]    General appearance: Sleeping  HEENT: AT/NC, MMM  Neck: FROM, supple  Lungs: Diminished, room air  CV: Tachycardia, no MRGs  Vasc: Radial pulses 2+  Abdomen: Soft, non-tender; no masses or HSM  Extremities: No peripheral edema or digital cyanosis  Skin: no rash, lesions or ulcers  Psych: JEOVANY  Neuro: JEOVANY     Recent Labs     07/29/24  0149 07/30/24 0419 07/31/24 0418   WBC 5.3 10.2 12.9*   HGB 14.8 14.3 14.6   HCT 45.9 44.2 45.0   PLT  --  137 142         Recent Labs     07/29/24  0149 07/30/24 0419 07/31/24 0418    141 144   K 4.2 4.7 4.2    108* 109*   CO2 23 20* 19*   BUN 23 28* 29*   CREATININE 1.4* 1.3* 1.2   CALCIUM 9.4 9.3 9.5         Assessment:    Principal Problem:    Respiratory failure (HCC)  Resolved Problems:    * No resolved hospital problems. *      Plan:  Patient is a 79-year-old male with a hx of DVT on eliquis, HTN, Parkinson's disease who is admitted to [facility] for  Acute respiratory failure with hypoxia  -Monitor labs  -proBNP 273  -Check procalcitonin  -Respiratory panel negative  -IV Solu-Medrol 40 mg every 8 hours  -Continue scheduled breathing treatments  -Initially on 4 L nasal cannula, weaned down to room air satting 95% now  -Resume home medications     Hypertension/sinus tachycardia  -Continue home medications with parameters  -Continue to monitor Bps  -Continue to monitor through tonight due to elevated heart rate  -Has not gotten any medication since yesterday     Parkinson's disease  -Continue home Sinemet     HX DVT  -Continue home Eliquis    7/30/24  -D-dimer 501  -Continue home Eliquis  -Wean IV steroids to 40

## 2024-07-31 NOTE — PLAN OF CARE
Problem: Chronic Conditions and Co-morbidities  Goal: Patient's chronic conditions and co-morbidity symptoms are monitored and maintained or improved  7/30/2024 2324 by Ifrah Sánchez RN  Outcome: Progressing  7/30/2024 1014 by Carlos Hernandez RN  Outcome: Progressing  Flowsheets (Taken 7/30/2024 0900)  Care Plan - Patient's Chronic Conditions and Co-Morbidity Symptoms are Monitored and Maintained or Improved: Monitor and assess patient's chronic conditions and comorbid symptoms for stability, deterioration, or improvement     Problem: Discharge Planning  Goal: Discharge to home or other facility with appropriate resources  7/30/2024 2324 by Ifrah Sánchez RN  Outcome: Progressing  7/30/2024 1014 by Carlos Hernandez RN  Outcome: Progressing  Flowsheets (Taken 7/30/2024 0900)  Discharge to home or other facility with appropriate resources:   Identify barriers to discharge with patient and caregiver   Arrange for needed discharge resources and transportation as appropriate   Identify discharge learning needs (meds, wound care, etc)   Refer to discharge planning if patient needs post-hospital services based on physician order or complex needs related to functional status, cognitive ability or social support system     Problem: Skin/Tissue Integrity  Goal: Absence of new skin breakdown  Description: 1.  Monitor for areas of redness and/or skin breakdown  2.  Assess vascular access sites hourly  3.  Every 4-6 hours minimum:  Change oxygen saturation probe site  4.  Every 4-6 hours:  If on nasal continuous positive airway pressure, respiratory therapy assess nares and determine need for appliance change or resting period.  7/30/2024 2324 by Ifrah Sánchez, RN  Outcome: Progressing  7/30/2024 1014 by Carlos Hernandez RN  Outcome: Progressing     Problem: Safety - Adult  Goal: Free from fall injury  7/30/2024 2324 by Ifrah Sánchez, RN  Outcome: Progressing  7/30/2024 1014 by Carlos Hernandez RN  Outcome:

## 2024-07-31 NOTE — PLAN OF CARE
Problem: Chronic Conditions and Co-morbidities  Goal: Patient's chronic conditions and co-morbidity symptoms are monitored and maintained or improved  Outcome: Progressing     Problem: Discharge Planning  Goal: Discharge to home or other facility with appropriate resources  Outcome: Progressing     Problem: Skin/Tissue Integrity  Goal: Absence of new skin breakdown  Description: 1.  Monitor for areas of redness and/or skin breakdown  2.  Assess vascular access sites hourly  3.  Every 4-6 hours minimum:  Change oxygen saturation probe site  4.  Every 4-6 hours:  If on nasal continuous positive airway pressure, respiratory therapy assess nares and determine need for appliance change or resting period.  Outcome: Progressing

## 2024-07-31 NOTE — CARE COORDINATION
Patient refused CTA of the chest today, VQ scan ordered. Plan is for patient to return home with his wife, no needs reported and wife to transport. If home care is needed, patient's wife would like a referral made to [facility].    Case Management Assessment  Initial Evaluation    Date/Time of Evaluation: 7/31/2024 4:04 PM  Assessment Completed by: YANETH Liu    If patient is discharged prior to next notation, then this note serves as note for discharge by case management.    Patient Name: Frankie Hernández                   YOB: 1945  Diagnosis: Respiratory failure (HCC) [J96.90]                   Date / Time: 7/29/2024  1:38 AM    Patient Admission Status: Inpatient   Readmission Risk (Low < 19, Mod (19-27), High > 27): Readmission Risk Score: 11.5    Current PCP: Minor Cagle MD  PCP verified by CM? Yes    Chart Reviewed: Yes      History Provided by: Spouse, Medical Record  Patient Orientation: Alert and Oriented    Patient Cognition: Short Term Memory Deficit    Hospitalization in the last 30 days (Readmission):  No    If yes, Readmission Assessment in  Navigator will be completed.    Advance Directives:      Code Status: Full Code   Patient's Primary Decision Maker is: Legal Next of Kin    Primary Decision Maker: Nina Hernández - Spouse - [phone]    Discharge Planning:    Patient lives with: Spouse/Significant Other Type of Home: House  Primary Care Giver: Spouse  Patient Support Systems include: Spouse/Significant Other, Family Members   Current Financial resources:    Current community resources:    Current services prior to admission: Skilled Nursing Facility            Current DME:              Type of Home Care services:  OT, PT, Skilled Therapy    ADLS  Prior functional level: Independent in ADLs/IADLs  Current functional level: Independent in ADLs/IADLs    PT AM-PAC: 16 /24  OT AM-PAC: 19 /24    Family can provide assistance at DC: Yes  Would you like Case

## 2024-07-31 NOTE — PROGRESS NOTES
Physician Progress Note      PATIENT:               CLEVE SALAZAR  CSN #:                  [id number]  :                       1945  ADMIT DATE:       2024 1:38 AM  DISCH DATE:  RESPONDING  PROVIDER #:        Merly Reese MD          QUERY TEXT:    Patient admitted with documentation of acute respiratory failure in ED   impression and H/P on . In order to support the diagnosis of acute   respiratory failure, please include additional clinical indicators in your   documentation.  Or please document if the diagnosis of acute respiratory   failure has been ruled out after further study.    The medical record reflects the following:  Risk Factors:  Parkinsons  Clinical Indicators: family called EMS due to being \"unresponsive\" and sat was   noted to be 91% when they arrived, pt c/o shortness of breath; more alert and   oriented in triage, RR 10-16 with sats 93-98% with one sat at 89%;   intermittent confusion noted; faint wheezing on assessment but \"not in   respiratory distress\"; no work of breathing documented  Treatment: O2 at 4l weaned to 2l then RA    Thank you,  Xander RESTREPO, CCDS  [phone]    Acute Respiratory Failure Clinical Indicators per  MS-DRG Training Guide and   Quick Reference Guide:  pO2 < 60 mmHg or SpO2 (pulse oximetry) < 91% breathing room air  pCO2 > 50 and pH < 7.35  P/F ratio (pO2 / FIO2) < 300  pO2 decrease or pCO2 increase by 10 mmHg from baseline (if known)  Supplemental oxygen of 40% or more  Presence of respiratory distress, tachypnea, dyspnea, shortness of breath,   wheezing  Unable to speak in complete sentences  Use of accessory muscles to breathe  Extreme anxiety and feeling of impending doom  Tripod position  Confusion/altered mental status/obtunded  Options provided:  -- Acute Respiratory Failure as evidenced by, Please document evidence.  -- Acute Respiratory Failure ruled out after study  -- Other - I will add my own diagnosis  -- Disagree - Not applicable / Not

## 2024-07-31 NOTE — PROGRESS NOTES
Patient has concerns about having ct contrast, he has 1 kidney and his MD in [place] previously told him not to have contrast.Patient would like to speak with ordering doctor before having cta of chest. Please call when or if he will be rescheduled. Thank you.

## 2024-08-01 VITALS
HEIGHT: 72 IN | RESPIRATION RATE: 16 BRPM | DIASTOLIC BLOOD PRESSURE: 62 MMHG | TEMPERATURE: 98.6 F | SYSTOLIC BLOOD PRESSURE: 110 MMHG | HEART RATE: 70 BPM | OXYGEN SATURATION: 96 % | WEIGHT: 231.04 LBS | BODY MASS INDEX: 31.29 KG/M2

## 2024-08-01 LAB
ALBUMIN SERPL-MCNC: 3.8 G/DL (ref 3.5–5.2)
ALP SERPL-CCNC: 73 U/L (ref 40–129)
ALT SERPL-CCNC: <5 U/L (ref 0–40)
ANION GAP SERPL CALCULATED.3IONS-SCNC: 10 MMOL/L (ref 7–16)
AST SERPL-CCNC: 18 U/L (ref 0–39)
BASOPHILS # BLD: 0.02 K/UL (ref 0–0.2)
BASOPHILS NFR BLD: 0 % (ref 0–2)
BILIRUB DIRECT SERPL-MCNC: <0.2 MG/DL (ref 0–0.3)
BILIRUB INDIRECT SERPL-MCNC: ABNORMAL MG/DL (ref 0–1)
BILIRUB SERPL-MCNC: 0.5 MG/DL (ref 0–1.2)
BUN SERPL-MCNC: 28 MG/DL (ref 6–23)
CALCIUM SERPL-MCNC: 8.8 MG/DL (ref 8.6–10.2)
CHLORIDE SERPL-SCNC: 106 MMOL/L (ref 98–107)
CO2 SERPL-SCNC: 24 MMOL/L (ref 22–29)
CREAT SERPL-MCNC: 1.4 MG/DL (ref 0.7–1.2)
EOSINOPHIL # BLD: 0 K/UL (ref 0.05–0.5)
EOSINOPHILS RELATIVE PERCENT: 0 % (ref 0–6)
ERYTHROCYTE [DISTWIDTH] IN BLOOD BY AUTOMATED COUNT: 13.2 % (ref 11.5–15)
GFR, ESTIMATED: 51 ML/MIN/1.73M2
GLUCOSE BLD-MCNC: 138 MG/DL (ref 74–99)
GLUCOSE BLD-MCNC: 80 MG/DL (ref 74–99)
GLUCOSE SERPL-MCNC: 85 MG/DL (ref 74–99)
HCT VFR BLD AUTO: 42.2 % (ref 37–54)
HGB BLD-MCNC: 14.2 G/DL (ref 12.5–16.5)
IMM GRANULOCYTES # BLD AUTO: 0.04 K/UL (ref 0–0.58)
IMM GRANULOCYTES NFR BLD: 0 % (ref 0–5)
LYMPHOCYTES NFR BLD: 2.26 K/UL (ref 1.5–4)
LYMPHOCYTES RELATIVE PERCENT: 22 % (ref 20–42)
MCH RBC QN AUTO: 31.9 PG (ref 26–35)
MCHC RBC AUTO-ENTMCNC: 33.6 G/DL (ref 32–34.5)
MCV RBC AUTO: 94.8 FL (ref 80–99.9)
MONOCYTES NFR BLD: 0.97 K/UL (ref 0.1–0.95)
MONOCYTES NFR BLD: 9 % (ref 2–12)
NEUTROPHILS NFR BLD: 69 % (ref 43–80)
NEUTS SEG NFR BLD: 7.24 K/UL (ref 1.8–7.3)
PLATELET # BLD AUTO: 135 K/UL (ref 130–450)
PMV BLD AUTO: 10.4 FL (ref 7–12)
POTASSIUM SERPL-SCNC: 4.2 MMOL/L (ref 3.5–5)
PROT SERPL-MCNC: 6.2 G/DL (ref 6.4–8.3)
RBC # BLD AUTO: 4.45 M/UL (ref 3.8–5.8)
SODIUM SERPL-SCNC: 140 MMOL/L (ref 132–146)
WBC OTHER # BLD: 10.5 K/UL (ref 4.5–11.5)

## 2024-08-01 PROCEDURE — 85025 COMPLETE CBC W/AUTO DIFF WBC: CPT

## 2024-08-01 PROCEDURE — 82962 GLUCOSE BLOOD TEST: CPT

## 2024-08-01 PROCEDURE — 6370000000 HC RX 637 (ALT 250 FOR IP): Performed by: INTERNAL MEDICINE

## 2024-08-01 PROCEDURE — 6370000000 HC RX 637 (ALT 250 FOR IP): Performed by: NURSE PRACTITIONER

## 2024-08-01 PROCEDURE — 82248 BILIRUBIN DIRECT: CPT

## 2024-08-01 PROCEDURE — 2580000003 HC RX 258: Performed by: NURSE PRACTITIONER

## 2024-08-01 PROCEDURE — 80053 COMPREHEN METABOLIC PANEL: CPT

## 2024-08-01 PROCEDURE — 36415 COLL VENOUS BLD VENIPUNCTURE: CPT

## 2024-08-01 RX ORDER — METOPROLOL SUCCINATE 50 MG/1
50 TABLET, EXTENDED RELEASE ORAL DAILY
Qty: 30 TABLET | Refills: 3 | Status: SHIPPED | OUTPATIENT
Start: 2024-08-02 | End: 2024-08-01

## 2024-08-01 RX ORDER — AMLODIPINE BESYLATE 2.5 MG/1
2.5 TABLET ORAL DAILY
Qty: 30 TABLET | Refills: 3 | Status: SHIPPED | OUTPATIENT
Start: 2024-08-02 | End: 2024-08-01

## 2024-08-01 RX ORDER — METOPROLOL SUCCINATE 50 MG/1
50 TABLET, EXTENDED RELEASE ORAL DAILY
Qty: 30 TABLET | Refills: 0 | Status: SHIPPED | OUTPATIENT
Start: 2024-08-02

## 2024-08-01 RX ORDER — AMLODIPINE BESYLATE 2.5 MG/1
2.5 TABLET ORAL DAILY
Qty: 30 TABLET | Refills: 0 | Status: SHIPPED | OUTPATIENT
Start: 2024-08-02

## 2024-08-01 RX ADMIN — APIXABAN 5 MG: 5 TABLET, FILM COATED ORAL at 08:50

## 2024-08-01 RX ADMIN — LISINOPRIL 20 MG: 10 TABLET ORAL at 08:49

## 2024-08-01 RX ADMIN — AMLODIPINE BESYLATE 2.5 MG: 5 TABLET ORAL at 08:50

## 2024-08-01 RX ADMIN — RASAGILINE 1 MG: 1 TABLET ORAL at 08:49

## 2024-08-01 RX ADMIN — SODIUM CHLORIDE, PRESERVATIVE FREE 10 ML: 5 INJECTION INTRAVENOUS at 08:48

## 2024-08-01 RX ADMIN — CARBIDOPA AND LEVODOPA 2 TABLET: 25; 100 TABLET ORAL at 08:49

## 2024-08-01 RX ADMIN — CARBIDOPA AND LEVODOPA 1 TABLET: 25; 100 TABLET ORAL at 12:01

## 2024-08-01 RX ADMIN — GABAPENTIN 600 MG: 300 CAPSULE ORAL at 08:51

## 2024-08-01 RX ADMIN — ATORVASTATIN CALCIUM 10 MG: 10 TABLET, FILM COATED ORAL at 08:49

## 2024-08-01 RX ADMIN — METOPROLOL SUCCINATE 50 MG: 50 TABLET, EXTENDED RELEASE ORAL at 08:49

## 2024-08-01 NOTE — DISCHARGE INSTRUCTIONS
Check BP's before taking bp meds and log findings, check bp again 1 hour after medications and again before bed. Log these findings and take to PCP at your follow up    If systolic BP less than 100 when checking in the AM, hold BP medications.

## 2024-08-01 NOTE — DISCHARGE SUMMARY
[facility]   Discharge summary   Patient ID:  Frankie Hernández  [id number]  79 y.o.  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 male of Minor Cagle MD     Patient is a 79-year-old male with a hx of DVT on eliquis, HTN, Parkinson's disease who is admitted to [facility] for  Acute respiratory failure with hypoxia  -Monitor labs  -proBNP 273  -Check procalcitonin  -Respiratory panel negative  -IV Solu-Medrol 40 mg every 8 hours  -Continue scheduled breathing treatments  -Initially on 4 L nasal cannula, weaned down to room air satting 95% now  -Resume home medications     Hypertension/sinus tachycardia  -Continue home medications with parameters  -Continue to monitor Bps  -Continue to monitor through tonight due to elevated heart rate  -Has not gotten any medication since yesterday     Parkinson's disease  -Continue home Sinemet     HX DVT  -Continue home Eliquis     7/30/24  -D-dimer 501  -Continue home Eliquis  -Wean IV steroids to 40 daily  -Started on p.o. metoprolol XL 25 mg daily  -Tachycardia remains about the same however BP  evidence of hydrocephalus. Mild atrophy and periventricular white matter degenerative change. ORBITS: The visualized portion of the orbits demonstrate no acute abnormality. SINUSES: The visualized paranasal sinuses and mastoid air cells demonstrate no acute abnormality. SOFT TISSUES/SKULL:  No acute abnormality of the visualized skull or soft tissues.     No acute intracranial abnormality.     XR CHEST PORTABLE    Result Date: 7/29/2024  EXAMINATION: ONE XRAY VIEW OF THE CHEST 7/29/2024 3:04 am COMPARISON: June 12, 2024 HISTORY: ORDERING SYSTEM PROVIDED HISTORY: ams TECHNOLOGIST PROVIDED HISTORY: Reason for exam:->ams FINDINGS: Mild cardiomegaly.  Lungs clear.  No pneumothorax or effusion. Body wall soft tissues unremarkable. Osseous thorax intact.Cervical ACDF partially visible.     1. No acute cardiopulmonary process. 2. Mild cardiomegaly.       Discharge Exam:    HEENT: NCAT,  PERRLA, No JVD  Heart:  RRR, no murmurs, gallops, or rubs.  Lungs:  CTA bilaterally, no wheeze, rales or rhonchi  Abd: bowel sounds present, nontender, nondistended, no masses  Extrem:  No clubbing, cyanosis, or edema    Disposition: home     Patient Condition at Discharge: Stable     Patient Instructions:      Medication List        START taking these medications      amLODIPine 2.5 MG tablet  Commonly known as: NORVASC  Take 1 tablet by mouth daily  Start taking on: August 2, 2024     ipratropium 0.5 mg-albuterol 2.5 mg 0.5-2.5 (3) MG/3ML Soln nebulizer solution  Commonly known as: DUONEB  Inhale 3 mLs into the lungs every 4 hours as needed for Shortness of Breath     metoprolol succinate 50 MG extended release tablet  Commonly known as: TOPROL XL  Take 1 tablet by mouth daily  Start taking on: August 2, 2024     predniSONE 20 MG tablet  Commonly known as: DELTASONE  Take 2 tablets by mouth daily for 5 days            CONTINUE taking these medications      atorvastatin 10 MG tablet  Commonly known as: LIPITOR     * carbidopa-levodopa

## 2024-08-01 NOTE — PLAN OF CARE
Problem: Chronic Conditions and Co-morbidities  Goal: Patient's chronic conditions and co-morbidity symptoms are monitored and maintained or improved  7/31/2024 2233 by Ifrah Sánchez RN  Outcome: Progressing  7/31/2024 1515 by Tad Orozco RN  Outcome: Progressing     Problem: Discharge Planning  Goal: Discharge to home or other facility with appropriate resources  7/31/2024 2233 by Ifrah Sánchez RN  Outcome: Progressing  7/31/2024 1515 by Tad Orozco RN  Outcome: Progressing     Problem: Skin/Tissue Integrity  Goal: Absence of new skin breakdown  Description: 1.  Monitor for areas of redness and/or skin breakdown  2.  Assess vascular access sites hourly  3.  Every 4-6 hours minimum:  Change oxygen saturation probe site  4.  Every 4-6 hours:  If on nasal continuous positive airway pressure, respiratory therapy assess nares and determine need for appliance change or resting period.  7/31/2024 2233 by Ifrah Sánchez RN  Outcome: Progressing  7/31/2024 1515 by Tad Orozco RN  Outcome: Progressing     Problem: Safety - Adult  Goal: Free from fall injury  Outcome: Progressing  Flowsheets (Taken 7/31/2024 0959 by Tad Orozco, RN)  Free From Fall Injury: Instruct family/caregiver on patient safety     Problem: ABCDS Injury Assessment  Goal: Absence of physical injury  Outcome: Progressing     Problem: Pain  Goal: Verbalizes/displays adequate comfort level or baseline comfort level  Outcome: Progressing

## 2024-08-03 LAB
MICROORGANISM SPEC CULT: NORMAL
SERVICE CMNT-IMP: NORMAL
SPECIMEN DESCRIPTION: NORMAL

## 2024-08-04 LAB
MICROORGANISM SPEC CULT: NORMAL
SERVICE CMNT-IMP: NORMAL
SPECIMEN DESCRIPTION: NORMAL

## 2024-08-08 NOTE — PROGRESS NOTES
Physician Progress Note      PATIENT:               CLEVE SALAZAR  CSN #:                  [id number]  :                       1945  ADMIT DATE:       2024 1:38 AM  DISCH DATE:        2024 12:51 PM  RESPONDING  PROVIDER #:        Merly Reese MD          QUERY TEXT:    Patient admitted with documentation of acute respiratory failure.    Documentation later reflects \"acute respiratory failure has been ruled out\"   with diagnosis reappearing in dc summary.  If possible, please document in   progress notes and discharge summary if you are evaluating and /or treating   any of the following:    The medical record reflects the following:  Risk Factors: Parkinsons  Clinical Indicators: family called EMS due to pt being \"unresponsive\"; sat 91%   when EMS arrived with his c/o shortness of breath; more alert and oriented in   triage with RR 10-16 and sats 93-98% and one noted 89% sat; intermittent   confusion, faint wheezing but \"not in respiratory distress\"; no work of   breathing or labored respirations documented  Treatment: O2 at 4l weaned to 2l then RA    Thank you,  Xander RN, CCDS  [phone]  Options provided:  -- Acute respiratory failure confirmed  -- Acute respiratory failure  ruled out  -- Other - I will add my own diagnosis  -- Disagree - Not applicable / Not valid  -- Disagree - Clinically unable to determine / Unknown  -- Refer to Clinical Documentation Reviewer    PROVIDER RESPONSE TEXT:    After study, acute respiratory failure confirmed.    Query created by: Xander Calles on 2024 7:07 AM      Electronically signed by:  Merly Reese MD 2024 9:18 PM

## 2024-10-04 ENCOUNTER — APPOINTMENT (OUTPATIENT)
Dept: GENERAL RADIOLOGY | Age: 79
DRG: 057 | End: 2024-10-04
Payer: MEDICARE

## 2024-10-04 ENCOUNTER — HOSPITAL ENCOUNTER (INPATIENT)
Age: 79
LOS: 2 days | Discharge: HOME HEALTH | DRG: 057 | End: 2024-10-06
Attending: EMERGENCY MEDICINE | Admitting: INTERNAL MEDICINE
Payer: MEDICARE

## 2024-10-04 ENCOUNTER — APPOINTMENT (OUTPATIENT)
Dept: CT IMAGING | Age: 79
DRG: 057 | End: 2024-10-04
Payer: MEDICARE

## 2024-10-04 DIAGNOSIS — G20.A1 PARKINSON'S DISEASE, UNSPECIFIED WHETHER DYSKINESIA PRESENT, UNSPECIFIED WHETHER MANIFESTATIONS FLUCTUATE (HCC): ICD-10-CM

## 2024-10-04 DIAGNOSIS — R19.7 DIARRHEA, UNSPECIFIED TYPE: ICD-10-CM

## 2024-10-04 DIAGNOSIS — I95.9 HYPOTENSION, UNSPECIFIED HYPOTENSION TYPE: ICD-10-CM

## 2024-10-04 DIAGNOSIS — R55 SYNCOPE AND COLLAPSE: Primary | ICD-10-CM

## 2024-10-04 DIAGNOSIS — R10.84 GENERALIZED ABDOMINAL PAIN: ICD-10-CM

## 2024-10-04 LAB
ALBUMIN SERPL-MCNC: 4.1 G/DL (ref 3.5–5.2)
ALP SERPL-CCNC: 83 U/L (ref 40–129)
ALT SERPL-CCNC: <5 U/L (ref 0–40)
ANION GAP SERPL CALCULATED.3IONS-SCNC: 12 MMOL/L (ref 7–16)
AST SERPL-CCNC: 15 U/L (ref 0–39)
B.E.: -7.3 MMOL/L (ref -3–3)
BASOPHILS # BLD: 0.03 K/UL (ref 0–0.2)
BASOPHILS NFR BLD: 1 % (ref 0–2)
BILIRUB SERPL-MCNC: 0.8 MG/DL (ref 0–1.2)
BILIRUB UR QL STRIP: NEGATIVE
BUN SERPL-MCNC: 24 MG/DL (ref 6–23)
CALCIUM SERPL-MCNC: 9.1 MG/DL (ref 8.6–10.2)
CHLORIDE SERPL-SCNC: 107 MMOL/L (ref 98–107)
CLARITY UR: CLEAR
CO2 SERPL-SCNC: 21 MMOL/L (ref 22–29)
COHB: 0.9 % (ref 0–1.5)
COLOR UR: YELLOW
CREAT SERPL-MCNC: 1.4 MG/DL (ref 0.7–1.2)
CRITICAL: ABNORMAL
DATE ANALYZED: ABNORMAL
DATE OF COLLECTION: ABNORMAL
EOSINOPHIL # BLD: 0.05 K/UL (ref 0.05–0.5)
EOSINOPHILS RELATIVE PERCENT: 1 % (ref 0–6)
ERYTHROCYTE [DISTWIDTH] IN BLOOD BY AUTOMATED COUNT: 13.6 % (ref 11.5–15)
GFR, ESTIMATED: 52 ML/MIN/1.73M2
GLUCOSE SERPL-MCNC: 208 MG/DL (ref 74–99)
GLUCOSE UR STRIP-MCNC: NEGATIVE MG/DL
HCO3: 16.9 MMOL/L (ref 22–26)
HCT VFR BLD AUTO: 45.4 % (ref 37–54)
HGB BLD-MCNC: 14.6 G/DL (ref 12.5–16.5)
HGB UR QL STRIP.AUTO: NEGATIVE
HHB: 2.4 % (ref 0–5)
IMM GRANULOCYTES # BLD AUTO: 0.03 K/UL (ref 0–0.58)
IMM GRANULOCYTES NFR BLD: 1 % (ref 0–5)
INR PPP: 1.3
KETONES UR STRIP-MCNC: NEGATIVE MG/DL
LAB: ABNORMAL
LACTATE BLDV-SCNC: 2.3 MMOL/L (ref 0.5–2.2)
LEUKOCYTE ESTERASE UR QL STRIP: NEGATIVE
LIPASE SERPL-CCNC: 36 U/L (ref 13–60)
LYMPHOCYTES NFR BLD: 2.13 K/UL (ref 1.5–4)
LYMPHOCYTES RELATIVE PERCENT: 32 % (ref 20–42)
Lab: 1900
MCH RBC QN AUTO: 31.5 PG (ref 26–35)
MCHC RBC AUTO-ENTMCNC: 32.2 G/DL (ref 32–34.5)
MCV RBC AUTO: 97.8 FL (ref 80–99.9)
METHB: 0.3 % (ref 0–1.5)
MODE: ABNORMAL
MONOCYTES NFR BLD: 0.52 K/UL (ref 0.1–0.95)
MONOCYTES NFR BLD: 8 % (ref 2–12)
NEUTROPHILS NFR BLD: 58 % (ref 43–80)
NEUTS SEG NFR BLD: 3.87 K/UL (ref 1.8–7.3)
NITRITE UR QL STRIP: NEGATIVE
O2 CONTENT: 19.1 ML/DL
O2 SATURATION: 97.6 % (ref 92–98.5)
O2HB: 96.4 % (ref 94–97)
OPERATOR ID: 1661
PARTIAL THROMBOPLASTIN TIME: 28.5 SEC (ref 24.5–35.1)
PATIENT TEMP: 37 C
PCO2: 31 MMHG (ref 35–45)
PH BLOOD GAS: 7.36 (ref 7.35–7.45)
PH UR STRIP: 6.5 [PH] (ref 5–9)
PLATELET # BLD AUTO: 141 K/UL (ref 130–450)
PMV BLD AUTO: 9.6 FL (ref 7–12)
PO2: 100.9 MMHG (ref 75–100)
POTASSIUM SERPL-SCNC: 4.5 MMOL/L (ref 3.5–5)
PROT SERPL-MCNC: 6.3 G/DL (ref 6.4–8.3)
PROT UR STRIP-MCNC: 100 MG/DL
PROTHROMBIN TIME: 13.4 SEC (ref 9.3–12.4)
RBC # BLD AUTO: 4.64 M/UL (ref 3.8–5.8)
RBC #/AREA URNS HPF: ABNORMAL /HPF
SODIUM SERPL-SCNC: 140 MMOL/L (ref 132–146)
SOURCE, BLOOD GAS: ABNORMAL
SP GR UR STRIP: 1.01 (ref 1–1.03)
THB: 14 G/DL (ref 11.5–16.5)
TIME ANALYZED: 1914
TROPONIN I SERPL HS-MCNC: 23 NG/L (ref 0–11)
TROPONIN I SERPL HS-MCNC: 25 NG/L (ref 0–11)
UROBILINOGEN UR STRIP-ACNC: 1 EU/DL (ref 0–1)
WBC #/AREA URNS HPF: ABNORMAL /HPF
WBC OTHER # BLD: 6.6 K/UL (ref 4.5–11.5)

## 2024-10-04 PROCEDURE — 85610 PROTHROMBIN TIME: CPT

## 2024-10-04 PROCEDURE — 71045 X-RAY EXAM CHEST 1 VIEW: CPT

## 2024-10-04 PROCEDURE — G0378 HOSPITAL OBSERVATION PER HR: HCPCS

## 2024-10-04 PROCEDURE — 74174 CTA ABD&PLVS W/CONTRAST: CPT

## 2024-10-04 PROCEDURE — 87086 URINE CULTURE/COLONY COUNT: CPT

## 2024-10-04 PROCEDURE — 87449 NOS EACH ORGANISM AG IA: CPT

## 2024-10-04 PROCEDURE — 96361 HYDRATE IV INFUSION ADD-ON: CPT

## 2024-10-04 PROCEDURE — 83690 ASSAY OF LIPASE: CPT

## 2024-10-04 PROCEDURE — 81001 URINALYSIS AUTO W/SCOPE: CPT

## 2024-10-04 PROCEDURE — 83605 ASSAY OF LACTIC ACID: CPT

## 2024-10-04 PROCEDURE — 87324 CLOSTRIDIUM AG IA: CPT

## 2024-10-04 PROCEDURE — 94640 AIRWAY INHALATION TREATMENT: CPT

## 2024-10-04 PROCEDURE — 2580000003 HC RX 258: Performed by: EMERGENCY MEDICINE

## 2024-10-04 PROCEDURE — 80053 COMPREHEN METABOLIC PANEL: CPT

## 2024-10-04 PROCEDURE — 6360000004 HC RX CONTRAST MEDICATION: Performed by: RADIOLOGY

## 2024-10-04 PROCEDURE — 87046 STOOL CULTR AEROBIC BACT EA: CPT

## 2024-10-04 PROCEDURE — 6360000002 HC RX W HCPCS

## 2024-10-04 PROCEDURE — 84484 ASSAY OF TROPONIN QUANT: CPT

## 2024-10-04 PROCEDURE — 96374 THER/PROPH/DIAG INJ IV PUSH: CPT

## 2024-10-04 PROCEDURE — 93005 ELECTROCARDIOGRAM TRACING: CPT

## 2024-10-04 PROCEDURE — 2700000000 HC OXYGEN THERAPY PER DAY

## 2024-10-04 PROCEDURE — 87040 BLOOD CULTURE FOR BACTERIA: CPT

## 2024-10-04 PROCEDURE — 71275 CT ANGIOGRAPHY CHEST: CPT

## 2024-10-04 PROCEDURE — 87329 GIARDIA AG IA: CPT

## 2024-10-04 PROCEDURE — 2060000000 HC ICU INTERMEDIATE R&B

## 2024-10-04 PROCEDURE — 87077 CULTURE AEROBIC IDENTIFY: CPT

## 2024-10-04 PROCEDURE — 87045 FECES CULTURE AEROBIC BACT: CPT

## 2024-10-04 PROCEDURE — 85730 THROMBOPLASTIN TIME PARTIAL: CPT

## 2024-10-04 PROCEDURE — 82805 BLOOD GASES W/O2 SATURATION: CPT

## 2024-10-04 PROCEDURE — 2580000003 HC RX 258

## 2024-10-04 PROCEDURE — 87427 SHIGA-LIKE TOXIN AG IA: CPT

## 2024-10-04 PROCEDURE — 85025 COMPLETE CBC W/AUTO DIFF WBC: CPT

## 2024-10-04 PROCEDURE — 6370000000 HC RX 637 (ALT 250 FOR IP)

## 2024-10-04 PROCEDURE — 99285 EMERGENCY DEPT VISIT HI MDM: CPT

## 2024-10-04 PROCEDURE — 87425 ROTAVIRUS AG IA: CPT

## 2024-10-04 RX ORDER — GABAPENTIN 300 MG/1
600 CAPSULE ORAL 3 TIMES DAILY
Status: DISCONTINUED | OUTPATIENT
Start: 2024-10-04 | End: 2024-10-06 | Stop reason: HOSPADM

## 2024-10-04 RX ORDER — METOPROLOL SUCCINATE 50 MG/1
50 TABLET, EXTENDED RELEASE ORAL DAILY
Status: DISCONTINUED | OUTPATIENT
Start: 2024-10-05 | End: 2024-10-05

## 2024-10-04 RX ORDER — ACETAMINOPHEN 650 MG/1
650 SUPPOSITORY RECTAL EVERY 6 HOURS PRN
Status: DISCONTINUED | OUTPATIENT
Start: 2024-10-04 | End: 2024-10-06 | Stop reason: HOSPADM

## 2024-10-04 RX ORDER — FENTANYL CITRATE 50 UG/ML
25 INJECTION, SOLUTION INTRAMUSCULAR; INTRAVENOUS ONCE
Status: COMPLETED | OUTPATIENT
Start: 2024-10-04 | End: 2024-10-04

## 2024-10-04 RX ORDER — SODIUM CHLORIDE 0.9 % (FLUSH) 0.9 %
10 SYRINGE (ML) INJECTION PRN
Status: DISCONTINUED | OUTPATIENT
Start: 2024-10-04 | End: 2024-10-06 | Stop reason: HOSPADM

## 2024-10-04 RX ORDER — AMLODIPINE BESYLATE 5 MG/1
2.5 TABLET ORAL DAILY
Status: DISCONTINUED | OUTPATIENT
Start: 2024-10-05 | End: 2024-10-06

## 2024-10-04 RX ORDER — SODIUM CHLORIDE 9 MG/ML
INJECTION, SOLUTION INTRAVENOUS PRN
Status: DISCONTINUED | OUTPATIENT
Start: 2024-10-04 | End: 2024-10-06 | Stop reason: HOSPADM

## 2024-10-04 RX ORDER — MAGNESIUM SULFATE IN WATER 40 MG/ML
2000 INJECTION, SOLUTION INTRAVENOUS PRN
Status: DISCONTINUED | OUTPATIENT
Start: 2024-10-04 | End: 2024-10-06 | Stop reason: HOSPADM

## 2024-10-04 RX ORDER — POTASSIUM CHLORIDE 1500 MG/1
40 TABLET, EXTENDED RELEASE ORAL PRN
Status: DISCONTINUED | OUTPATIENT
Start: 2024-10-04 | End: 2024-10-06 | Stop reason: HOSPADM

## 2024-10-04 RX ORDER — GLIPIZIDE 5 MG/1
5 TABLET ORAL NIGHTLY
Status: DISCONTINUED | OUTPATIENT
Start: 2024-10-05 | End: 2024-10-05

## 2024-10-04 RX ORDER — 0.9 % SODIUM CHLORIDE 0.9 %
1000 INTRAVENOUS SOLUTION INTRAVENOUS ONCE
Status: COMPLETED | OUTPATIENT
Start: 2024-10-04 | End: 2024-10-04

## 2024-10-04 RX ORDER — ONDANSETRON 4 MG/1
4 TABLET, ORALLY DISINTEGRATING ORAL EVERY 8 HOURS PRN
Status: DISCONTINUED | OUTPATIENT
Start: 2024-10-04 | End: 2024-10-04 | Stop reason: CLARIF

## 2024-10-04 RX ORDER — ATORVASTATIN CALCIUM 10 MG/1
10 TABLET, FILM COATED ORAL DAILY
Status: DISCONTINUED | OUTPATIENT
Start: 2024-10-05 | End: 2024-10-06 | Stop reason: HOSPADM

## 2024-10-04 RX ORDER — SENNOSIDES A AND B 8.6 MG/1
1 TABLET, FILM COATED ORAL DAILY PRN
Status: DISCONTINUED | OUTPATIENT
Start: 2024-10-04 | End: 2024-10-06 | Stop reason: HOSPADM

## 2024-10-04 RX ORDER — SODIUM CHLORIDE 9 MG/ML
INJECTION, SOLUTION INTRAVENOUS CONTINUOUS
Status: DISCONTINUED | OUTPATIENT
Start: 2024-10-04 | End: 2024-10-06

## 2024-10-04 RX ORDER — RASAGILINE 1 MG/1
1 TABLET ORAL DAILY
Status: DISCONTINUED | OUTPATIENT
Start: 2024-10-05 | End: 2024-10-06 | Stop reason: HOSPADM

## 2024-10-04 RX ORDER — PROCHLORPERAZINE MALEATE 10 MG
10 TABLET ORAL EVERY 8 HOURS PRN
Status: DISCONTINUED | OUTPATIENT
Start: 2024-10-04 | End: 2024-10-06 | Stop reason: HOSPADM

## 2024-10-04 RX ORDER — IPRATROPIUM BROMIDE AND ALBUTEROL SULFATE 2.5; .5 MG/3ML; MG/3ML
3 SOLUTION RESPIRATORY (INHALATION) ONCE
Status: COMPLETED | OUTPATIENT
Start: 2024-10-04 | End: 2024-10-04

## 2024-10-04 RX ORDER — CARBIDOPA AND LEVODOPA 25; 100 MG/1; MG/1
2 TABLET ORAL 2 TIMES DAILY
Status: DISCONTINUED | OUTPATIENT
Start: 2024-10-05 | End: 2024-10-06 | Stop reason: HOSPADM

## 2024-10-04 RX ORDER — PROCHLORPERAZINE EDISYLATE 5 MG/ML
10 INJECTION INTRAMUSCULAR; INTRAVENOUS EVERY 6 HOURS PRN
Status: DISCONTINUED | OUTPATIENT
Start: 2024-10-04 | End: 2024-10-06 | Stop reason: HOSPADM

## 2024-10-04 RX ORDER — ONDANSETRON 2 MG/ML
4 INJECTION INTRAMUSCULAR; INTRAVENOUS EVERY 6 HOURS PRN
Status: DISCONTINUED | OUTPATIENT
Start: 2024-10-04 | End: 2024-10-04 | Stop reason: CLARIF

## 2024-10-04 RX ORDER — ACETAMINOPHEN 325 MG/1
650 TABLET ORAL EVERY 6 HOURS PRN
Status: DISCONTINUED | OUTPATIENT
Start: 2024-10-04 | End: 2024-10-06 | Stop reason: HOSPADM

## 2024-10-04 RX ORDER — CARBIDOPA AND LEVODOPA 25; 100 MG/1; MG/1
1 TABLET ORAL 2 TIMES DAILY
Status: DISCONTINUED | OUTPATIENT
Start: 2024-10-05 | End: 2024-10-06 | Stop reason: HOSPADM

## 2024-10-04 RX ORDER — IPRATROPIUM BROMIDE AND ALBUTEROL SULFATE 2.5; .5 MG/3ML; MG/3ML
1 SOLUTION RESPIRATORY (INHALATION) EVERY 4 HOURS PRN
Status: DISCONTINUED | OUTPATIENT
Start: 2024-10-04 | End: 2024-10-06 | Stop reason: HOSPADM

## 2024-10-04 RX ORDER — POTASSIUM CHLORIDE 7.45 MG/ML
10 INJECTION INTRAVENOUS PRN
Status: DISCONTINUED | OUTPATIENT
Start: 2024-10-04 | End: 2024-10-06 | Stop reason: HOSPADM

## 2024-10-04 RX ORDER — SODIUM CHLORIDE 0.9 % (FLUSH) 0.9 %
10 SYRINGE (ML) INJECTION EVERY 12 HOURS SCHEDULED
Status: DISCONTINUED | OUTPATIENT
Start: 2024-10-04 | End: 2024-10-06 | Stop reason: HOSPADM

## 2024-10-04 RX ORDER — IOPAMIDOL 755 MG/ML
75 INJECTION, SOLUTION INTRAVASCULAR
Status: COMPLETED | OUTPATIENT
Start: 2024-10-04 | End: 2024-10-04

## 2024-10-04 RX ADMIN — FENTANYL CITRATE 25 MCG: 50 INJECTION INTRAMUSCULAR; INTRAVENOUS at 21:10

## 2024-10-04 RX ADMIN — SODIUM CHLORIDE 1000 ML: 9 INJECTION, SOLUTION INTRAVENOUS at 19:21

## 2024-10-04 RX ADMIN — SODIUM CHLORIDE 1000 ML: 9 INJECTION, SOLUTION INTRAVENOUS at 18:49

## 2024-10-04 RX ADMIN — SODIUM CHLORIDE 1000 ML: 9 INJECTION, SOLUTION INTRAVENOUS at 19:20

## 2024-10-04 RX ADMIN — IPRATROPIUM BROMIDE AND ALBUTEROL SULFATE 3 DOSE: 2.5; .5 SOLUTION RESPIRATORY (INHALATION) at 19:25

## 2024-10-04 RX ADMIN — IOPAMIDOL 75 ML: 755 INJECTION, SOLUTION INTRAVENOUS at 18:43

## 2024-10-04 ASSESSMENT — PAIN - FUNCTIONAL ASSESSMENT: PAIN_FUNCTIONAL_ASSESSMENT: ACTIVITIES ARE NOT PREVENTED

## 2024-10-04 ASSESSMENT — PAIN DESCRIPTION - LOCATION: LOCATION: ABDOMEN

## 2024-10-04 ASSESSMENT — PAIN SCALES - GENERAL: PAINLEVEL_OUTOF10: 9

## 2024-10-04 NOTE — ED NOTES
Radiology Procedure Waiver   Name: Frankie Hernández  : 1945  MRN: [id number]    Date:  10/4/24    Time: 6:28 PM EDT    Benefits of immediately proceeding with radiology exam(s) without pre-testing outweigh the risks or are not indicated as specified below and therefore the following is/are being waived:    [x] Benefits of immediate radiology exam(s) outweigh any risk.                                               OR    Pre-exam testing is not indicated for the following reason(s):  [] Pregnancy test   [] Patients LMP on-time and regular.   [] Patient had Tubal Ligation or has other Contraception Device.   [] Patient  is Menopausal or Premenarcheal.    [] Patient had Full or Partial Hysterectomy.    [] Protocol for CT contrast allegry   [] Patient has tolerated well previously   [] Patient does not have a true allergy    [] MRI Questionnaire     [] BUN/Creatinine   [] Patient age w/no hx of renal dysfunction.   [] Patient on Dialysis.   [] Recent Normal Labs.  Electronically signed by Michael Peterson DO on 10/4/24 at 6:28 PM EDT

## 2024-10-05 LAB
ALBUMIN SERPL-MCNC: 3.2 G/DL (ref 3.5–5.2)
ALP SERPL-CCNC: 58 U/L (ref 40–129)
ALT SERPL-CCNC: <5 U/L (ref 0–40)
ANION GAP SERPL CALCULATED.3IONS-SCNC: 9 MMOL/L (ref 7–16)
AST SERPL-CCNC: 13 U/L (ref 0–39)
BASOPHILS # BLD: 0.03 K/UL (ref 0–0.2)
BASOPHILS NFR BLD: 0 % (ref 0–2)
BILIRUB SERPL-MCNC: 0.5 MG/DL (ref 0–1.2)
BUN SERPL-MCNC: 28 MG/DL (ref 6–23)
C DIFF GDH + TOXINS A+B STL QL IA.RAPID: NEGATIVE
CALCIUM SERPL-MCNC: 8.1 MG/DL (ref 8.6–10.2)
CHLORIDE SERPL-SCNC: 116 MMOL/L (ref 98–107)
CO2 SERPL-SCNC: 17 MMOL/L (ref 22–29)
CREAT SERPL-MCNC: 1.5 MG/DL (ref 0.7–1.2)
EOSINOPHIL # BLD: 0.07 K/UL (ref 0.05–0.5)
EOSINOPHILS RELATIVE PERCENT: 0 % (ref 0–6)
ERYTHROCYTE [DISTWIDTH] IN BLOOD BY AUTOMATED COUNT: 13.9 % (ref 11.5–15)
GFR, ESTIMATED: 47 ML/MIN/1.73M2
GLUCOSE BLD-MCNC: 122 MG/DL (ref 74–99)
GLUCOSE BLD-MCNC: 177 MG/DL (ref 74–99)
GLUCOSE BLD-MCNC: 208 MG/DL (ref 74–99)
GLUCOSE SERPL-MCNC: 212 MG/DL (ref 74–99)
HBA1C MFR BLD: 6.8 % (ref 4–5.6)
HCT VFR BLD AUTO: 43.4 % (ref 37–54)
HGB BLD-MCNC: 13.6 G/DL (ref 12.5–16.5)
IMM GRANULOCYTES # BLD AUTO: 0.14 K/UL (ref 0–0.58)
IMM GRANULOCYTES NFR BLD: 1 % (ref 0–5)
LYMPHOCYTES NFR BLD: 0.83 K/UL (ref 1.5–4)
LYMPHOCYTES RELATIVE PERCENT: 5 % (ref 20–42)
MCH RBC QN AUTO: 31.3 PG (ref 26–35)
MCHC RBC AUTO-ENTMCNC: 31.3 G/DL (ref 32–34.5)
MCV RBC AUTO: 100 FL (ref 80–99.9)
MONOCYTES NFR BLD: 1.2 K/UL (ref 0.1–0.95)
MONOCYTES NFR BLD: 7 % (ref 2–12)
NEUTROPHILS NFR BLD: 87 % (ref 43–80)
NEUTS SEG NFR BLD: 15.37 K/UL (ref 1.8–7.3)
PLATELET # BLD AUTO: 110 K/UL (ref 130–450)
PMV BLD AUTO: 9.9 FL (ref 7–12)
POTASSIUM SERPL-SCNC: 4 MMOL/L (ref 3.5–5)
PROT SERPL-MCNC: 5.3 G/DL (ref 6.4–8.3)
RBC # BLD AUTO: 4.34 M/UL (ref 3.8–5.8)
ROTAVIRUS ANTIGEN: NEGATIVE
SODIUM SERPL-SCNC: 142 MMOL/L (ref 132–146)
SOURCE, 60200063: NORMAL
SPECIMEN DESCRIPTION: NORMAL
WBC OTHER # BLD: 17.6 K/UL (ref 4.5–11.5)

## 2024-10-05 PROCEDURE — 6370000000 HC RX 637 (ALT 250 FOR IP): Performed by: INTERNAL MEDICINE

## 2024-10-05 PROCEDURE — 80053 COMPREHEN METABOLIC PANEL: CPT

## 2024-10-05 PROCEDURE — 2060000000 HC ICU INTERMEDIATE R&B

## 2024-10-05 PROCEDURE — 2580000003 HC RX 258: Performed by: NURSE PRACTITIONER

## 2024-10-05 PROCEDURE — 85025 COMPLETE CBC W/AUTO DIFF WBC: CPT

## 2024-10-05 PROCEDURE — 6370000000 HC RX 637 (ALT 250 FOR IP): Performed by: NURSE PRACTITIONER

## 2024-10-05 PROCEDURE — 6370000000 HC RX 637 (ALT 250 FOR IP): Performed by: HOSPITALIST

## 2024-10-05 PROCEDURE — 83036 HEMOGLOBIN GLYCOSYLATED A1C: CPT

## 2024-10-05 PROCEDURE — 82962 GLUCOSE BLOOD TEST: CPT

## 2024-10-05 PROCEDURE — G0378 HOSPITAL OBSERVATION PER HR: HCPCS

## 2024-10-05 RX ORDER — GLIPIZIDE 5 MG/1
5 TABLET ORAL NIGHTLY
Status: DISCONTINUED | OUTPATIENT
Start: 2024-10-05 | End: 2024-10-05

## 2024-10-05 RX ORDER — METOPROLOL SUCCINATE 25 MG/1
25 TABLET, EXTENDED RELEASE ORAL DAILY
Status: DISCONTINUED | OUTPATIENT
Start: 2024-10-05 | End: 2024-10-06

## 2024-10-05 RX ORDER — DEXTROSE MONOHYDRATE 100 MG/ML
INJECTION, SOLUTION INTRAVENOUS CONTINUOUS PRN
Status: DISCONTINUED | OUTPATIENT
Start: 2024-10-05 | End: 2024-10-06 | Stop reason: HOSPADM

## 2024-10-05 RX ORDER — INSULIN LISPRO 100 [IU]/ML
0-4 INJECTION, SOLUTION INTRAVENOUS; SUBCUTANEOUS
Status: DISCONTINUED | OUTPATIENT
Start: 2024-10-05 | End: 2024-10-06 | Stop reason: HOSPADM

## 2024-10-05 RX ORDER — GLUCAGON 1 MG/ML
1 KIT INJECTION PRN
Status: DISCONTINUED | OUTPATIENT
Start: 2024-10-05 | End: 2024-10-06 | Stop reason: HOSPADM

## 2024-10-05 RX ORDER — INSULIN LISPRO 100 [IU]/ML
0-4 INJECTION, SOLUTION INTRAVENOUS; SUBCUTANEOUS NIGHTLY
Status: DISCONTINUED | OUTPATIENT
Start: 2024-10-05 | End: 2024-10-06 | Stop reason: HOSPADM

## 2024-10-05 RX ORDER — GUAIFENESIN 400 MG/1
400 TABLET ORAL 2 TIMES DAILY PRN
Status: DISCONTINUED | OUTPATIENT
Start: 2024-10-05 | End: 2024-10-06 | Stop reason: HOSPADM

## 2024-10-05 RX ADMIN — SODIUM CHLORIDE, PRESERVATIVE FREE 10 ML: 5 INJECTION INTRAVENOUS at 09:14

## 2024-10-05 RX ADMIN — GABAPENTIN 600 MG: 300 CAPSULE ORAL at 21:35

## 2024-10-05 RX ADMIN — GLIPIZIDE 5 MG: 5 TABLET ORAL at 01:57

## 2024-10-05 RX ADMIN — AMLODIPINE BESYLATE 2.5 MG: 5 TABLET ORAL at 09:12

## 2024-10-05 RX ADMIN — CARBIDOPA AND LEVODOPA 2 TABLET: 25; 100 TABLET ORAL at 09:12

## 2024-10-05 RX ADMIN — INSULIN LISPRO 1 UNITS: 100 INJECTION, SOLUTION INTRAVENOUS; SUBCUTANEOUS at 12:31

## 2024-10-05 RX ADMIN — SODIUM CHLORIDE: 9 INJECTION, SOLUTION INTRAVENOUS at 12:14

## 2024-10-05 RX ADMIN — CARBIDOPA AND LEVODOPA 1 TABLET: 25; 100 TABLET ORAL at 01:57

## 2024-10-05 RX ADMIN — GUAIFENESIN 400 MG: 400 TABLET ORAL at 12:11

## 2024-10-05 RX ADMIN — CARBIDOPA AND LEVODOPA 1 TABLET: 25; 100 TABLET ORAL at 12:11

## 2024-10-05 RX ADMIN — GABAPENTIN 600 MG: 300 CAPSULE ORAL at 14:40

## 2024-10-05 RX ADMIN — SODIUM CHLORIDE: 9 INJECTION, SOLUTION INTRAVENOUS at 21:47

## 2024-10-05 RX ADMIN — METOPROLOL SUCCINATE 25 MG: 25 TABLET, FILM COATED, EXTENDED RELEASE ORAL at 09:13

## 2024-10-05 RX ADMIN — ATORVASTATIN CALCIUM 10 MG: 10 TABLET, FILM COATED ORAL at 21:35

## 2024-10-05 RX ADMIN — APIXABAN 5 MG: 5 TABLET, FILM COATED ORAL at 09:12

## 2024-10-05 RX ADMIN — CARBIDOPA AND LEVODOPA 1 TABLET: 25; 100 TABLET ORAL at 21:35

## 2024-10-05 RX ADMIN — RASAGILINE 1 MG: 1 TABLET ORAL at 09:13

## 2024-10-05 RX ADMIN — SODIUM CHLORIDE: 9 INJECTION, SOLUTION INTRAVENOUS at 01:21

## 2024-10-05 RX ADMIN — APIXABAN 5 MG: 5 TABLET, FILM COATED ORAL at 01:57

## 2024-10-05 RX ADMIN — CARBIDOPA AND LEVODOPA 2 TABLET: 25; 100 TABLET ORAL at 16:18

## 2024-10-05 RX ADMIN — SODIUM CHLORIDE, PRESERVATIVE FREE 10 ML: 5 INJECTION INTRAVENOUS at 02:02

## 2024-10-05 RX ADMIN — GABAPENTIN 600 MG: 300 CAPSULE ORAL at 09:14

## 2024-10-05 RX ADMIN — APIXABAN 5 MG: 5 TABLET, FILM COATED ORAL at 21:35

## 2024-10-05 ASSESSMENT — PAIN - FUNCTIONAL ASSESSMENT: PAIN_FUNCTIONAL_ASSESSMENT: ACTIVITIES ARE NOT PREVENTED

## 2024-10-05 ASSESSMENT — PAIN DESCRIPTION - LOCATION: LOCATION: ABDOMEN

## 2024-10-05 ASSESSMENT — PAIN SCALES - GENERAL: PAINLEVEL_OUTOF10: 2

## 2024-10-05 NOTE — ED PROVIDER NOTES
if correlates clinically         CTA ABDOMEN PELVIS W CONTRAST   Final Result   1. No acute arterial abnormality is identified   2. Suggestion of gastroenteritis or ileus   3. Suggestion of improved metastatic disease if correlates clinically           --------------------------------------------- PAST HISTORY ---------------------------------------------  Past Medical History:  has a past medical history of Acute left lumbar radiculopathy, YUDY (acute kidney injury) (HCC), Anxiety and depression, Cancer (HCC), Cerebral atrophy (HCC), Diabetes mellitus (HCC), Disc displacement, lumbar, History of pulmonary embolus (PE), Hx of deep venous thrombosis, Kidney disease, Nerve injury, Parkinson disease (HCC), Parkinson disease (HCC), Pneumonia, Pulmonary embolism (HCC), Renal carcinoma (HCC), Rhinovirus infection, and 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    Past Surgical History:  has a past surgical history that includes Cataract removal; back surgery; eye surgery; Endoscopy, colon, diagnostic; total nephrectomy (Right, 11/2013); IVC filter insertion (11/27/2013); IVC filter removal (02/24/2014); invasive vascular (N/A, 03/20/2024); knee surgery (Left); and invasive vascular (N/A, 6/12/2024).    Social History:  reports that he quit smoking about 44 years ago. His smoking use included cigars. He has never used smokeless tobacco. He reports current alcohol use. He reports that he does not use drugs.    Family History: family history includes Cancer in his sister; Diabetes in his brother; Heart Disease in his father and mother.     The patient’s home medications have been reviewed.    Allergies: Tape [adhesive tape]  REVIEW OF EXTERNAL NOTE :      Chart reviewed:   Previous Echo reviewed by me:  Lab Results   Component Value Date    LVEF 60 03/20/2021     No results found for this or any previous visit.       Controlled Substance Monitored by me:   Acute and Chronic Pain Monitoring:        No data to display  no loss of consciousness he is on Eliquis denies headache no neurologic deficits.  His only complaint is abdominal pain and diarrhea [KK]      ED Course User Index  [KK] Iris Burnham MD       --------------------------------------------- PAST HISTORY ---------------------------------------------  Past Medical History:  has a past medical history of Acute left lumbar radiculopathy, YUDY (acute kidney injury) (HCC), Anxiety and depression, Cancer (HCC), Cerebral atrophy (HCC), Diabetes mellitus (HCC), Disc displacement, lumbar, History of pulmonary embolus (PE), Hx of deep venous thrombosis, Kidney disease, Nerve injury, Parkinson disease (HCC), Parkinson disease (HCC), Pneumonia, Pulmonary embolism (HCC), Renal carcinoma (HCC), Rhinovirus infection, and 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    Past Surgical History:  has a past surgical history that includes Cataract removal; back surgery; eye surgery; Endoscopy, colon, diagnostic; total nephrectomy (Right, 11/2013); IVC filter insertion (11/27/2013); IVC filter removal (02/24/2014); invasive vascular (N/A, 03/20/2024); knee surgery (Left); and invasive vascular (N/A, 6/12/2024).    Social History:  reports that he quit smoking about 44 years ago. His smoking use included cigars. He has never used smokeless tobacco. He reports current alcohol use. He reports that he does not use drugs.    Family History: family history includes Cancer in his sister; Diabetes in his brother; Heart Disease in his father and mother.     The patient’s home medications have been reviewed.    Allergies: Tape [adhesive tape]    -------------------------------------------------- RESULTS -------------------------------------------------    LABS:  Results for orders placed or performed during the hospital encounter of 10/04/24   Blood Culture 1    Specimen: Blood   Result Value Ref Range    Specimen Description .BLOOD .ARM     Special Requests          Culture NO GROWTH <24 HRS

## 2024-10-05 NOTE — CARE COORDINATION
Internal Medicine On-call Care Coordination Note    I was called by the ED physician because they recommended admission for this patient and we cover their PCP.  The history as I understand it after discussion with the ED physician is as follows:    Presents after syncope and collapse  Also with abd pain  Imaging showing gastroenteritis  Troponin about same as previous  Hypotension in ED improved with IVF    I placed admission orders.  Including:    General admission orders  Reconciled home meds- held home lisinopril  Parameters added to metoprolol  Cardiology consult  IVF     Dr. Doyle, or our coverage will see the patient tomorrow for H&P.    Electronically signed by LIDIA Eaton CNP on 10/4/2024 at 10:16 PM

## 2024-10-05 NOTE — ED NOTES
ED to Inpatient Handoff Report    Notified Olya that electronic handoff available and patient ready for transport to room 604.    Safety Risks: Risk of falls    Patient in Restraints: no    Constant Observer or Patient : no    Telemetry Monitoring Ordered: Yes          Order to transfer to unit without monitor: NO    Last MEWS: 2 Time completed: 2228         Vitals:    10/04/24 2107 10/04/24 2113 10/04/24 2218 10/04/24 2228   BP: (!) 134/57 (!) 117/50 (!) 122/55 (!) 142/64   Pulse: 85 72 88 85   Resp: 27 23 24 21   Temp:    97.9 °F (36.6 °C)   TempSrc:       SpO2: 95% 99% 94% 93%   Weight:       Height:           Opportunity for questions and clarification was provided.

## 2024-10-05 NOTE — PLAN OF CARE
Problem: Chronic Conditions and Co-morbidities  Goal: Patient's chronic conditions and co-morbidity symptoms are monitored and maintained or improved  Outcome: Progressing     Problem: Discharge Planning  Goal: Discharge to home or other facility with appropriate resources  Outcome: Progressing  Flowsheets  Taken 10/5/2024 0033  Discharge to home or other facility with appropriate resources:   Identify barriers to discharge with patient and caregiver   Identify discharge learning needs (meds, wound care, etc)  Taken 10/4/2024 2354  Discharge to home or other facility with appropriate resources:   Identify barriers to discharge with patient and caregiver   Identify discharge learning needs (meds, wound care, etc)     Problem: Safety - Adult  Goal: Free from fall injury  Outcome: Progressing     Problem: ABCDS Injury Assessment  Goal: Absence of physical injury  Outcome: Progressing     Problem: Skin/Tissue Integrity  Goal: Absence of new skin breakdown  Description: 1.  Monitor for areas of redness and/or skin breakdown  2.  Assess vascular access sites hourly  3.  Every 4-6 hours minimum:  Change oxygen saturation probe site  4.  Every 4-6 hours:  If on nasal continuous positive airway pressure, respiratory therapy assess nares and determine need for appliance change or resting period.  Outcome: Progressing     Problem: Pain  Goal: Verbalizes/displays adequate comfort level or baseline comfort level  Outcome: Progressing

## 2024-10-05 NOTE — H&P
Internal Medicine History & Physical     Name: Frankie Hernández  : 1945  Chief Complaint: Loss of Consciousness  Primary Care Physician: Minor Cagle MD  Admission date: 10/4/2024  Date of service: 10/5/2024     History of Present Illness  Frankie is a 79 y.o. year old male.  Patient was at dinner with his wife.  Had been doing well.  Bent over to pick something up and then when he sat up he became dizzy and lightheaded.  Stated he was confused and not able to answer questions.  Patient is having his wife answer the questions for him.  States he still does not feel very well.  Does have a history of Parkinson's disease.  Patient has been up and out of the house on a regular basis.  He denies fevers, chills, headache, vision or hearing changes, dysuria, hematuria, constipation, diarrhea prior to coming in.  Patient came into the ER and then began having episodes of diarrhea, 10-12 overnight.  Diarrhea has since stopped.  They were eating dinner at [employer] and had a fish dinner.  The wife did not get sick.  Patient's currently feeling better.  No longer having the diarrhea.  Received IV fluids overnight.  He has still been somewhat hypotensive and was hypotensive on arrival to the ER with systolic blood pressures in the 60s.  Patient received IV fluids and held blood pressure medications with his blood pressure beginning to improve.  Patient is still eating but is having some cough with eating according to his patient's daughter.  Patient was seen by cardiology and medications were adjusted.  No other complaints of fevers or chills at this time.  Patient is compliant with his medications including his Sinemet.    Additionally, patient was found to be hypoxic in the ER and was started on oxygen.  Patient denies any cough, shortness of breath at this time.  No chest pain.      ED course:   Initial blood work and imaging studies performed. Admission recommended by ED physician. Case was discussed with ED  use of accessory muscles, on 1-1/2 L of oxygen  Heart: regular rate and regular rhythm, no murmur, normal S1, S2  Abdomen: soft, non-tender; bowel sounds normal; no masses, no organomegaly  : Deferred   Extremities: no lower extremity edema, extremities atraumatic, no cyanosis, no clubbing, 2+ pedal pulses palpated  Skin: normal color, normal texture, normal turgor, no rashes, no lesions  Neurologic:5/5 muscle strength throughout, normal muscle tone throughout, face symmetric, hearing intact, tongue midline, speech appropriate without slurring, sensation to fine touch intact in upper and lower extremities, resting tremor noted with tongue thrusting    Labs-   Lab Results   Component Value Date    WBC 17.6 (H) 10/05/2024    HGB 13.6 10/05/2024    HCT 43.4 10/05/2024     (L) 10/05/2024     10/05/2024    K 4.0 10/05/2024     (H) 10/05/2024    CREATININE 1.5 (H) 10/05/2024    BUN 28 (H) 10/05/2024    CO2 17 (L) 10/05/2024    GLUCOSE 212 (H) 10/05/2024    ALT <5 10/05/2024    AST 13 10/05/2024    INR 1.3 10/04/2024    APTT 28.5 10/04/2024     Lab Results   Component Value Date    CKTOTAL 81 05/11/2023    TROPONINI <0.01 03/19/2021           Recent Radiological Studies:  XR CHEST PORTABLE   Final Result   No evidence of pneumonia or pleural effusion.         CTA CHEST W CONTRAST   Final Result   1. No acute arterial abnormality is identified   2. Suggestion of gastroenteritis or ileus   3. Suggestion of improved metastatic disease if correlates clinically         CTA ABDOMEN PELVIS W CONTRAST   Final Result   1. No acute arterial abnormality is identified   2. Suggestion of gastroenteritis or ileus   3. Suggestion of improved metastatic disease if correlates clinically             Assessment  Active Hospital Problems    Diagnosis     Syncope and collapse [R55]     Cerebral atrophy (HCC) [G31.9]     History of pulmonary embolus (PE) [Z86.711]     Renal carcinoma (HCC) [C64.9]     Parkinson disease

## 2024-10-05 NOTE — CONSULTS
INPATIENT CONSULT-[facility] CARDIOLOGY    Name: Frankie Hernández    Age: 79 y.o.    Date of Admission: 10/4/2024  6:09 PM    Date of Service: 10/5/2024    Reason for Consultation: Syncope and hypotension    Referring Physician: Dr. Doyle    History of Present Illness: The patient is a 79 y.o. year old male admitted after a syncopal episode while at dinner with family.  Upon presentation blood pressure was 70/50 and subsequently was 60/40.  He is receiving IV fluids and blood pressure has improved, now around 120/70.  In no distress at this time.    Past Medical History:  Chronic right bundle branch block, diabetes, stage IIIa chronic kidney disease with GFR 55 cc/min unprovoked DVT on Eliquis, normal nuclear stress testing  with concomitant echo showing normal LVEF and aortic valve sclerosis but not stenosis.    Review of Systems:     Constitutional: No fever, chills, sweats  Cardiac: As per HPI  Pulmonary: As per HPI  HEENT: No visual disturbances or difficult swallowing  GI: No nausea, vomiting, diarrhea, abdominal pain, rectal bleeding  : No dysuria or hematuria  Endocrine: No excessive thirst, heat or cold intolerance.   Musculoskeletal: Joint pain but no muscle aches. No claudication  Skin: No skin breakdown or rashes  Neuro: No headache, confusion, or seizures  Psych: No depression, anxiety    Family History:  Family History   Problem Relation Age of Onset    Heart Disease Mother     Heart Disease Father     Cancer Sister     Diabetes Brother        Social History:  Social History     Socioeconomic History    Marital status:      Spouse name: Not on file    Number of children: Not on file    Years of education: Not on file    Highest education level: Not on file   Occupational History    Not on file   Tobacco Use    Smoking status: Former     Types: Cigars     Quit date:      Years since quittin.7    Smokeless tobacco: Never   Vaping Use    Vaping status: Never Used   Substance and  Oral BID Sangita Negrete APRN - CNP        apixaban (ELIQUIS) tablet 5 mg  5 mg Oral BID Sangita Negrete APRN - CNP   5 mg at 10/05/24 0157    gabapentin (NEURONTIN) capsule 600 mg  600 mg Oral TID Sangita Negrete APRN - CNP        ipratropium 0.5 mg-albuterol 2.5 mg (DUONEB) nebulizer solution 1 Dose  1 Dose Inhalation Q4H PRN Sangita Negrete APRN - CNP        metoprolol succinate (TOPROL XL) extended release tablet 50 mg  50 mg Oral Daily MaiaivSangita mancia APRN - CNP        rasagiline mesylate TABS 1 mg  1 tablet Oral Daily Sanigta Negrete APRN - CNP        sodium chloride flush 0.9 % injection 10 mL  10 mL IntraVENous 2 times per day Sangita Negrete APRN - CNP   10 mL at 10/05/24 0202    sodium chloride flush 0.9 % injection 10 mL  10 mL IntraVENous PRN Sangita Negrete APRN - CNP        0.9 % sodium chloride infusion   IntraVENous PRN Sangita Negrete APRN - CNP        potassium chloride (KLOR-CON M) extended release tablet 40 mEq  40 mEq Oral PRN Sangita Negrete APRN - CNP        Or    potassium bicarb-citric acid (EFFER-K) effervescent tablet 40 mEq  40 mEq Oral PRN LacivSangita mancia APRN - CNP        Or    potassium chloride 10 mEq/100 mL IVPB (Peripheral Line)  10 mEq IntraVENous PRN Sangita Negrete APRN - CNP        magnesium sulfate 2000 mg in 50 mL IVPB premix  2,000 mg IntraVENous PRN MaiaivSangita mancia, APRN - CNP        senna (SENOKOT) tablet 8.6 mg  1 tablet Oral Daily PRN Sangita Negrete APRN - CNP        acetaminophen (TYLENOL) tablet 650 mg  650 mg Oral Q6H PRN Sangita Negrete APRN - CNP        Or    acetaminophen (TYLENOL) suppository 650 mg  650 mg Rectal Q6H PRN Sangita Negrete, APRN - CNP        0.9 % sodium chloride infusion   IntraVENous Continuous LacSangita coy APRN -  mL/hr at 10/05/24 0121 New Bag at 10/05/24 0121    prochlorperazine (COMPAZINE) tablet 10 mg  10 mg Oral Q8H PRN Sangita Negrete APRN - CNP        Or    prochlorperazine (COMPAZINE) injection

## 2024-10-06 VITALS
RESPIRATION RATE: 18 BRPM | SYSTOLIC BLOOD PRESSURE: 148 MMHG | WEIGHT: 225 LBS | HEIGHT: 72 IN | TEMPERATURE: 97.7 F | DIASTOLIC BLOOD PRESSURE: 78 MMHG | OXYGEN SATURATION: 97 % | HEART RATE: 95 BPM | BODY MASS INDEX: 30.48 KG/M2

## 2024-10-06 LAB
ALBUMIN SERPL-MCNC: 3.1 G/DL (ref 3.5–5.2)
ALP SERPL-CCNC: 61 U/L (ref 40–129)
ALT SERPL-CCNC: <5 U/L (ref 0–40)
ANION GAP SERPL CALCULATED.3IONS-SCNC: 8 MMOL/L (ref 7–16)
AST SERPL-CCNC: 19 U/L (ref 0–39)
BASOPHILS # BLD: 0.02 K/UL (ref 0–0.2)
BASOPHILS NFR BLD: 0 % (ref 0–2)
BILIRUB SERPL-MCNC: 0.6 MG/DL (ref 0–1.2)
BUN SERPL-MCNC: 24 MG/DL (ref 6–23)
CALCIUM SERPL-MCNC: 8.3 MG/DL (ref 8.6–10.2)
CHLORIDE SERPL-SCNC: 113 MMOL/L (ref 98–107)
CO2 SERPL-SCNC: 16 MMOL/L (ref 22–29)
CREAT SERPL-MCNC: 1.3 MG/DL (ref 0.7–1.2)
EOSINOPHIL # BLD: 0.09 K/UL (ref 0.05–0.5)
EOSINOPHILS RELATIVE PERCENT: 1 % (ref 0–6)
ERYTHROCYTE [DISTWIDTH] IN BLOOD BY AUTOMATED COUNT: 13.9 % (ref 11.5–15)
GFR, ESTIMATED: 54 ML/MIN/1.73M2
GLUCOSE BLD-MCNC: 140 MG/DL (ref 74–99)
GLUCOSE BLD-MCNC: 180 MG/DL (ref 74–99)
GLUCOSE BLD-MCNC: 187 MG/DL (ref 74–99)
GLUCOSE SERPL-MCNC: 142 MG/DL (ref 74–99)
HCT VFR BLD AUTO: 40.8 % (ref 37–54)
HGB BLD-MCNC: 12.9 G/DL (ref 12.5–16.5)
IMM GRANULOCYTES # BLD AUTO: 0.03 K/UL (ref 0–0.58)
IMM GRANULOCYTES NFR BLD: 0 % (ref 0–5)
LYMPHOCYTES NFR BLD: 1.25 K/UL (ref 1.5–4)
LYMPHOCYTES RELATIVE PERCENT: 11 % (ref 20–42)
MCH RBC QN AUTO: 31.1 PG (ref 26–35)
MCHC RBC AUTO-ENTMCNC: 31.6 G/DL (ref 32–34.5)
MCV RBC AUTO: 98.3 FL (ref 80–99.9)
MICROORGANISM SPEC CULT: NO GROWTH
MONOCYTES NFR BLD: 0.86 K/UL (ref 0.1–0.95)
MONOCYTES NFR BLD: 8 % (ref 2–12)
NEUTROPHILS NFR BLD: 80 % (ref 43–80)
NEUTS SEG NFR BLD: 9.22 K/UL (ref 1.8–7.3)
PLATELET # BLD AUTO: 93 K/UL (ref 130–450)
PLATELET CONFIRMATION: NORMAL
PMV BLD AUTO: 10 FL (ref 7–12)
POTASSIUM SERPL-SCNC: 4.6 MMOL/L (ref 3.5–5)
PROT SERPL-MCNC: 5.1 G/DL (ref 6.4–8.3)
RBC # BLD AUTO: 4.15 M/UL (ref 3.8–5.8)
SERVICE CMNT-IMP: NORMAL
SODIUM SERPL-SCNC: 137 MMOL/L (ref 132–146)
SPECIMEN DESCRIPTION: NORMAL
WBC OTHER # BLD: 11.5 K/UL (ref 4.5–11.5)

## 2024-10-06 PROCEDURE — 80053 COMPREHEN METABOLIC PANEL: CPT

## 2024-10-06 PROCEDURE — 2580000003 HC RX 258: Performed by: NURSE PRACTITIONER

## 2024-10-06 PROCEDURE — 6370000000 HC RX 637 (ALT 250 FOR IP): Performed by: INTERNAL MEDICINE

## 2024-10-06 PROCEDURE — 36415 COLL VENOUS BLD VENIPUNCTURE: CPT

## 2024-10-06 PROCEDURE — 6370000000 HC RX 637 (ALT 250 FOR IP): Performed by: HOSPITALIST

## 2024-10-06 PROCEDURE — 85025 COMPLETE CBC W/AUTO DIFF WBC: CPT

## 2024-10-06 PROCEDURE — G0378 HOSPITAL OBSERVATION PER HR: HCPCS

## 2024-10-06 PROCEDURE — 6370000000 HC RX 637 (ALT 250 FOR IP): Performed by: NURSE PRACTITIONER

## 2024-10-06 PROCEDURE — 82962 GLUCOSE BLOOD TEST: CPT

## 2024-10-06 RX ORDER — AMLODIPINE BESYLATE 5 MG/1
5 TABLET ORAL DAILY
Status: DISCONTINUED | OUTPATIENT
Start: 2024-10-06 | End: 2024-10-06 | Stop reason: HOSPADM

## 2024-10-06 RX ORDER — AMLODIPINE BESYLATE 5 MG/1
5 TABLET ORAL DAILY
Qty: 30 TABLET | Refills: 3 | Status: SHIPPED | OUTPATIENT
Start: 2024-10-07

## 2024-10-06 RX ORDER — LOPERAMIDE HCL 2 MG
2 CAPSULE ORAL 4 TIMES DAILY PRN
Status: DISCONTINUED | OUTPATIENT
Start: 2024-10-06 | End: 2024-10-06 | Stop reason: HOSPADM

## 2024-10-06 RX ADMIN — CARBIDOPA AND LEVODOPA 2 TABLET: 25; 100 TABLET ORAL at 08:26

## 2024-10-06 RX ADMIN — GABAPENTIN 600 MG: 300 CAPSULE ORAL at 14:46

## 2024-10-06 RX ADMIN — APIXABAN 5 MG: 5 TABLET, FILM COATED ORAL at 08:25

## 2024-10-06 RX ADMIN — AMLODIPINE BESYLATE 5 MG: 5 TABLET ORAL at 08:25

## 2024-10-06 RX ADMIN — SODIUM CHLORIDE, PRESERVATIVE FREE 10 ML: 5 INJECTION INTRAVENOUS at 08:26

## 2024-10-06 RX ADMIN — GABAPENTIN 600 MG: 300 CAPSULE ORAL at 08:25

## 2024-10-06 RX ADMIN — ATORVASTATIN CALCIUM 10 MG: 10 TABLET, FILM COATED ORAL at 08:25

## 2024-10-06 RX ADMIN — CARBIDOPA AND LEVODOPA 2 TABLET: 25; 100 TABLET ORAL at 15:59

## 2024-10-06 RX ADMIN — LOPERAMIDE HYDROCHLORIDE 2 MG: 2 CAPSULE ORAL at 11:48

## 2024-10-06 RX ADMIN — CARBIDOPA AND LEVODOPA 1 TABLET: 25; 100 TABLET ORAL at 11:49

## 2024-10-06 RX ADMIN — RASAGILINE 1 MG: 1 TABLET ORAL at 08:25

## 2024-10-06 RX ADMIN — LOPERAMIDE HYDROCHLORIDE 2 MG: 2 CAPSULE ORAL at 16:45

## 2024-10-06 RX ADMIN — SODIUM CHLORIDE: 9 INJECTION, SOLUTION INTRAVENOUS at 08:29

## 2024-10-06 NOTE — PROGRESS NOTES
10/04/24 1937   Treatment   Breath Sounds Post-Tx BETHEL (S)  Diminished  (refused remainder of tx)   Breath Sounds Post-Tx LLL Diminished   Breath Sounds Post-Tx RUL Diminished   Breath Sounds Post-Tx RML Diminished   Breath Sounds Post-Tx RLL Diminished   Post-Tx Pulse 69   Post-Tx Resps 18   Oxygen Therapy/Pulse Ox   O2 Device Nasal cannula   O2 Flow Rate (L/min) 3 L/min   Pulse 76   Respirations 22   SpO2 99 %       
4 Eyes Skin Assessment     NAME:  Frankie Hernández  YOB: 1945  MEDICAL RECORD NUMBER:  [id number]    The patient is being assessed for  Admission    I agree that at least one RN has performed a thorough Head to Toe Skin Assessment on the patient. ALL assessment sites listed below have been assessed.      Areas assessed by both nurses:    Head, Face, Ears, Shoulders, Back, Chest, Arms, Elbows, Hands, Sacrum. Buttock, Coccyx, Ischium, Legs. Feet and Heels, and Under Medical Devices         Does the Patient have a Wound? No noted wound(s)       Ray Prevention initiated by RN: No    Wound Care Orders initiated by RN: No    Pressure Injury (Stage 3,4, Unstageable, DTI, NWPT, and Complex wounds) if present, place Wound referral order by RN under : No    New Ostomies, if present place, Ostomy referral order under : No     Nurse 1 eSignature: Electronically signed by Rajendra Montana RN on 10/5/24 at 5:14 AM EDT    **SHARE this note so that the co-signing nurse can place an eSignature**    Nurse 2 eSignature: Electronically signed by SHERI YING RN on 10/5/24 at 5:29 AM EDT   
Contacted CLARISSE Negrete regarding incorrect dosing of Sinemet and Glipizide. Awaiting orders   
DAILY PROGRESS NOTE -[facility] CARDIOLOGY    SUBJECTIVE:    Being followed for syncope most likely due to medication-induced hypotension.  Awake and offers no chest pain, worsening dyspnea, palpitations, syncope, presyncope.  Appears unable to give cogent history but his wife in the room with him states that he has no dementia but does not speak much due to Parkinson's.  Blood pressure now mildly hypertensive at 141/88.    Hypertension, diabetes, stage IIIa chronic kidney disease, chronic RBBB, remote DVT on Eliquis, normal LVEF and sclerotic but otherwise normally functioning valves on 2022 echo.    OBJECTIVE:    His vital signs were reviewed today.    Vitals:    10/06/24 0330   BP: (!) 141/88   Pulse: 87   Resp: 17   Temp: 98 °F (36.7 °C)   SpO2: 95%       Scheduled Meds:   metoprolol succinate  25 mg Oral Daily    insulin lispro  0-4 Units SubCUTAneous TID WC    insulin lispro  0-4 Units SubCUTAneous Nightly    [Held by provider] amLODIPine  2.5 mg Oral Daily    atorvastatin  10 mg Oral Daily    carbidopa-levodopa  1 tablet Oral BID    carbidopa-levodopa  2 tablet Oral BID    apixaban  5 mg Oral BID    gabapentin  600 mg Oral TID    rasagiline mesylate  1 tablet Oral Daily    sodium chloride flush  10 mL IntraVENous 2 times per day     Continuous Infusions:   dextrose      sodium chloride      sodium chloride 100 mL/hr at 10/05/24 2147     PRN Meds:.guaiFENesin, glucose, dextrose bolus **OR** dextrose bolus, glucagon (rDNA), dextrose, ipratropium 0.5 mg-albuterol 2.5 mg, sodium chloride flush, sodium chloride, potassium chloride **OR** potassium alternative oral replacement **OR** potassium chloride, magnesium sulfate, senna, acetaminophen **OR** acetaminophen, prochlorperazine **OR** prochlorperazine    REVIEW OF SYSTEMS:     No pruritus, rash, bruising.  Cardiac and pulmonary symptoms per HPI.  No nausea, vomiting, abdominal pain, GI bleeding, change in bowel habits.  No dysuria, urinary frequency, urgency, 
Dr. Elizondo contacted re: patient wanting to be discharged.  Dr. Elizondo stated the patient could have the ECHO done OP and to follow up with cardiology.  
chloride infusion   IntraVENous Continuous LacivSangita mancia APRN -  mL/hr at 10/06/24 0829 New Bag at 10/06/24 0829    prochlorperazine (COMPAZINE) tablet 10 mg  10 mg Oral Q8H PRN LacivitaSangita APRN - CNP        Or    prochlorperazine (COMPAZINE) injection 10 mg  10 mg IntraVENous Q6H PRN LacivitaSangita APRN - CNP           PRN Medications  guaiFENesin, glucose, dextrose bolus **OR** dextrose bolus, glucagon (rDNA), dextrose, ipratropium 0.5 mg-albuterol 2.5 mg, sodium chloride flush, sodium chloride, potassium chloride **OR** potassium alternative oral replacement **OR** potassium chloride, magnesium sulfate, senna, acetaminophen **OR** acetaminophen, prochlorperazine **OR** prochlorperazine    Objective  Most Recent Recorded Vitals  BP (!) 159/82   Pulse 82   Temp 98.1 °F (36.7 °C) (Oral)   Resp 20   Ht 1.829 m (6')   Wt 102.1 kg (225 lb)   SpO2 95%   BMI 30.52 kg/m²   I/O last 3 completed shifts:  In: -   Out: 2500 [Urine:2500]  No intake/output data recorded.    Physical Exam:  General: AAO to person/place/time/purpose, NAD, no labored breathing  Eyes: conjunctivae/corneas clear, sclera non icteric  Skin: color/texture/turgor normal, no rashes or lesions  Lungs: CTAB, no retractions/use of accessory muscles, no vocal fremitus, no rhonchi, no crackle, no rales  Heart: regular rate, regular rhythm, no murmur  Abdomen: soft, NT, bowel sounds normal  Extremities: atraumatic, no edema, weakness  Neurologic: cranial nerves 2-12 grossly intact, no slurred speech    Most Recent Labs  Lab Results   Component Value Date    WBC 11.5 10/06/2024    HGB 12.9 10/06/2024    HCT 40.8 10/06/2024    PLT 93 (L) 10/06/2024     10/06/2024    K 4.6 10/06/2024     (H) 10/06/2024    CREATININE 1.3 (H) 10/06/2024    BUN 24 (H) 10/06/2024    CO2 16 (L) 10/06/2024    GLUCOSE 142 (H) 10/06/2024    ALT <5 10/06/2024    AST 19 10/06/2024    INR 1.3 10/04/2024    APTT 28.5 10/04/2024    TSH 0.817 12/22/2021

## 2024-10-06 NOTE — DISCHARGE SUMMARY
PROTIME 13.4 (H) 10/04/2024    PROTIME 13.8 (H) 07/29/2024    PROTIME 14.6 (H) 11/16/2022      Lab Results   Component Value Date    TSH 0.817 12/22/2021    TSH 1.600 03/20/2021    TSH 1.540 04/23/2019     Lab Results   Component Value Date    TRIG 59 12/22/2021    TRIG 69 03/20/2021    HDL 50 12/22/2021    HDL 51 03/20/2021     No results for input(s): \"MG\" in the last 72 hours.    No results for input(s): \"CKTOTAL\", \"CKMB\", \"TROPONINI\" in the last 72 hours.   Recent Labs     10/04/24  1828   LACTA 2.3*       IMAGING:  CTA CHEST W CONTRAST    Result Date: 10/4/2024  EXAMINATION: CTA OF THE CHEST; CTA OF THE ABDOMEN AND PELVIS WITH CONTRAST 10/4/2024 6:43 pm TECHNIQUE: CTA of the chest was performed after the administration of intravenous contrast.  Multiplanar reformatted images are provided for review.  MIP images are provided for review. Automated exposure control, iterative reconstruction, and/or weight based adjustment of the mA/kV was utilized to reduce the radiation dose to as low as reasonably achievable.; CTA of the abdomen and pelvis was performed with the administration of intravenous contrast. Multiplanar reformatted images are provided for review.  MIP images are provided for review. Automated exposure control, iterative reconstruction, and/or weight based adjustment of the mA/kV was utilized to reduce the radiation dose to as low as reasonably achievable. COMPARISON: 2-24 HISTORY: ORDERING SYSTEM PROVIDED HISTORY: syncopal episode at [place], abdominal discomfort, on eliquis, hypotension TECHNOLOGIST PROVIDED HISTORY: Reason for exam:->syncopal episode at [place], abdominal discomfort, on eliquis, hypotension Additional Contrast?->1 FINDINGS: Abdomen/pelvis: Right retroperitoneal arterial enhancing mass is redemonstrated status post right nephrectomy.  Mass therefore suspicious for metastatic disease and has an appearance that would be consistent with renal cell carcinoma.  Size actually reduced  fall, hypotension for EMS, improved with fluids, aox4, at baseline per EMS, TECHNOLOGIST PROVIDED HISTORY: Reason for exam:->episode of syncope while bending down in chair, no fall, hypotension for EMS, improved with fluids, aox4, at baseline per EMS, FINDINGS: No airspace opacity or pleural effusion.  There are low lung volumes.  The heart is normal size.  No pneumothorax.     No evidence of pneumonia or pleural effusion.         ECHO:  pending    DISPOSITION:  The patient's condition is stable.   The patient is being discharged to home with [facility]    DISCHARGE MEDICATIONS:      Medication List        CHANGE how you take these medications      amLODIPine 5 MG tablet  Commonly known as: NORVASC  Take 1 tablet by mouth daily  Start taking on: October 7, 2024  What changed:   medication strength  how much to take            CONTINUE taking these medications      atorvastatin 10 MG tablet  Commonly known as: LIPITOR     * carbidopa-levodopa  MG per tablet  Commonly known as: SINEMET     * carbidopa-levodopa  MG per tablet  Commonly known as: SINEMET     Eliquis 5 MG Tabs tablet  Generic drug: apixaban     gabapentin 300 MG capsule  Commonly known as: NEURONTIN     glipiZIDE 5 MG tablet  Commonly known as: GLUCOTROL     ipratropium 0.5 mg-albuterol 2.5 mg 0.5-2.5 (3) MG/3ML Soln nebulizer solution  Commonly known as: DUONEB  Inhale 3 mLs into the lungs every 4 hours as needed for Shortness of Breath     rasagiline mesylate 1 MG Tabs           * This list has 2 medication(s) that are the same as other medications prescribed for you. Read the directions carefully, and ask your doctor or other care provider to review them with you.                STOP taking these medications      lisinopril 20 MG tablet  Commonly known as: PRINIVIL;ZESTRIL     metoprolol succinate 50 MG extended release tablet  Commonly known as: TOPROL XL               Where to Get Your Medications        These medications were sent to [facility]

## 2024-10-06 NOTE — CARE COORDINATION
Received a phone call about possible discharge today and needing HHC set up. Patient wife would like to use [facility]. Patient has a PCP affiliated with [facility] and [facility]. Referral made to Elisha with [facility]. Nursing notified. [facility] order placed.   Electronically signed by Alexa Jeffries RN on 10/6/2024 at 3:50 PM

## 2024-10-07 LAB
G LAMBLIA AG STL QL IA: NEGATIVE
MICROORGANISM SPEC CULT: NORMAL
MICROORGANISM SPEC CULT: NORMAL
SOURCE: NORMAL
SPECIMEN DESCRIPTION: NORMAL
SPECIMEN DESCRIPTION: NORMAL

## 2024-10-08 LAB
EKG ATRIAL RATE: 67 BPM
EKG P AXIS: 33 DEGREES
EKG P-R INTERVAL: 140 MS
EKG Q-T INTERVAL: 482 MS
EKG QRS DURATION: 134 MS
EKG QTC CALCULATION (BAZETT): 509 MS
EKG R AXIS: -4 DEGREES
EKG T AXIS: 12 DEGREES
EKG VENTRICULAR RATE: 67 BPM

## 2025-01-27 ENCOUNTER — HOSPITAL ENCOUNTER (INPATIENT)
Age: 80
LOS: 8 days | Discharge: NURSING, REHAB OR LONG-TERM CARE | DRG: 065 | End: 2025-02-04
Attending: EMERGENCY MEDICINE | Admitting: INTERNAL MEDICINE
Payer: MEDICARE

## 2025-01-27 ENCOUNTER — APPOINTMENT (OUTPATIENT)
Dept: CT IMAGING | Age: 80
DRG: 065 | End: 2025-01-27
Payer: MEDICARE

## 2025-01-27 DIAGNOSIS — M79.605 LEFT LEG PAIN: ICD-10-CM

## 2025-01-27 DIAGNOSIS — I63.9 CEREBROVASCULAR ACCIDENT (CVA), UNSPECIFIED MECHANISM (HCC): ICD-10-CM

## 2025-01-27 DIAGNOSIS — I82.4Y9 DEEP VEIN THROMBOSIS (DVT) OF PROXIMAL LOWER EXTREMITY, UNSPECIFIED CHRONICITY, UNSPECIFIED LATERALITY (HCC): ICD-10-CM

## 2025-01-27 DIAGNOSIS — I63.312 CEREBROVASCULAR ACCIDENT (CVA) DUE TO THROMBOSIS OF LEFT MIDDLE CEREBRAL ARTERY (HCC): ICD-10-CM

## 2025-01-27 DIAGNOSIS — I63.519 CEREBROVASCULAR ACCIDENT (CVA) DUE TO OCCLUSION OF MIDDLE CEREBRAL ARTERY, UNSPECIFIED BLOOD VESSEL LATERALITY (HCC): Primary | ICD-10-CM

## 2025-01-27 PROBLEM — I63.512 STROKE DUE TO OCCLUSION OF LEFT MIDDLE CEREBRAL ARTERY (HCC): Status: ACTIVE | Noted: 2025-01-27

## 2025-01-27 PROBLEM — I63.512 ACUTE ISCHEMIC LEFT MCA STROKE (HCC): Status: ACTIVE | Noted: 2025-01-27

## 2025-01-27 LAB
ALBUMIN SERPL-MCNC: 3.9 G/DL (ref 3.5–5.2)
ALP SERPL-CCNC: 83 U/L (ref 40–129)
ALT SERPL-CCNC: <5 U/L (ref 0–40)
ANION GAP SERPL CALCULATED.3IONS-SCNC: 9 MMOL/L (ref 7–16)
AST SERPL-CCNC: 17 U/L (ref 0–39)
BASOPHILS # BLD: 0.06 K/UL (ref 0–0.2)
BASOPHILS NFR BLD: 1 % (ref 0–2)
BILIRUB SERPL-MCNC: 0.7 MG/DL (ref 0–1.2)
BUN SERPL-MCNC: 19 MG/DL (ref 6–23)
CALCIUM SERPL-MCNC: 8.8 MG/DL (ref 8.6–10.2)
CHLORIDE SERPL-SCNC: 102 MMOL/L (ref 98–107)
CO2 SERPL-SCNC: 25 MMOL/L (ref 22–29)
CREAT SERPL-MCNC: 1.4 MG/DL (ref 0.7–1.2)
EOSINOPHIL # BLD: 0.08 K/UL (ref 0.05–0.5)
EOSINOPHILS RELATIVE PERCENT: 1 % (ref 0–6)
ERYTHROCYTE [DISTWIDTH] IN BLOOD BY AUTOMATED COUNT: 12.9 % (ref 11.5–15)
GFR, ESTIMATED: 52 ML/MIN/1.73M2
GLUCOSE BLD-MCNC: 175 MG/DL (ref 74–99)
GLUCOSE SERPL-MCNC: 184 MG/DL (ref 74–99)
HCT VFR BLD AUTO: 42.6 % (ref 37–54)
HGB BLD-MCNC: 14 G/DL (ref 12.5–16.5)
IMM GRANULOCYTES # BLD AUTO: 0.06 K/UL (ref 0–0.58)
IMM GRANULOCYTES NFR BLD: 1 % (ref 0–5)
INR PPP: 1.2
LYMPHOCYTES NFR BLD: 2.06 K/UL (ref 1.5–4)
LYMPHOCYTES RELATIVE PERCENT: 26 % (ref 20–42)
MCH RBC QN AUTO: 31.8 PG (ref 26–35)
MCHC RBC AUTO-ENTMCNC: 32.9 G/DL (ref 32–34.5)
MCV RBC AUTO: 96.8 FL (ref 80–99.9)
MONOCYTES NFR BLD: 0.78 K/UL (ref 0.1–0.95)
MONOCYTES NFR BLD: 10 % (ref 2–12)
NEUTROPHILS NFR BLD: 61 % (ref 43–80)
NEUTS SEG NFR BLD: 4.81 K/UL (ref 1.8–7.3)
PARTIAL THROMBOPLASTIN TIME: 36.5 SEC (ref 24.5–35.1)
PLATELET, FLUORESCENCE: 136 K/UL (ref 130–450)
PMV BLD AUTO: 10 FL (ref 7–12)
POTASSIUM SERPL-SCNC: 4.6 MMOL/L (ref 3.5–5)
PROT SERPL-MCNC: 6.3 G/DL (ref 6.4–8.3)
PROTHROMBIN TIME: 13.2 SEC (ref 9.3–12.4)
RBC # BLD AUTO: 4.4 M/UL (ref 3.8–5.8)
SODIUM SERPL-SCNC: 136 MMOL/L (ref 132–146)
WBC OTHER # BLD: 7.9 K/UL (ref 4.5–11.5)

## 2025-01-27 PROCEDURE — 70496 CT ANGIOGRAPHY HEAD: CPT

## 2025-01-27 PROCEDURE — 2580000003 HC RX 258

## 2025-01-27 PROCEDURE — 82962 GLUCOSE BLOOD TEST: CPT

## 2025-01-27 PROCEDURE — 6370000000 HC RX 637 (ALT 250 FOR IP)

## 2025-01-27 PROCEDURE — 2580000003 HC RX 258: Performed by: EMERGENCY MEDICINE

## 2025-01-27 PROCEDURE — 6360000002 HC RX W HCPCS: Performed by: STUDENT IN AN ORGANIZED HEALTH CARE EDUCATION/TRAINING PROGRAM

## 2025-01-27 PROCEDURE — 2060000000 HC ICU INTERMEDIATE R&B

## 2025-01-27 PROCEDURE — 6360000004 HC RX CONTRAST MEDICATION: Performed by: RADIOLOGY

## 2025-01-27 PROCEDURE — 99285 EMERGENCY DEPT VISIT HI MDM: CPT

## 2025-01-27 PROCEDURE — 85610 PROTHROMBIN TIME: CPT

## 2025-01-27 PROCEDURE — 99449 NTRPROF PH1/NTRNET/EHR 31/>: CPT | Performed by: PSYCHIATRY & NEUROLOGY

## 2025-01-27 PROCEDURE — 85025 COMPLETE CBC W/AUTO DIFF WBC: CPT

## 2025-01-27 PROCEDURE — 70498 CT ANGIOGRAPHY NECK: CPT

## 2025-01-27 PROCEDURE — 80053 COMPREHEN METABOLIC PANEL: CPT

## 2025-01-27 PROCEDURE — 0042T CT BRAIN PERFUSION: CPT

## 2025-01-27 PROCEDURE — 70450 CT HEAD/BRAIN W/O DYE: CPT

## 2025-01-27 PROCEDURE — 85730 THROMBOPLASTIN TIME PARTIAL: CPT

## 2025-01-27 RX ORDER — SODIUM CHLORIDE 9 MG/ML
INJECTION, SOLUTION INTRAVENOUS ONCE
Status: COMPLETED | OUTPATIENT
Start: 2025-01-27 | End: 2025-01-28

## 2025-01-27 RX ORDER — CARBIDOPA AND LEVODOPA 25; 100 MG/1; MG/1
1 TABLET ORAL 2 TIMES DAILY
Status: DISCONTINUED | OUTPATIENT
Start: 2025-01-28 | End: 2025-02-04 | Stop reason: HOSPADM

## 2025-01-27 RX ORDER — SODIUM CHLORIDE 9 MG/ML
INJECTION, SOLUTION INTRAVENOUS PRN
Status: DISCONTINUED | OUTPATIENT
Start: 2025-01-27 | End: 2025-02-04 | Stop reason: HOSPADM

## 2025-01-27 RX ORDER — 0.9 % SODIUM CHLORIDE 0.9 %
1000 INTRAVENOUS SOLUTION INTRAVENOUS ONCE
Status: COMPLETED | OUTPATIENT
Start: 2025-01-27 | End: 2025-01-27

## 2025-01-27 RX ORDER — IPRATROPIUM BROMIDE AND ALBUTEROL SULFATE 2.5; .5 MG/3ML; MG/3ML
1 SOLUTION RESPIRATORY (INHALATION) EVERY 4 HOURS PRN
Status: DISCONTINUED | OUTPATIENT
Start: 2025-01-27 | End: 2025-02-04 | Stop reason: HOSPADM

## 2025-01-27 RX ORDER — AMLODIPINE BESYLATE 5 MG/1
5 TABLET ORAL DAILY
Status: DISCONTINUED | OUTPATIENT
Start: 2025-01-28 | End: 2025-02-04 | Stop reason: HOSPADM

## 2025-01-27 RX ORDER — CARBIDOPA AND LEVODOPA 25; 100 MG/1; MG/1
2 TABLET ORAL 2 TIMES DAILY
Status: DISCONTINUED | OUTPATIENT
Start: 2025-01-28 | End: 2025-02-04 | Stop reason: HOSPADM

## 2025-01-27 RX ORDER — SODIUM CHLORIDE 0.9 % (FLUSH) 0.9 %
5-40 SYRINGE (ML) INJECTION PRN
Status: DISCONTINUED | OUTPATIENT
Start: 2025-01-27 | End: 2025-02-04 | Stop reason: HOSPADM

## 2025-01-27 RX ORDER — RASAGILINE 1 MG/1
1 TABLET ORAL DAILY
Status: DISCONTINUED | OUTPATIENT
Start: 2025-01-28 | End: 2025-02-04 | Stop reason: HOSPADM

## 2025-01-27 RX ORDER — POLYETHYLENE GLYCOL 3350 17 G/17G
17 POWDER, FOR SOLUTION ORAL DAILY PRN
Status: DISCONTINUED | OUTPATIENT
Start: 2025-01-27 | End: 2025-02-04 | Stop reason: HOSPADM

## 2025-01-27 RX ORDER — ONDANSETRON 2 MG/ML
4 INJECTION INTRAMUSCULAR; INTRAVENOUS EVERY 6 HOURS PRN
Status: DISCONTINUED | OUTPATIENT
Start: 2025-01-27 | End: 2025-01-27 | Stop reason: CLARIF

## 2025-01-27 RX ORDER — ASPIRIN 300 MG/1
300 SUPPOSITORY RECTAL DAILY
Status: DISCONTINUED | OUTPATIENT
Start: 2025-01-28 | End: 2025-02-04 | Stop reason: HOSPADM

## 2025-01-27 RX ORDER — ASPIRIN 300 MG/1
300 SUPPOSITORY RECTAL ONCE
Status: COMPLETED | OUTPATIENT
Start: 2025-01-27 | End: 2025-01-27

## 2025-01-27 RX ORDER — DIPHENHYDRAMINE HYDROCHLORIDE 50 MG/ML
25 INJECTION INTRAMUSCULAR; INTRAVENOUS ONCE
Status: COMPLETED | OUTPATIENT
Start: 2025-01-27 | End: 2025-01-27

## 2025-01-27 RX ORDER — GLIPIZIDE 5 MG/1
5 TABLET ORAL NIGHTLY
Status: DISCONTINUED | OUTPATIENT
Start: 2025-01-27 | End: 2025-02-04 | Stop reason: HOSPADM

## 2025-01-27 RX ORDER — SODIUM CHLORIDE 0.9 % (FLUSH) 0.9 %
5-40 SYRINGE (ML) INJECTION EVERY 12 HOURS SCHEDULED
Status: DISCONTINUED | OUTPATIENT
Start: 2025-01-27 | End: 2025-02-04 | Stop reason: HOSPADM

## 2025-01-27 RX ORDER — IOPAMIDOL 755 MG/ML
120 INJECTION, SOLUTION INTRAVASCULAR
Status: COMPLETED | OUTPATIENT
Start: 2025-01-27 | End: 2025-01-27

## 2025-01-27 RX ORDER — ATORVASTATIN CALCIUM 40 MG/1
40 TABLET, FILM COATED ORAL NIGHTLY
Status: DISCONTINUED | OUTPATIENT
Start: 2025-01-27 | End: 2025-02-04 | Stop reason: HOSPADM

## 2025-01-27 RX ORDER — PROCHLORPERAZINE EDISYLATE 5 MG/ML
10 INJECTION INTRAMUSCULAR; INTRAVENOUS EVERY 6 HOURS PRN
Status: DISCONTINUED | OUTPATIENT
Start: 2025-01-27 | End: 2025-02-04 | Stop reason: HOSPADM

## 2025-01-27 RX ORDER — ACETAMINOPHEN 325 MG/1
650 TABLET ORAL ONCE
Status: COMPLETED | OUTPATIENT
Start: 2025-01-27 | End: 2025-02-03

## 2025-01-27 RX ORDER — PROCHLORPERAZINE MALEATE 10 MG
10 TABLET ORAL EVERY 8 HOURS PRN
Status: DISCONTINUED | OUTPATIENT
Start: 2025-01-27 | End: 2025-02-04 | Stop reason: HOSPADM

## 2025-01-27 RX ORDER — ONDANSETRON 4 MG/1
4 TABLET, ORALLY DISINTEGRATING ORAL EVERY 8 HOURS PRN
Status: DISCONTINUED | OUTPATIENT
Start: 2025-01-27 | End: 2025-01-27 | Stop reason: CLARIF

## 2025-01-27 RX ORDER — GABAPENTIN 300 MG/1
600 CAPSULE ORAL 3 TIMES DAILY
Status: DISCONTINUED | OUTPATIENT
Start: 2025-01-27 | End: 2025-02-04 | Stop reason: HOSPADM

## 2025-01-27 RX ORDER — METOCLOPRAMIDE HYDROCHLORIDE 5 MG/ML
10 INJECTION INTRAMUSCULAR; INTRAVENOUS ONCE
Status: COMPLETED | OUTPATIENT
Start: 2025-01-27 | End: 2025-01-27

## 2025-01-27 RX ORDER — ASPIRIN 81 MG/1
81 TABLET, CHEWABLE ORAL DAILY
Status: DISCONTINUED | OUTPATIENT
Start: 2025-01-28 | End: 2025-02-04 | Stop reason: HOSPADM

## 2025-01-27 RX ADMIN — SODIUM CHLORIDE 1000 ML: 9 INJECTION, SOLUTION INTRAVENOUS at 22:18

## 2025-01-27 RX ADMIN — IOPAMIDOL 120 ML: 755 INJECTION, SOLUTION INTRAVENOUS at 16:14

## 2025-01-27 RX ADMIN — ASPIRIN 300 MG: 300 SUPPOSITORY RECTAL at 16:43

## 2025-01-27 RX ADMIN — SODIUM CHLORIDE: 9 INJECTION, SOLUTION INTRAVENOUS at 16:48

## 2025-01-27 RX ADMIN — METOCLOPRAMIDE 10 MG: 5 INJECTION, SOLUTION INTRAMUSCULAR; INTRAVENOUS at 22:41

## 2025-01-27 RX ADMIN — DIPHENHYDRAMINE HYDROCHLORIDE 25 MG: 50 INJECTION INTRAMUSCULAR; INTRAVENOUS at 22:46

## 2025-01-27 NOTE — ED PROVIDER NOTES
[facility] EMERGENCY DEPARTMENT  EMERGENCY DEPARTMENT ENCOUNTER        Pt Name: Frankie Hernández  MRN: [id number]  Birthdate 1945  Date of evaluation: 1/27/2025  Provider: Stanton Wilder MD  PCP: Minor Cagle MD  Note Started: 4:06 PM EST 1/27/25    CHIEF COMPLAINT & HISTORY     Chief Complaint   Patient presents with    Cerebrovascular Accident     L sided facial droop, unable to follow commands, slurred speech, LKW 13:30 today         History From: pt  Frankie Hernández is a 80 y.o. male w/pmhx of DVT/PE on eliquis, Parkinsons, presents with Left facial droop with LAST KNOWN WELL OF 1330 1/27/25 (TODAY). Per family he was saying \"headache\" repeatedly and had left sided droop while in the living room. At baseline he can speak but has issues due to his parkinsons. He is usually able to ambulate as well.  On arrival he cannot give history. Per EMS he was repeatedly saying \"headache\" and had facial droop still.       Medical Decision Making/Differential Diagnosis/ED Course:    CC/HPI Summary, Social Determinants of health, Records Reviewed, DDx, testing done/not done, ED Course, Reassessment, disposition considerations/shared decision making with patient, consults, disposition:      Is this patient to be included in the SEP-1 core measure? No Exclusion criteria - the patient is NOT to be included for SEP-1 Core Measure due to: Infection is not suspected      ED Course as of 01/27/25 2119   Mon Jan 27, 2025   1632 EKG:  This EKG is signed and interpreted by me.    Rate: 97  Rhythm: Sinus  Interpretation: no acute changes, no acute ST elevations, right bundle branch block noted, QTc slightly prolonged at 500,   Comparison: stable as compared to patient's most recent EKG On 10/8/2024 [MB]   1711 80-year-old gentleman past medical history of prior pulmonary embolism currently on Eliquis, diabetes, Parkinson's presents with concern for altered mental status, left-sided facial droop

## 2025-01-27 NOTE — ED NOTES
Stroke/ Lupe Alert Time:stroke @ 1606      Time Neurologist called:Dr Chang @ 1608  :    Time CT Notified:       BRAIN alert time: (If applicable)

## 2025-01-27 NOTE — VIRTUAL HEALTH
Telephone -Telestroke consultation for the ER physician only , for inpatient care and follow up please consult general neurology team   For outpatient follow up a general neurology referral needs to be made   I was contacted by the ED team to review an acute code stroke cerebral vasculature imaging study to investigate if there is a large vessel occlusion. The patient has a NIHSS of 8 per report by the ER team.  Aphasia     Head CT is negative for ICH      The last known well time was reported as  The cerebral vascular imaging demonstrates left mca inferior m3 LVO.    The cerebral perfusion shows and confirms stroke       Assessment   Acute ischemic left mca m3 occlusion    Plan:  1. Not a tnk or endovascular candidate discussed with ER team, on Eliquis  Clot is too distal to intervene   2. asa   3. Mri brain if possible   4. PT OT ST  5 cardiac workup     TIME SPENT IN MEDICAL DECISION MAKING / REVIEW OF CASE AND IMAGING = 32 min

## 2025-01-28 ENCOUNTER — APPOINTMENT (OUTPATIENT)
Dept: MRI IMAGING | Age: 80
DRG: 065 | End: 2025-01-28
Payer: MEDICARE

## 2025-01-28 LAB
ANION GAP SERPL CALCULATED.3IONS-SCNC: 12 MMOL/L (ref 7–16)
BUN SERPL-MCNC: 14 MG/DL (ref 6–23)
CALCIUM SERPL-MCNC: 9.2 MG/DL (ref 8.6–10.2)
CHLORIDE SERPL-SCNC: 107 MMOL/L (ref 98–107)
CHOLEST SERPL-MCNC: 145 MG/DL
CO2 SERPL-SCNC: 24 MMOL/L (ref 22–29)
CREAT SERPL-MCNC: 1.3 MG/DL (ref 0.7–1.2)
ERYTHROCYTE [DISTWIDTH] IN BLOOD BY AUTOMATED COUNT: 13 % (ref 11.5–15)
GFR, ESTIMATED: 55 ML/MIN/1.73M2
GLUCOSE BLD-MCNC: 137 MG/DL (ref 74–99)
GLUCOSE BLD-MCNC: 161 MG/DL (ref 74–99)
GLUCOSE BLD-MCNC: 244 MG/DL (ref 74–99)
GLUCOSE SERPL-MCNC: 145 MG/DL (ref 74–99)
HBA1C MFR BLD: 7.3 % (ref 4–5.6)
HCT VFR BLD AUTO: 42.4 % (ref 37–54)
HDLC SERPL-MCNC: 62 MG/DL
HGB BLD-MCNC: 13.9 G/DL (ref 12.5–16.5)
LDLC SERPL CALC-MCNC: 67 MG/DL
MCH RBC QN AUTO: 31.9 PG (ref 26–35)
MCHC RBC AUTO-ENTMCNC: 32.8 G/DL (ref 32–34.5)
MCV RBC AUTO: 97.2 FL (ref 80–99.9)
PLATELET # BLD AUTO: 124 K/UL (ref 130–450)
PMV BLD AUTO: 10.1 FL (ref 7–12)
POTASSIUM SERPL-SCNC: 4.1 MMOL/L (ref 3.5–5)
RBC # BLD AUTO: 4.36 M/UL (ref 3.8–5.8)
SODIUM SERPL-SCNC: 143 MMOL/L (ref 132–146)
TRIGL SERPL-MCNC: 80 MG/DL
TROPONIN I SERPL HS-MCNC: 26 NG/L (ref 0–11)
TROPONIN I SERPL HS-MCNC: 27 NG/L (ref 0–11)
VLDLC SERPL CALC-MCNC: 16 MG/DL
WBC OTHER # BLD: 6.4 K/UL (ref 4.5–11.5)

## 2025-01-28 PROCEDURE — 85027 COMPLETE CBC AUTOMATED: CPT

## 2025-01-28 PROCEDURE — 97166 OT EVAL MOD COMPLEX 45 MIN: CPT

## 2025-01-28 PROCEDURE — 6370000000 HC RX 637 (ALT 250 FOR IP)

## 2025-01-28 PROCEDURE — 84484 ASSAY OF TROPONIN QUANT: CPT

## 2025-01-28 PROCEDURE — 2060000000 HC ICU INTERMEDIATE R&B

## 2025-01-28 PROCEDURE — 6360000002 HC RX W HCPCS: Performed by: INTERNAL MEDICINE

## 2025-01-28 PROCEDURE — 70551 MRI BRAIN STEM W/O DYE: CPT

## 2025-01-28 PROCEDURE — 97161 PT EVAL LOW COMPLEX 20 MIN: CPT

## 2025-01-28 PROCEDURE — 97535 SELF CARE MNGMENT TRAINING: CPT

## 2025-01-28 PROCEDURE — 93005 ELECTROCARDIOGRAM TRACING: CPT | Performed by: NURSE PRACTITIONER

## 2025-01-28 PROCEDURE — 80061 LIPID PANEL: CPT

## 2025-01-28 PROCEDURE — 97530 THERAPEUTIC ACTIVITIES: CPT

## 2025-01-28 PROCEDURE — 80048 BASIC METABOLIC PNL TOTAL CA: CPT

## 2025-01-28 PROCEDURE — 6360000002 HC RX W HCPCS: Performed by: EMERGENCY MEDICINE

## 2025-01-28 PROCEDURE — 36415 COLL VENOUS BLD VENIPUNCTURE: CPT

## 2025-01-28 PROCEDURE — 2500000003 HC RX 250 WO HCPCS

## 2025-01-28 PROCEDURE — 83036 HEMOGLOBIN GLYCOSYLATED A1C: CPT

## 2025-01-28 PROCEDURE — 6370000000 HC RX 637 (ALT 250 FOR IP): Performed by: FAMILY MEDICINE

## 2025-01-28 PROCEDURE — 6360000002 HC RX W HCPCS: Performed by: NURSE PRACTITIONER

## 2025-01-28 PROCEDURE — 82962 GLUCOSE BLOOD TEST: CPT

## 2025-01-28 RX ORDER — LORAZEPAM 2 MG/ML
0.5 INJECTION INTRAMUSCULAR ONCE
Status: COMPLETED | OUTPATIENT
Start: 2025-01-29 | End: 2025-01-28

## 2025-01-28 RX ORDER — DIPHENHYDRAMINE HYDROCHLORIDE 50 MG/ML
25 INJECTION INTRAMUSCULAR; INTRAVENOUS ONCE
Status: COMPLETED | OUTPATIENT
Start: 2025-01-28 | End: 2025-01-28

## 2025-01-28 RX ORDER — LORAZEPAM 2 MG/ML
0.5 INJECTION INTRAMUSCULAR ONCE
Status: COMPLETED | OUTPATIENT
Start: 2025-01-28 | End: 2025-01-28

## 2025-01-28 RX ORDER — DEXTROSE MONOHYDRATE 100 MG/ML
INJECTION, SOLUTION INTRAVENOUS CONTINUOUS PRN
Status: DISCONTINUED | OUTPATIENT
Start: 2025-01-28 | End: 2025-01-31 | Stop reason: SDUPTHER

## 2025-01-28 RX ORDER — DEXTROSE MONOHYDRATE 25 G/50ML
12.5 INJECTION, SOLUTION INTRAVENOUS PRN
Status: DISCONTINUED | OUTPATIENT
Start: 2025-01-28 | End: 2025-01-31 | Stop reason: SDUPTHER

## 2025-01-28 RX ORDER — GLUCAGON 1 MG/ML
1 KIT INJECTION PRN
Status: DISCONTINUED | OUTPATIENT
Start: 2025-01-28 | End: 2025-02-04 | Stop reason: HOSPADM

## 2025-01-28 RX ORDER — NITROGLYCERIN 0.4 MG/1
0.4 TABLET SUBLINGUAL EVERY 5 MIN PRN
Status: DISCONTINUED | OUTPATIENT
Start: 2025-01-28 | End: 2025-02-04 | Stop reason: HOSPADM

## 2025-01-28 RX ORDER — INSULIN LISPRO 100 [IU]/ML
0-4 INJECTION, SOLUTION INTRAVENOUS; SUBCUTANEOUS
Status: DISCONTINUED | OUTPATIENT
Start: 2025-01-28 | End: 2025-02-04 | Stop reason: HOSPADM

## 2025-01-28 RX ADMIN — SODIUM CHLORIDE, PRESERVATIVE FREE 5 ML: 5 INJECTION INTRAVENOUS at 06:13

## 2025-01-28 RX ADMIN — RASAGILINE 1 MG: 1 TABLET ORAL at 09:47

## 2025-01-28 RX ADMIN — LORAZEPAM 0.5 MG: 2 INJECTION INTRAMUSCULAR; INTRAVENOUS at 23:48

## 2025-01-28 RX ADMIN — AMLODIPINE BESYLATE 5 MG: 5 TABLET ORAL at 09:31

## 2025-01-28 RX ADMIN — SODIUM CHLORIDE, PRESERVATIVE FREE 10 ML: 5 INJECTION INTRAVENOUS at 20:03

## 2025-01-28 RX ADMIN — POLYETHYLENE GLYCOL 3350 17 G: 17 POWDER, FOR SOLUTION ORAL at 16:00

## 2025-01-28 RX ADMIN — GLIPIZIDE 5 MG: 5 TABLET ORAL at 20:03

## 2025-01-28 RX ADMIN — ASPIRIN 81 MG CHEWABLE TABLET 81 MG: 81 TABLET CHEWABLE at 09:30

## 2025-01-28 RX ADMIN — CARBIDOPA AND LEVODOPA 2 TABLET: 25; 100 TABLET ORAL at 16:58

## 2025-01-28 RX ADMIN — INSULIN LISPRO 2 UNITS: 100 INJECTION, SOLUTION INTRAVENOUS; SUBCUTANEOUS at 16:58

## 2025-01-28 RX ADMIN — APIXABAN 5 MG: 5 TABLET, FILM COATED ORAL at 20:04

## 2025-01-28 RX ADMIN — CARBIDOPA AND LEVODOPA 2 TABLET: 25; 100 TABLET ORAL at 09:30

## 2025-01-28 RX ADMIN — GABAPENTIN 600 MG: 300 CAPSULE ORAL at 09:30

## 2025-01-28 RX ADMIN — GABAPENTIN 600 MG: 300 CAPSULE ORAL at 20:03

## 2025-01-28 RX ADMIN — GABAPENTIN 600 MG: 300 CAPSULE ORAL at 14:45

## 2025-01-28 RX ADMIN — ATORVASTATIN CALCIUM 40 MG: 40 TABLET, FILM COATED ORAL at 20:03

## 2025-01-28 RX ADMIN — DIPHENHYDRAMINE HYDROCHLORIDE 25 MG: 50 INJECTION INTRAMUSCULAR; INTRAVENOUS at 06:13

## 2025-01-28 RX ADMIN — APIXABAN 5 MG: 5 TABLET, FILM COATED ORAL at 09:30

## 2025-01-28 RX ADMIN — LORAZEPAM 0.5 MG: 2 INJECTION INTRAMUSCULAR; INTRAVENOUS at 18:33

## 2025-01-28 NOTE — PROGRESS NOTES
Physical Therapy  Physical Therapy Initial Assessment       Name: Frankie Hernández  : 1945  MRN: [id number]      Date of Service: 2025    Evaluating PT:  Ashashae Cota PT, DPT [id number]    Room #:  8501/8501-B  Diagnosis:  Stroke due to occlusion of left middle cerebral artery (HCC) [I63.512]  Cerebrovascular accident (CVA) due to occlusion of middle cerebral artery, unspecified blood vessel laterality (HCC) [I63.519]  PMHx/PSHx:   has a past medical history of Acute left lumbar radiculopathy, YUDY (acute kidney injury) (HCC), Anxiety and depression, Cancer (HCC), Cerebral atrophy (HCC), Diabetes mellitus (HCC), Disc displacement, lumbar, History of pulmonary embolus (PE), Hx of deep venous thrombosis, Kidney disease, Nerve injury, Parkinson disease (HCC), Parkinson disease (HCC), Pneumonia, Pulmonary embolism (HCC), Renal carcinoma (HCC), Rhinovirus infection, and 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    Procedure/Surgery:  none this admission  Precautions: Falls,  speech, Hx parkinson's disease, +alarms, bedside sitter     SUBJECTIVE:    Per chart, Pt lives with his wife in a 1 story home with 1+1 stairs to enter and ? rail(s).  Bed is on 1st floor and bath is on 1st floor.  Pt ambulated with ? PTA. Pt appears to be aphasic, unable to contribute to subjective exam.     Equipment Owned: rollator, WW, cane  Equipment Needs:  TBD    OBJECTIVE:   Initial Evaluation  Date: 25 Treatment  Date:  Short Term/ Long Term   Goals   AM-PAC 6 Clicks 15/24 - pt would benefit from intensive rehabilitation program      Was pt agreeable to Eval/treatment? Yes      Does pt have pain? No complaints      Bed Mobility  Rolling: Perry  Supine to sit: Perry  Sit to supine: NT  Scooting: Perry  Rolling: Independent  Supine to sit: Independent  Sit to supine: Independent  Scooting: Independent   Transfers Sit to stand: Perry  Stand to sit: Perry  Stand pivot: ModA no AD  Sit to stand: SBA  Stand to sit: SBA  Stand  improve static/dynamic balance and to reduce fall risk  [x] Endurance Training to improve activity tolerance during functional mobility   [x] Transfer Training to improve safety and independence with all functional transfers   [x] Gait Training to improve gait mechanics, endurance and assess need for appropriate assistive device  [x] Stair Training in preparation for safe discharge home and/or into the community   [x] Positioning to prevent skin breakdown and contractures  [x] Safety and Education Training   [x] Patient/Caregiver Education   [x] HEP  [] Other     PT long term treatment goals are located in above grid    Frequency of treatments: 2-5x/week x 1-2 weeks.    Time in  1334  Time out  1357    Total Treatment Time  8 minutes     Evaluation Time includes thorough review of current medical information, gathering information on past medical history/social history and prior level of function, completion of standardized testing/informal observation of tasks, assessment of data and education on plan of care and goals.    CPT codes:  [x] Low Complexity PT evaluation 11938  [] Moderate Complexity PT evaluation 32154  [] High Complexity PT evaluation 48949  [] PT Re-evaluation 64392  [] Gait training 73421 0 minutes  [] Manual therapy 30004 0 minutes  [x] Therapeutic activities 80537 8 minutes  [] Therapeutic exercises 03751 0 minutes  [] Neuromuscular reeducation 97018 0 minutes       Asha Cota PT, DPT  [id number]

## 2025-01-28 NOTE — PROGRESS NOTES
4 Eyes Skin Assessment     NAME:  Frankie Hernández  YOB: 1945  MEDICAL RECORD NUMBER:  [id number]    The patient is being assessed for  Admission    I agree that at least one RN has performed a thorough Head to Toe Skin Assessment on the patient. ALL assessment sites listed below have been assessed.      Areas assessed by both nurses:    Head, Face, Ears, Shoulders, Back, Chest, Arms, Elbows, Hands, Sacrum. Buttock, Coccyx, Ischium, and Legs. Feet and Heels        Does the Patient have a Wound? No noted wound(s)       Ray Prevention initiated by RN: Yes  Wound Care Orders initiated by RN: No    Pressure Injury (Stage 3,4, Unstageable, DTI, NWPT, and Complex wounds) if present, place Wound referral order by RN under : Yes    New Ostomies, if present place, Ostomy referral order under : No     Nurse 1 eSignature: Electronically signed by Kirsten Figueredo RN on 1/28/25 at 3:31 PM EST    **SHARE this note so that the co-signing nurse can place an eSignature**    Nurse 2 eSignature: {Esignature:[id number]}

## 2025-01-28 NOTE — ACP (ADVANCE CARE PLANNING)
Advance Care Planning   Healthcare Decision Maker:    Primary Decision Maker: Nina Hernández - Spouse - [phone]    Secondary Decision Maker: Pamela Musa - Child - [phone]    Secondary Decision Maker (Active): IshaFarzana contreras - Child - [phone]    Click here to complete Healthcare Decision Makers including selection of the Healthcare Decision Maker Relationship (ie \"Primary\").          wife to bring in dpoa papers. LAURIE Pulido  1/28/2025

## 2025-01-28 NOTE — ED NOTES
I was called to the bedside due to elevated blood pressure and the patient complaining of his severe headache.  Neurologic exam is not changed.  However he was diagnosed with an ischemic stroke today; will obtain stat CT head to rule out any signs of hemorrhagic conversion.  He was ordered Reglan and Benadryl to treat his headache.  Primary admitting team is aware per RN.    11:26 PM        CT head without contrast grossly reviewed by me; there is motion artifact, I do not see any obvious signs of intracranial hemorrhage.  Patient's symptoms seem to have improved after Reglan and Benadryl.  I did let the overnight attending know that the CT head without contrast is pending.  The primary admitting team is also aware.  He he is admitted and boarding in our ER.  He continues to be monitored closely.    1:26 AM       Laurie Head DO  01/28/25 0126

## 2025-01-28 NOTE — PROGRESS NOTES
Notified Dr. Reese, pt needs meds prior to MRI.    Orders given to give 0.5 mg IV once prior to MRI brain per Dr. Reese.

## 2025-01-28 NOTE — H&P
[facility]  History and Physical      CHIEF COMPLAINT:    Chief Complaint   Patient presents with    Cerebrovascular Accident     L sided facial droop, unable to follow commands, slurred speech, LKW 13:30 today        Patient of Minor Cagle MD presents with:  Stroke due to occlusion of left middle cerebral artery (HCC)    History of Present Illness:   Patient is an 80-year-old male significant past medical history of YUDY, diabetes, DVT, Parkinson's, PE, renal carcinoma, rhinovirus.  Patient presented to the ED with complaints of left-sided facial droop.  Patient's family was sitting and he was complaining of a headache that was repeated and he had left-sided facial droop while in the living room.  Patient can speak but has issues due to his Parkinson's.  Patient baseline for ambulation as well.  Patient cannot give a history in the ED he just keeps repeating he has a headache.  Patient's NIHSS score in the ED was 8.  Stroke alert was called and imaging revealed acute ischemic left MCA M3 occlusion.  Patient is not a TNK candidate or endovascular candidate.  Patient is currently on Eliquis due to his history of blood clots.  Neurology recommendations is aspirin, admission to telemetry unit for further workup.  Neurology will continue to follow.  On evaluation he is resting comfortably in no acute distress.  He denies any chest heaviness pain or discomfort-his wife is at the bedside and assists with history.  She indicates he was grabbing at his chest to try to pull all of the stickers from telemetry monitoring.  He denies any chest heaviness or discomfort to me.  He is resting comfortably with normal oxygen saturations and normal vital signs.  No motor deficits on exam however he is having difficulty getting his words out.        REVIEW OF SYSTEMS:  Pertinent negatives are above in HPI.  10 point ROS otherwise negative.      Past Medical History:   Diagnosis Date    Acute left lumbar

## 2025-01-28 NOTE — CARE COORDINATION
I met with pt , wife, daughter, and son in law this afternoon. Pt is from home in a ranch condo with wife. They have 1 step to enter from the garage into the home. The couple has 2 grown children with daughter, josette hodges who works at [employer]. And a son in [place]. Pt is retired. No hhc pta. His pcp is Dr. Minor Cagle and he saw him about 2 weeks ago in the office. Pt has a fww, cane, rollator, shower chair and higher toilets in the home. There is a walk in shower with rails. Pt no longer drives. Wife cooks the meals and helps with medications. No casey hx. If hhc is needed they have used [facility] and want to use them again. Pt is diabetic pta no on insulin but has a glucometer. OT  to eval. PT recommends aru. I have offered [facility] but [facility], referral made. Precert is needed. MRI of the brain is pending. Neuro IR is following. Speech to eval. Pt is on 2l nc with out home o2. LAURIE Pulido  1/28/2025  [facility] is out of network. Daughter wants #1 [facility]-referral made to both,  with [facility] #3 but they have no beds..LAURIE Pulido  .1/28/2025      The Plan for Transition of Care is related to the following treatment goals: casey and aru     The Patient and/or patient representative  was provided with a choice of provider and agrees   with the discharge plan. [x] Yes [] No  Freedom of choice list was provided with basic dialogue that supports the patient's individualized plan of care/goals, treatment preferences and shares the quality data associated with the providers. [x] Yes [] No     Case Management Assessment  Initial Evaluation    Date/Time of Evaluation: 1/28/2025 2:40 PM  Assessment Completed by: LAURIE Pulido    If patient is discharged prior to next notation, then this note serves as note for discharge by case management.    Patient Name: Frankie Hernández                   YOB: 1945  Diagnosis:

## 2025-01-28 NOTE — PROGRESS NOTES
OCCUPATIONAL THERAPY INITIAL EVALUATION    [facility] [address], [place]       Date:2025                                                  Patient Name: Frankie Hernández  MRN: [id number]  : 1945  Room: [address] [place] [address]    Evaluating OT: Pedro Goodman OTR/L [id number]    Referring Provider: Sofia Healy APRN - CNP      Specific Provider Orders/Date: OT evaluation and treatment 25 5160    Diagnosis:  Stroke due to occlusion of left middle cerebral artery (HCC) [I63.512]  Cerebrovascular accident (CVA) due to occlusion of middle cerebral artery, unspecified blood vessel laterality (HCC) [I63.519]      Pertinent Medical History:  has a past medical history of Acute left lumbar radiculopathy, YUDY (acute kidney injury) (HCC), Anxiety and depression, Cancer (HCC), Cerebral atrophy (HCC), Diabetes mellitus (HCC), Disc displacement, lumbar, History of pulmonary embolus (PE), Hx of deep venous thrombosis, Kidney disease, Nerve injury, Parkinson disease (HCC), Parkinson disease (HCC), Pneumonia, Pulmonary embolism (HCC), Renal carcinoma (HCC), Rhinovirus infection, and 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   Past Surgical History:   Procedure Laterality Date    BACK SURGERY      CATARACT REMOVAL      ENDOSCOPY, COLON, DIAGNOSTIC      EYE SURGERY      INVASIVE VASCULAR N/A 2024    Vena cava filter insertion - cath lab performed by Rafal Skelton MD at [facility] CARDIAC CATH LAB    INVASIVE VASCULAR N/A 2024    Remove vena cava filter performed by Rafal Skelton MD at [facility] CARDIAC CATH LAB    [facility]    [facility]  2014    [facility]    KNEE SURGERY Left     TOTAL NEPHRECTOMY Right 2013    Renal cell carcinoma       Pt presented to the emergency dept on  with L facial droop and headache   MRI brain pending     Orders received, chart reviewed, eval complete.

## 2025-01-28 NOTE — DISCHARGE INSTR - COC
Continuity of Care Form    Patient Name: Frankie Hernández   :  1945  MRN:  [id number]    Admit date:  2025  Discharge date:  25    Code Status Order: Full Code   Advance Directives:   Advance Care Flowsheet Documentation             Admitting Physician:  Merly Reese MD  PCP: Minor Cagle MD    Discharging Nurse: h  Discharging Hospital Unit/Room#: 8501/8501-B  Discharging Unit Phone Number: [phone]      Emergency Contact:   Extended Emergency Contact Information  Primary Emergency Contact: Nina Hernández  Address: [address] [place] [address]  Home Phone: [phone]  Mobile Phone: [phone]  Relation: Spouse   needed? No  Secondary Emergency Contact: Pamela Musa  Home Phone: [phone]  Mobile Phone: [phone]  Relation: Child  Preferred language: English   needed? No    Past Surgical History:  Past Surgical History:   Procedure Laterality Date    BACK SURGERY      CATARACT REMOVAL      ENDOSCOPY, COLON, DIAGNOSTIC      EYE SURGERY      INVASIVE VASCULAR N/A 2024    Vena cava filter insertion - cath lab performed by Rafal Skelton MD at [facility]ARDI[facility] CATH LAB    INVASIVE VASCULAR N/A 2024    Remove vena cava filter performed by Rafal Skelton MD at [facility]ARDIAC CATH LAB    [facility]    [facility] FILTER REM[facility]AL  2014    [facility]    KNEE SURGERY Left     TOTAL NEPHRECTOMY Right 2013    Renal cell carcinoma       Immunization History:   Immunization History   Administered Date(s) Administered    COVID-19, PFIZER Bivalent, DO NOT Dilute, (age 12y+), IM, 30 mcg/0.3 mL 2022    COVID-19, PFIZER GRAY top, DO NOT Dilute, (age 12 y+), IM, 30 mcg/0.3 mL 2022    COVID-19, PFIZER PURPLE top, DILUTE for use, (age 12 y+), 30mcg/0.3mL 2021, 2021, 10/01/2021    COVID-19, PFIZER, (age 12y+), IM, 30mcg/0.3mL 2023, 2024       Active Problems:  Patient Active  Problem List   Diagnosis Code    DVT (deep venous thrombosis) ([facility]) I82.409    Pulmonary embolism ([facility]) I26.99    Renal carcinoma ([facility]) C64.9    Anxiety and depression F41.9, F32.A    Parkinson disease ([facility]) G20.A1    CKD (chronic kidney disease) stage 3, GFR 30-59 ml/min N18.30    YUDY (acute kidney injury) ([facility]) N17.9    Hx of deep venous thrombosis Z86.718    Dyspnea R06.00    History of pulmonary embolus (PE) Z86.711    Rhinovirus infection B34.8    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 Z95.828    Acute left lumbar radiculopathy M54.16    Cerebral atrophy ([facility]) G31.9    Unable to ambulate R26.2    Vertigo R42    Acute hypoxic respiratory failure J96.01    Acute respiratory failure with hypoxia J96.01    CAP (community acquired pneumonia) J18.9    Respiratory failure J96.90    Syncope and collapse R55    Acute ischemic left MCA stroke ([facility]) I63.512    Stroke due to occlusion of left middle cerebral artery ([facility]) I63.512       Isolation/Infection:   Isolation            No Isolation          Patient Infection Status       None to display                     Nurse Assessment:  Last Vital Signs: BP (!) 143/90   Pulse (!) 103   Temp 97.8 °F (36.6 °C) (Oral)   Resp 18   Wt 119.2 kg (262 lb 12.8 oz)   SpO2 100%   BMI 35.64 kg/m²     Last documented pain score (0-10 scale):    Last Weight:   Wt Readings from Last 1 Encounters:   01/27/25 119.2 kg (262 lb 12.8 oz)     Mental Status:  disoriented and alert    IV Access:  - None    Nursing Mobility/ADLs:  Walking   Assisted  Transfer  Assisted  Bathing  Assisted  Dressing  Assisted  Toileting  Assisted  Feeding  Assisted  Med Admin  Assisted  Med Delivery   whole and prefers mixed with pudding    Wound Care Documentation and Therapy:        Elimination:  Continence:   Bowel: No  Bladder: No  Urinary Catheter: None   Colostomy/Ileostomy/Ileal Conduit: No       Date of Last BM: 2/3/25    Intake/Output Summary (Last 24 hours) at 1/28/2025 1530  Last data filed

## 2025-01-28 NOTE — ED NOTES
Per patients daughter Pamela, patient eats and drinks regularly at home. Has some trouble due to Parkinsons with bigger meals. Patients wife stated that he is hungry and trying to eat everything in the room. AT this time RN gave patient pudding to start with. Patient able to swallow pudding without any complications at this time.

## 2025-01-28 NOTE — ED NOTES
Per patients wife, she was insisting patient drinks some water. This RN informed the wife that I need to do a swallow screen before patient can get water. Patient was able to drink 3 oz of water without any complications at this time

## 2025-01-28 NOTE — PLAN OF CARE

## 2025-01-28 NOTE — ED NOTES
This RN got called into patients room due to patient trying to get out of bed and ripping off monitors and trying to rip out IV. Patient saying \"let me go home\" Patient becoming physically aggressive with wife at bedside.

## 2025-01-29 ENCOUNTER — APPOINTMENT (OUTPATIENT)
Age: 80
DRG: 065 | End: 2025-01-29
Payer: MEDICARE

## 2025-01-29 LAB
ANION GAP SERPL CALCULATED.3IONS-SCNC: 14 MMOL/L (ref 7–16)
BUN SERPL-MCNC: 14 MG/DL (ref 6–23)
CALCIUM SERPL-MCNC: 9.4 MG/DL (ref 8.6–10.2)
CHLORIDE SERPL-SCNC: 107 MMOL/L (ref 98–107)
CO2 SERPL-SCNC: 23 MMOL/L (ref 22–29)
CREAT SERPL-MCNC: 1.3 MG/DL (ref 0.7–1.2)
ECHO AO ASC DIAM: 3.6 CM
ECHO AV AREA PEAK VELOCITY: 2.7 CM2
ECHO AV AREA VTI: 2.4 CM2
ECHO AV CUSP MM: 1.9 CM
ECHO AV MEAN GRADIENT: 7 MMHG
ECHO AV MEAN VELOCITY: 1.3 M/S
ECHO AV PEAK GRADIENT: 13 MMHG
ECHO AV PEAK VELOCITY: 1.8 M/S
ECHO AV VELOCITY RATIO: 0.78
ECHO AV VTI: 35.1 CM
ECHO EST RA PRESSURE: 3 MMHG
ECHO LA DIAMETER: 3.3 CM
ECHO LA VOL A-L A2C: 50 ML (ref 18–58)
ECHO LA VOL A-L A4C: 36 ML (ref 18–58)
ECHO LA VOL MOD A2C: 48 ML (ref 18–58)
ECHO LA VOL MOD A4C: 35 ML (ref 18–58)
ECHO LA VOLUME AREA LENGTH: 45 ML
ECHO LV EF PHYSICIAN: 55 %
ECHO LV FRACTIONAL SHORTENING: 37 % (ref 28–44)
ECHO LV INTERNAL DIMENSION DIASTOLIC: 5.4 CM (ref 4.2–5.9)
ECHO LV INTERNAL DIMENSION SYSTOLIC: 3.4 CM
ECHO LV ISOVOLUMETRIC RELAXATION TIME (IVRT): 129.2 MS
ECHO LV IVSD: 1.6 CM (ref 0.6–1)
ECHO LV MASS 2D: 380.5 G (ref 88–224)
ECHO LV POSTERIOR WALL DIASTOLIC: 1.5 CM (ref 0.6–1)
ECHO LV RELATIVE WALL THICKNESS RATIO: 0.56
ECHO LVOT AREA: 3.5 CM2
ECHO LVOT AV VTI INDEX: 0.68
ECHO LVOT DIAM: 2.1 CM
ECHO LVOT MEAN GRADIENT: 4 MMHG
ECHO LVOT PEAK GRADIENT: 7 MMHG
ECHO LVOT PEAK VELOCITY: 1.4 M/S
ECHO LVOT SV: 82.4 ML
ECHO LVOT VTI: 23.8 CM
ECHO MV "A" WAVE DURATION: 96.9 MSEC
ECHO MV A VELOCITY: 1.08 M/S
ECHO MV AREA PHT: 5.7 CM2
ECHO MV AREA VTI: 3.3 CM2
ECHO MV E DECELERATION TIME (DT): 270.2 MS
ECHO MV E VELOCITY: 0.55 M/S
ECHO MV E/A RATIO: 0.51
ECHO MV LVOT VTI INDEX: 1.04
ECHO MV MAX VELOCITY: 1.3 M/S
ECHO MV MEAN GRADIENT: 4 MMHG
ECHO MV MEAN VELOCITY: 0.9 M/S
ECHO MV PEAK GRADIENT: 7 MMHG
ECHO MV PRESSURE HALF TIME (PHT): 38.4 MS
ECHO MV VTI: 24.7 CM
ECHO PV MAX VELOCITY: 1.3 M/S
ECHO PV MEAN GRADIENT: 3 MMHG
ECHO PV MEAN VELOCITY: 0.9 M/S
ECHO PV PEAK GRADIENT: 7 MMHG
ECHO PV VTI: 22.8 CM
ECHO RIGHT VENTRICULAR SYSTOLIC PRESSURE (RVSP): 22 MMHG
ECHO RV INTERNAL DIMENSION: 3.7 CM
ECHO TV REGURGITANT MAX VELOCITY: 2.16 M/S
ECHO TV REGURGITANT PEAK GRADIENT: 19 MMHG
ERYTHROCYTE [DISTWIDTH] IN BLOOD BY AUTOMATED COUNT: 12.9 % (ref 11.5–15)
GFR, ESTIMATED: 53 ML/MIN/1.73M2
GLUCOSE BLD-MCNC: 144 MG/DL (ref 74–99)
GLUCOSE BLD-MCNC: 151 MG/DL (ref 74–99)
GLUCOSE BLD-MCNC: 157 MG/DL (ref 74–99)
GLUCOSE BLD-MCNC: 92 MG/DL (ref 74–99)
GLUCOSE SERPL-MCNC: 93 MG/DL (ref 74–99)
HCT VFR BLD AUTO: 43.3 % (ref 37–54)
HGB BLD-MCNC: 14.2 G/DL (ref 12.5–16.5)
MCH RBC QN AUTO: 32 PG (ref 26–35)
MCHC RBC AUTO-ENTMCNC: 32.8 G/DL (ref 32–34.5)
MCV RBC AUTO: 97.5 FL (ref 80–99.9)
PLATELET # BLD AUTO: 121 K/UL (ref 130–450)
PMV BLD AUTO: 9.8 FL (ref 7–12)
POTASSIUM SERPL-SCNC: 4 MMOL/L (ref 3.5–5)
RBC # BLD AUTO: 4.44 M/UL (ref 3.8–5.8)
SODIUM SERPL-SCNC: 144 MMOL/L (ref 132–146)
TROPONIN I SERPL HS-MCNC: 30 NG/L (ref 0–11)
WBC OTHER # BLD: 7.3 K/UL (ref 4.5–11.5)

## 2025-01-29 PROCEDURE — 93306 TTE W/DOPPLER COMPLETE: CPT | Performed by: INTERNAL MEDICINE

## 2025-01-29 PROCEDURE — 6360000002 HC RX W HCPCS: Performed by: NURSE PRACTITIONER

## 2025-01-29 PROCEDURE — 97530 THERAPEUTIC ACTIVITIES: CPT

## 2025-01-29 PROCEDURE — 85027 COMPLETE CBC AUTOMATED: CPT

## 2025-01-29 PROCEDURE — 36415 COLL VENOUS BLD VENIPUNCTURE: CPT

## 2025-01-29 PROCEDURE — 97535 SELF CARE MNGMENT TRAINING: CPT

## 2025-01-29 PROCEDURE — 2500000003 HC RX 250 WO HCPCS

## 2025-01-29 PROCEDURE — 80048 BASIC METABOLIC PNL TOTAL CA: CPT

## 2025-01-29 PROCEDURE — 84484 ASSAY OF TROPONIN QUANT: CPT

## 2025-01-29 PROCEDURE — 99222 1ST HOSP IP/OBS MODERATE 55: CPT | Performed by: NURSE PRACTITIONER

## 2025-01-29 PROCEDURE — 92610 EVALUATE SWALLOWING FUNCTION: CPT | Performed by: SPEECH-LANGUAGE PATHOLOGIST

## 2025-01-29 PROCEDURE — 82962 GLUCOSE BLOOD TEST: CPT

## 2025-01-29 PROCEDURE — 92526 ORAL FUNCTION THERAPY: CPT | Performed by: SPEECH-LANGUAGE PATHOLOGIST

## 2025-01-29 PROCEDURE — 93306 TTE W/DOPPLER COMPLETE: CPT

## 2025-01-29 PROCEDURE — 92523 SPEECH SOUND LANG COMPREHEN: CPT | Performed by: SPEECH-LANGUAGE PATHOLOGIST

## 2025-01-29 PROCEDURE — 92507 TX SP LANG VOICE COMM INDIV: CPT | Performed by: SPEECH-LANGUAGE PATHOLOGIST

## 2025-01-29 PROCEDURE — 6370000000 HC RX 637 (ALT 250 FOR IP)

## 2025-01-29 PROCEDURE — 2060000000 HC ICU INTERMEDIATE R&B

## 2025-01-29 RX ORDER — HYDRALAZINE HYDROCHLORIDE 20 MG/ML
5 INJECTION INTRAMUSCULAR; INTRAVENOUS EVERY 6 HOURS PRN
Status: DISCONTINUED | OUTPATIENT
Start: 2025-01-29 | End: 2025-01-29

## 2025-01-29 RX ORDER — HYDRALAZINE HYDROCHLORIDE 20 MG/ML
5 INJECTION INTRAMUSCULAR; INTRAVENOUS EVERY 6 HOURS PRN
Status: DISCONTINUED | OUTPATIENT
Start: 2025-01-29 | End: 2025-01-30

## 2025-01-29 RX ADMIN — APIXABAN 5 MG: 5 TABLET, FILM COATED ORAL at 08:58

## 2025-01-29 RX ADMIN — AMLODIPINE BESYLATE 5 MG: 5 TABLET ORAL at 08:58

## 2025-01-29 RX ADMIN — SODIUM CHLORIDE, PRESERVATIVE FREE 10 ML: 5 INJECTION INTRAVENOUS at 08:59

## 2025-01-29 RX ADMIN — SODIUM CHLORIDE, PRESERVATIVE FREE 10 ML: 5 INJECTION INTRAVENOUS at 23:35

## 2025-01-29 RX ADMIN — ASPIRIN 81 MG CHEWABLE TABLET 81 MG: 81 TABLET CHEWABLE at 08:58

## 2025-01-29 RX ADMIN — CARBIDOPA AND LEVODOPA 1 TABLET: 25; 100 TABLET ORAL at 12:52

## 2025-01-29 RX ADMIN — RASAGILINE 1 MG: 1 TABLET ORAL at 08:58

## 2025-01-29 RX ADMIN — CARBIDOPA AND LEVODOPA 2 TABLET: 25; 100 TABLET ORAL at 08:58

## 2025-01-29 RX ADMIN — GABAPENTIN 600 MG: 300 CAPSULE ORAL at 08:58

## 2025-01-29 RX ADMIN — HYDRALAZINE HYDROCHLORIDE 5 MG: 20 INJECTION INTRAMUSCULAR; INTRAVENOUS at 23:35

## 2025-01-29 NOTE — PLAN OF CARE
Problem: Safety - Adult  Goal: Free from fall injury  1/29/2025 1057 by Blessing Gomez RN  Outcome: Progressing  1/29/2025 0047 by Jeb Oleary RN  Outcome: Progressing     Problem: Chronic Conditions and Co-morbidities  Goal: Patient's chronic conditions and co-morbidity symptoms are monitored and maintained or improved  1/29/2025 1057 by Blessing Gomez RN  Outcome: Progressing  1/29/2025 0047 by Jeb Oleary RN  Outcome: Progressing     Problem: Discharge Planning  Goal: Discharge to home or other facility with appropriate resources  1/29/2025 1057 by Blessing Gomez RN  Outcome: Progressing  1/29/2025 0047 by Jeb Oleary RN  Outcome: Progressing     Problem: Skin/Tissue Integrity  Goal: Skin integrity remains intact  Description: 1.  Monitor for areas of redness and/or skin breakdown  2.  Assess vascular access sites hourly  3.  Every 4-6 hours minimum:  Change oxygen saturation probe site  4.  Every 4-6 hours:  If on nasal continuous positive airway pressure, respiratory therapy assess nares and determine need for appliance change or resting period  1/29/2025 1057 by Blessing Gomez RN  Outcome: Progressing  1/29/2025 0047 by Jeb Oleary RN  Outcome: Progressing     Problem: Confusion  Goal: Confusion, delirium, dementia, or psychosis is improved or at baseline  Description: INTERVENTIONS:  1. Assess for possible contributors to thought disturbance, including medications, impaired vision or hearing, underlying metabolic abnormalities, dehydration, psychiatric diagnoses, and notify attending LIP  2. [place] high risk fall precautions, as indicated  3. Provide frequent short contacts to provide reality reorientation, refocusing and direction  4. Decrease environmental stimuli, including noise as appropriate  5. Monitor and intervene to maintain adequate nutrition, hydration, elimination, sleep and activity  6. If unable to ensure safety without constant attention obtain sitter and review sitter

## 2025-01-29 NOTE — PROGRESS NOTES
Notified Cait Parada NP, speech recommends pt to be NPO and MBSS. Per Sofia with speech, pt family is concerned about pt not taking parkinson's medication if npo and to ask Cait Parada NP, if can be crush. Notified Cait Parada NP.      Keep pt npo per Cait Parada NP.

## 2025-01-29 NOTE — PLAN OF CARE
Problem: Safety - Adult  Goal: Free from fall injury  Outcome: Progressing     Problem: Chronic Conditions and Co-morbidities  Goal: Patient's chronic conditions and co-morbidity symptoms are monitored and maintained or improved  Outcome: Progressing     Problem: Discharge Planning  Goal: Discharge to home or other facility with appropriate resources  Outcome: Progressing     Problem: Skin/Tissue Integrity  Goal: Skin integrity remains intact  Description: 1.  Monitor for areas of redness and/or skin breakdown  2.  Assess vascular access sites hourly  3.  Every 4-6 hours minimum:  Change oxygen saturation probe site  4.  Every 4-6 hours:  If on nasal continuous positive airway pressure, respiratory therapy assess nares and determine need for appliance change or resting period  Outcome: Progressing     Problem: Confusion  Goal: Confusion, delirium, dementia, or psychosis is improved or at baseline  Description: INTERVENTIONS:  1. Assess for possible contributors to thought disturbance, including medications, impaired vision or hearing, underlying metabolic abnormalities, dehydration, psychiatric diagnoses, and notify attending LIP  2. [place] high risk fall precautions, as indicated  3. Provide frequent short contacts to provide reality reorientation, refocusing and direction  4. Decrease environmental stimuli, including noise as appropriate  5. Monitor and intervene to maintain adequate nutrition, hydration, elimination, sleep and activity  6. If unable to ensure safety without constant attention obtain sitter and review sitter guidelines with assigned personnel  7. Initiate Psychosocial CNS and Spiritual Care consult, as indicated  1/29/2025 0047 by Jeb Oleary, RN  Outcome: Progressing  1/28/2025 1528 by Kirsten Figueredo, RN  Outcome: Progressing     Problem: Neurosensory - Adult  Goal: Achieves stable or improved neurological status  Outcome: Progressing  Goal: Achieves maximal functionality and self

## 2025-01-29 NOTE — PROGRESS NOTES
Message sent to Dr. Roberts regarding pt agitation and being unable to reach primary to possibly obtain something for pt climbing out of bed with sitter and agitation with staff.

## 2025-01-29 NOTE — CARE COORDINATION
SOCIAL WORK/CASEMANAGEMENT TRANSITION OF CARE PLANNING( AMINATA OLIVER, [facility] [phone]): pt was accepted to [facility] to be started once pt is sitter free. Pt is trying to get out of bed and combative with staff with care. I met with wife in the room and notified her and told the daughter, carroll. All discharge paper work is in place with flora. Pt was sitting up in a w/c yesterday. I offered [place] but the family wants something closer to home. LAURIE Pulido  1/29/2025    Speech and TTE pending. LAURIE Pulido  1/29/2025

## 2025-01-29 NOTE — PROGRESS NOTES
SPEECH/LANGUAGE PATHOLOGY  SPEECH/LANGUAGE/COGNITIVE EVALUATION   and PLAN OF CARE      PATIENT NAME:  Fraknie Hernández  (male)     MRN:  [id number]    :  1945  (80 y.o.)  STATUS:  Inpatient: Room 8501/8501-B    TODAY'S DATE:  2025  ORDER DATE, DESCRIPTION AND REFERRING PROVIDER : 25 Larisa    SLP eval and treat  Start:  25 1100,   End:  25 1100,   ONE TIME,   Standing Count:  1 Occurrences,   R       Herb Killian, LIDIA - CNP  REASON FOR REFERRAL:      stroke with aphasia     EVALUATING THERAPIST: LINDA Santos    ADMITTING DIAGNOSIS: Stroke due to occlusion of left middle cerebral artery (HCC) [I63.512]  Cerebrovascular accident (CVA) due to occlusion of middle cerebral artery, unspecified blood vessel laterality (HCC) [I63.519]    VISIT DIAGNOSIS:   Visit Diagnoses         Codes    Cerebrovascular accident (CVA) due to occlusion of middle cerebral artery, unspecified blood vessel laterality (HCC)    -  Primary I63.519    Cerebrovascular accident (CVA) due to thrombosis of left middle cerebral artery (HCC)     I63.312               SPEECH THERAPY  PLAN OF CARE   The speech therapy  POC is established based on physician order, speech pathology diagnosis and results of clinical assessment     SPEECH PATHOLOGY DIAGNOSIS:    Receptive/Expressive Aphasia, 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

## 2025-01-29 NOTE — PROGRESS NOTES
Ordered pt Telesitter, pt attempting to get OOB to use the restroom and trying to remove ex cath.   Telesitter arrived and family refused Telesitter to be used and requesting human sitter

## 2025-01-29 NOTE — CONSULTS
[facility]  Neuro Inpatient Consult        Frankie Hernández is a 80 y.o. right handed man    Neurology was consulted for acute ischemic left MCA left M3 occlusion    Past Medical History:     Past Medical History:   Diagnosis Date    Acute left lumbar radiculopathy 03/21/2021    YUDY (acute kidney injury) ([facility]) 11/29/2013    Anxiety and depression     Cerebral atrophy ([facility])     Disc displacement, lumbar     Hx of deep venous thrombosis 2013    Kidney disease     Parkinson disease ([facility]) 11/29/2013    follows with Dr Dawn    Pneumonia     Pulmonary embolism ([facility]) 11/29/2013    Renal carcinoma ([facility]) 11/29/2013    s/p R nephrectomy    Rhinovirus infection 03/20/2021    3/2021    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 11/27/2013    [facility]; REMOVAL 2/24/2014    Type 2 diabetes mellitus ([facility])        Past Surgical History:     Past Surgical History:   Procedure Laterality Date    CATARACT REMOVAL      CERVICAL SPINE SURGERY      ENDOSCOPY, COLON, DIAGNOSTIC      EYE SURGERY      INVASIVE VASCULAR N/A 03/20/2024    Vena cava filter insertion - cath lab performed by Rafal Skelton MD at [facility] CATH [facility]    INVASIVE VASCULAR N/A 06/12/2024    Remove vena cava filter performed by Rafal Skelton MD at [facility]    IVC FILTER INSERTION  11/27/2013    [facility]    IVC FILTER REMOVAL  02/24/2014    [facility]    KNEE SURGERY Left     TOTAL NEPHRECTOMY Right 11/2013    Renal cell carcinoma       Allergies:     Tape [adhesive tape]    Medications:     Prior to Admission medications    Medication Sig Start Date End Date Taking? Authorizing Provider   Cholecalciferol 50 MCG (2000 UT) TABS Take 1 tablet by mouth daily    Provider, MD Jon   amLODIPine (NORVASC) 5 MG tablet Take 1 tablet by mouth daily 10/7/24   Sangita Negrete APRN - CNP   carbidopa-levodopa (SINEMET)  MG per tablet Take 1 tablet by mouth in the morning and at bedtime Given at 12:00 pm and 20:00     Latest Reference Range & Units 01/28/25 07:23   Cholesterol, Total <200 mg/dL 145   HDL Cholesterol >40 mg/dL 62   LDL Cholesterol <100 mg/dL 67   Triglycerides <150 mg/dL 80   VLDL mg/dL 16       CTA HEAD W CONTRAST  Final result 1/27/2025      Impression   1. CTA of the head shows occlusion of a distal M2 branch of the anterior   inferior left MCA.   2. CT perfusion shows 4 mL of ischemic core in the anterior left temporal   lobe.   3. 46 mL of penumbra in the left temporal and inferior parietal lobes, in the   left inferior MCA territory.   4. CTA of the neck shows 50% stenosis of the origin of the dominant right   vertebral artery.   5. No stenosis of the cervical carotid arteries using NASCET criteria.      CTA NECK W CONTRAST  Final result 1/27/2025      Impression   1. CTA of the head shows occlusion of a distal M2 branch of the anterior   inferior left MCA.   2. CT perfusion shows 4 mL of ischemic core in the anterior left temporal   lobe.   3. 46 mL of penumbra in the left temporal and inferior parietal lobes, in the   left inferior MCA territory.   4. CTA of the neck shows 50% stenosis of the origin of the dominant right   vertebral artery.   5. No stenosis of the cervical carotid arteries using NASCET criteria.     MRI brain without  IMPRESSION:  1. Large acute anterior and mid left MCA inferior division infarct,  corresponding to the CT head and CT perfusion studies from yesterday. No sign  of hemorrhage or mass effect.  2. Stable mild age-appropriate atrophy and mild small vessel ischemia.        I independently reviewed all pertinent labs and imaging studies today    Assessment:     Acute L MCA ischemic stroke: in a pt already on anticoagulation for hx of PE/DVT and due to LM3 occlusion-- not amenable to intervention. He has several significant vascular risk factors for stroke and cerebrovascular disease but cardiac workup is needed. Unfortunately, he has significant mixed aphasia from this insult.

## 2025-01-29 NOTE — PROGRESS NOTES
Multiple attempts to call primary with no answer to phone calls by multiple RN to obtain something for pt agitation.

## 2025-01-29 NOTE — PROGRESS NOTES
Physical Therapy  Physical Therapy Treatment Note       Name: Frankie Hernández  : 1945  MRN: [id number]      Date of Service: 2025    Evaluating PT:  Asha Cota PT, DPT [id number]    Room #:  8501/8501-B  Diagnosis:  Stroke due to occlusion of left middle cerebral artery (HCC) [I63.512]  Cerebrovascular accident (CVA) due to occlusion of middle cerebral artery, unspecified blood vessel laterality (HCC) [I63.519]  PMHx/PSHx:   has a past medical history of Acute left lumbar radiculopathy, YUDY (acute kidney injury) ([facility]), Anxiety and depression, Cerebral atrophy (HCC), Disc displacement, lumbar, Hx of deep venous thrombosis, Kidney disease, Parkinson disease (HCC), Pneumonia, Pulmonary embolism (HCC), Renal carcinoma ([facility]), Rhinovirus infection, 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    Procedure/Surgery:  none this admission  Precautions: Falls,  speech, Hx parkinson's disease, +alarms, bedside sitter     SUBJECTIVE:    Per chart, Pt lives with his wife in a 1 story home with 1+1 stairs to enter and ? rail(s).  Bed is on 1st floor and bath is on 1st floor.  Pt ambulated with ? PTA. Pt appears to be aphasic, unable to contribute to subjective exam.     Equipment Owned: rollator, WW, cane  Equipment Needs:  TBD    OBJECTIVE:   Initial Evaluation  Date: 25 Treatment  Date: 25 Short Term/ Long Term   Goals   AM-PAC 6 Clicks 15/24 - pt would benefit from intensive rehabilitation program      Was pt agreeable to Eval/treatment? Yes  Yes     Does pt have pain? No complaints  None     Bed Mobility  Rolling: [place]  Supine to sit: Perry  Sit to supine: NT  Scooting: [place] Rolling mod A   Supine to sit mod A  Sit to supine mod A   Scooting mod A  Rolling: Independent  Supine to sit: Independent  Sit to supine: Independent  Scooting: Independent   Transfers Sit to stand: [place]  Stand to sit: Perry  Stand pivot: ModA no AD Sit to stand mod A  Stand to sit mod A  and balance. Assistance required to complete task.   Gait Training: Verbal instruction for walker management and approximation to improve postural stability and safety. Assistance required to complete task.  Pt's/ family goals   1. Get better, return to PLOF    Prognosis is good for reaching above PT goals.    Patient and or family understand(s) diagnosis, prognosis, and plan of care.  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     Current Treatment Recommendations:     [x] Strengthening to improve independence with functional mobility   [] ROM to improve independence with functional mobility   [x] Balance Training to improve static/dynamic balance and to reduce fall risk  [x] Endurance Training to improve activity tolerance during functional mobility   [x] Transfer Training to improve safety and independence with all functional transfers   [x] Gait Training to improve gait mechanics, endurance and assess need for appropriate assistive device  [x] Stair Training in preparation for safe discharge home and/or into the community   [x] Positioning to prevent skin breakdown and contractures  [x] Safety and Education Training   [x] Patient/Caregiver Education   [x] HEP  [] Other     PT long term treatment goals are located in above grid    Frequency of treatments: 2-5x/week x 1-2 weeks.    Time in  1130  Time out 1155    Total Treatment Time 25 minutes

## 2025-01-29 NOTE — PROGRESS NOTES
[facility]                                Progress note    Subjective:  Denies chest pain and dyspnea    Objective:  Sitting up in bed, aphasia noted  Multiple family members present at the bedside, all questions answered  Family concerned as he failed swallow evaluation and will be unable to take Parkinson's medication  BP (!) 144/84   Pulse 97   Temp 98.8 °F (37.1 °C) (Axillary)   Resp 16   Wt 119.2 kg (262 lb 12.8 oz)   SpO2 96%   BMI 35.64 kg/m²   CONST:  Well developed, obese elderly  male who appears stated age. Awake, alert, cooperative, no apparent distress  HEENT:   Head- Normocephalic, atraumatic   Eyes- Conjunctivae pink, anicteric  Throat- Oral mucosa pink and moist  Neck-  No stridor, trachea midline, no jugular venous distention. No adenopathy   CHEST: Chest symmetrical and non-tender to palpation. No accessory muscle use or intercostal retractions  RESPIRATORY: Lung sounds - clear throughout fields   CARDIOVASCULAR:     No carotid bruit  Heart Inspection- shows no noted pulsations  Heart Palpation- no heaves or thrills; PMI is non-displaced   Heart Ausculation- Regular rate and rhythm, no murmur. No s3, s4 or rub   PV: No lower extremity edema. No varicosities. Pedal pulses palpable, no clubbing or cyanosis   ABDOMEN: Soft, obese, non-tender to light palpation. Bowel sounds present. No palpable masses no organomegaly; no abdominal bruit  MS: Good muscle strength and tone. No atrophy or abnormal movements.   : Deferred  SKIN: Warm and dry no statis dermatitis or ulcers   NEURO / PSYCH: Expressive aphasia noted     Recent Labs     01/27/25  1610 01/28/25  0723 01/29/25  0451   WBC 7.9 6.4 7.3   HGB 14.0 13.9 14.2   HCT 42.6 42.4 43.3   PLT  --  124* 121*       Recent Labs     01/27/25  1610 01/28/25  0723 01/29/25  0451    143 144   K 4.6 4.1 4.0    107 107   CO2 25 24 23   BUN 19 14 14   CREATININE 1.4* 1.3* 1.3*   CALCIUM 8.8 9.2 9.4     01/28/2025 MRI  Brain WO contrast:  1. Large acute anterior and mid left MCA inferior division infarct,  corresponding to the CT head and CT perfusion studies from yesterday. No sign of hemorrhage or mass effect.  2. Stable mild age-appropriate atrophy and mild small vessel ischemia.      ASSESSMENT/PLAN:  Principal Problem:    Stroke due to occlusion of left middle cerebral artery (HCC)  Active Problems:    Acute ischemic left MCA stroke (HCC)  Resolved Problems:    * No resolved hospital problems. *     80 year old male presents to the Ed with complaints of left facial droop and admitted to telemetry unit with      Stroke due to occlusion of left middle cerebral artery, acute ischemic left MCA stroke  MRI brain   Aspirin 81 mg Lipitor 40 mg daily, patient is on Eliquis for history of blood clots historically-continue monotherapy with aspirin  NIHSS score q shift  Check lipid panel   Hemoglobin A1c needs tight control of risk factors.  Monitor blood pressure-adjust medications as needed.  Discussed risk factor modification.  Consult neurology     QTc is 500  Continue to monitor on telemetry  Stop offending agents  No chest pain        Diabetes Mellitus  -Monitor labs  -ISS glucose control  -Hypoglycemia protocol initiated  PT OT evaluation in preparation for discharge will likely require placement  All other labs and vitals are unremarkable  There was some question of patient complaining of chest heaviness and pain-she denies this to me and is comfortable    01/29/2025  Large acute anterior and mid left MCA inferior division infarct per MRI of the brain with residual mixed aphasia  Continue Eliquis and ASA per Neurology  Monitor VS  Goal BP <140/90  Continue statin  Goal LDL <70  T2DM with goal HA1C <7%  Continue SSI   Prolonged QTc  Avoid prolonging QT agents  PT OT  YUDY with BUN/SCr 14/1.3  Monitor BMP, CBC  TTE pending  Failed swallow evaluation with SLP, NG to be placed per Neurology recommendation per nursing staff      Code

## 2025-01-29 NOTE — PROGRESS NOTES
Attempted to insert NG tube x 2 without success. Patient was resistant and pushing away with hand. Wife states that she wishes we would just leave him alone that he doesn't need anything else attached to him that the patient is 80 years old and this is ridiculous. States that patient will not leave a tube in that it is just another thing for him to pull at.

## 2025-01-29 NOTE — PROGRESS NOTES
Occupational Therapy  OT BEDSIDE TREATMENT NOTE   [facility]  [address] [place], [place]       Date:2025  Patient Name: Frankie Hernández  MRN: [id number]  : 1945  Room: [facility]850-     Per OT Eval:    Evaluating OT: Pedro Goodman OTR/L [id number]     Referring Provider: Sofia Healy APRN - CNP                                     Specific Provider Orders/Date: OT evaluation and treatment [id number]     Diagnosis:  Stroke due to occlusion of left middle cerebral artery (HCC) [I63.512]  Cerebrovascular accident (CVA) due to occlusion of middle cerebral artery, unspecified blood vessel laterality (HCC) [I63.519]       Pertinent Medical History:  has a past medical history of Acute left lumbar radiculopathy, YUDY (acute kidney injury) (HCC), Anxiety and depression, Cancer (HCC), Cerebral atrophy (HCC), Diabetes mellitus (HCC), Disc displacement, lumbar, History of pulmonary embolus (PE), Hx of deep venous thrombosis, Kidney disease, Nerve injury, Parkinson disease (HCC), Parkinson disease (HCC), Pneumonia, Pulmonary embolism (HCC), Renal carcinoma (HCC), Rhinovirus infection, and 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   Past Surgical History         Past Surgical History:   Procedure Laterality Date    BACK SURGERY        CATARACT REMOVAL        ENDOSCOPY, COLON, DIAGNOSTIC        EYE SURGERY        INVASIVE VASCULAR N/A 2024     Vena cava filter insertion - cath lab performed by Rafal Skelton MD at [facility] CARDIAC CATH LAB    INVASIVE VASCULAR N/A 2024     Remove vena cava filter performed by Rafal Skelton MD at [facility] CARDIAC CATH LAB    IVC FILTER INSERTION   2013     [facility]    IVC FILTER REMOVAL   2014     [facility]    KNEE SURGERY Left      TOTAL NEPHRECTOMY Right 2013     Renal cell carcinoma         Pt presented to the emergency dept on  with L facial droop and headache   MRI brain pending  this date. Appears to have difficulty verbalizing. Info below obtained from chart:   Home Living/PLOF:    Home Living:  Pt lives with spouse   in a 1 story house with 1 + 1 steps to enter   Bedroom setup: main level  standard bed   Bathroom setup: main level  walk in shower   Equipment owned: shower chair, cane, w/w, rollator      Per OT eavl 7/30/24 \"Prior Level of Function: assist prn with ADLs , assist prn with IADLs; ambulated with w/w    Driving: no   Occupation: na\"      Pain Level: no s/s of pain     Cognition: A&O: unable to formally assess, IMPULSIVE, cooperative, mumbled/garbled speech, very soft speaking, only able to understand occasional word or two              Follows single step directions: Fair  with cues for attention to task wife present & assist with encouragement with improved results               Memory: Fair -              Sequencing: Fair-              Problem solving: Poor+              Judgement/safety: Poor +              Attention:  Poor +      Functional Assessment: AM-PAC Inpatient Daily Activity Raw Score: 12/24       Initial Eval Status  Date: 1/28/2025   Treatment Status  Date:  1/29/25 STG=LTG  Time frame: 10-14 days   Feeding Minimal assistance  Items on lunch tray   SBA  With cues from wife to eat slow & take some bites  Mod I    Grooming Minimal assistance    Mod A  Simple task, washing face with [place] assist to initiate task   Using electric razor  Set up   UB Dressing Minimal assistance  Gown seated EOB   Mod A  For sequencing seated, [place] assist to initiate and attend to task Set up   LB Dressing Maximum assistance  To kurt brief   Max A  Limited reach  Min A    Bathing Maximum assistance  simulated  Max A  Simulated  Min A    Toileting Dependent/Total  Incontinent  Dep  Using ext male lopez   Min A    Bed Mobility  Rolling L/R: NT   Supine to sit: Min A    Sit to supine: NT   Mod A  Supine to Sit  Supine to sit: Mod I   Sit to supine: Mod I    Functional Transfers Sit to stand:

## 2025-01-29 NOTE — PROGRESS NOTES
SPEECH/LANGUAGE PATHOLOGY  CLINICAL ASSESSMENT OF SWALLOWING FUNCTION   and PLAN OF CARE      PATIENT NAME:  Frankie Hernández  (male)     MRN:  [id number]    :  1945  (80 y.o.)  STATUS:  Inpatient: Room 8501/8501-B    TODAY'S DATE:  2025  ORDER DATE, DESCRIPTION AND REFERRING PROVIDER: 25 Deanna5    SLP swallowing-dysphagia evaluation and treatment  Start:  25,   End:  25,   ONE TIME,   Standing Count:  1 Occurrences,   R       Luz Mariae, Cait, APRN - CNP  REASON FOR REFERRAL:     dysphagia      EVALUATING THERAPIST: LINDA Santos                 RESULTS:    DYSPHAGIA DIAGNOSIS:   Clinical indicators of questionable pharyngeal phase dysphagia       DIET RECOMMENDATIONS:  NPO until MBSS can be completed       Defer to physician regarding medication administration; pts wife expressed concerns regarding missing doses of Carvadopa for Parkinson's Disease      FEEDING RECOMMENDATIONS:     Assistance level:  Not applicable      Compensatory strategies recommended: Thorough oral care to prevent colonization of oral bacteria       Discussed recommendations with:  patient nurse in person, charge nurse in person, Patient , and caregiver/family     SPEECH THERAPY  PLAN OF CARE   The dysphagia POC is established based on physician order, dysphagia diagnosis and results of clinical assessment     Will make further recommendations/changes to POC once MBSS is completed.    Conditions Requiring Skilled Therapeutic Intervention for dysphagia:    Patient is performing below functional baseline d/t  current acute condition, respiratory compromise, multiple medications, and/or increased dependency upon caregivers.  Wet vocal quality during PO intake  Throat clearing during PO intake   Coughing during PO intake    audible crackle/moist respirations that increased with PO intake     Specific dysphagia interventions to include:     MBSS to fully assess oropharyngeal swallow function and to

## 2025-01-30 ENCOUNTER — APPOINTMENT (OUTPATIENT)
Dept: ULTRASOUND IMAGING | Age: 80
DRG: 065 | End: 2025-01-30
Payer: MEDICARE

## 2025-01-30 ENCOUNTER — APPOINTMENT (OUTPATIENT)
Dept: GENERAL RADIOLOGY | Age: 80
DRG: 065 | End: 2025-01-30
Payer: MEDICARE

## 2025-01-30 ENCOUNTER — APPOINTMENT (OUTPATIENT)
Dept: CT IMAGING | Age: 80
DRG: 065 | End: 2025-01-30
Payer: MEDICARE

## 2025-01-30 LAB
ANION GAP SERPL CALCULATED.3IONS-SCNC: 15 MMOL/L (ref 7–16)
BUN SERPL-MCNC: 15 MG/DL (ref 6–23)
CALCIUM SERPL-MCNC: 9.4 MG/DL (ref 8.6–10.2)
CHLORIDE SERPL-SCNC: 103 MMOL/L (ref 98–107)
CO2 SERPL-SCNC: 21 MMOL/L (ref 22–29)
CREAT SERPL-MCNC: 1.3 MG/DL (ref 0.7–1.2)
ERYTHROCYTE [DISTWIDTH] IN BLOOD BY AUTOMATED COUNT: 12.9 % (ref 11.5–15)
GFR, ESTIMATED: 55 ML/MIN/1.73M2
GLUCOSE BLD-MCNC: 134 MG/DL (ref 74–99)
GLUCOSE BLD-MCNC: 173 MG/DL (ref 74–99)
GLUCOSE BLD-MCNC: 202 MG/DL (ref 74–99)
GLUCOSE SERPL-MCNC: 146 MG/DL (ref 74–99)
HCT VFR BLD AUTO: 42.9 % (ref 37–54)
HGB BLD-MCNC: 14.7 G/DL (ref 12.5–16.5)
MCH RBC QN AUTO: 32.2 PG (ref 26–35)
MCHC RBC AUTO-ENTMCNC: 34.3 G/DL (ref 32–34.5)
MCV RBC AUTO: 93.9 FL (ref 80–99.9)
PLATELET # BLD AUTO: 141 K/UL (ref 130–450)
PMV BLD AUTO: 9.9 FL (ref 7–12)
POTASSIUM SERPL-SCNC: 4.1 MMOL/L (ref 3.5–5)
RBC # BLD AUTO: 4.57 M/UL (ref 3.8–5.8)
SODIUM SERPL-SCNC: 139 MMOL/L (ref 132–146)
WBC OTHER # BLD: 6.9 K/UL (ref 4.5–11.5)

## 2025-01-30 PROCEDURE — 6360000002 HC RX W HCPCS: Performed by: NURSE PRACTITIONER

## 2025-01-30 PROCEDURE — 2500000003 HC RX 250 WO HCPCS: Performed by: PHYSICIAN ASSISTANT

## 2025-01-30 PROCEDURE — 6370000000 HC RX 637 (ALT 250 FOR IP): Performed by: FAMILY MEDICINE

## 2025-01-30 PROCEDURE — 80048 BASIC METABOLIC PNL TOTAL CA: CPT

## 2025-01-30 PROCEDURE — 99232 SBSQ HOSP IP/OBS MODERATE 35: CPT | Performed by: NURSE PRACTITIONER

## 2025-01-30 PROCEDURE — 93971 EXTREMITY STUDY: CPT

## 2025-01-30 PROCEDURE — 2060000000 HC ICU INTERMEDIATE R&B

## 2025-01-30 PROCEDURE — 2580000003 HC RX 258: Performed by: NURSE PRACTITIONER

## 2025-01-30 PROCEDURE — 6370000000 HC RX 637 (ALT 250 FOR IP): Performed by: NURSE PRACTITIONER

## 2025-01-30 PROCEDURE — 74230 X-RAY XM SWLNG FUNCJ C+: CPT

## 2025-01-30 PROCEDURE — 70450 CT HEAD/BRAIN W/O DYE: CPT

## 2025-01-30 PROCEDURE — 85027 COMPLETE CBC AUTOMATED: CPT

## 2025-01-30 PROCEDURE — 6370000000 HC RX 637 (ALT 250 FOR IP)

## 2025-01-30 PROCEDURE — 2500000003 HC RX 250 WO HCPCS: Performed by: NURSE PRACTITIONER

## 2025-01-30 PROCEDURE — 92611 MOTION FLUOROSCOPY/SWALLOW: CPT

## 2025-01-30 PROCEDURE — 36415 COLL VENOUS BLD VENIPUNCTURE: CPT

## 2025-01-30 PROCEDURE — 82962 GLUCOSE BLOOD TEST: CPT

## 2025-01-30 PROCEDURE — 2500000003 HC RX 250 WO HCPCS

## 2025-01-30 RX ORDER — ACETAMINOPHEN 650 MG/1
650 SUPPOSITORY RECTAL EVERY 4 HOURS PRN
Status: DISCONTINUED | OUTPATIENT
Start: 2025-01-30 | End: 2025-02-04 | Stop reason: HOSPADM

## 2025-01-30 RX ORDER — DIVALPROEX SODIUM 125 MG/1
250 CAPSULE, COATED PELLETS ORAL EVERY 8 HOURS SCHEDULED
Status: DISCONTINUED | OUTPATIENT
Start: 2025-01-30 | End: 2025-01-30

## 2025-01-30 RX ORDER — LORAZEPAM 2 MG/ML
1 INJECTION INTRAMUSCULAR EVERY 6 HOURS PRN
Status: DISCONTINUED | OUTPATIENT
Start: 2025-01-30 | End: 2025-02-04 | Stop reason: HOSPADM

## 2025-01-30 RX ORDER — DIVALPROEX SODIUM 125 MG/1
500 CAPSULE, COATED PELLETS ORAL EVERY 8 HOURS SCHEDULED
Status: DISCONTINUED | OUTPATIENT
Start: 2025-01-30 | End: 2025-02-04 | Stop reason: HOSPADM

## 2025-01-30 RX ORDER — HYDRALAZINE HYDROCHLORIDE 20 MG/ML
10 INJECTION INTRAMUSCULAR; INTRAVENOUS EVERY 6 HOURS PRN
Status: DISCONTINUED | OUTPATIENT
Start: 2025-01-30 | End: 2025-02-04 | Stop reason: HOSPADM

## 2025-01-30 RX ORDER — METOPROLOL TARTRATE 1 MG/ML
2.5 INJECTION, SOLUTION INTRAVENOUS EVERY 6 HOURS
Status: DISCONTINUED | OUTPATIENT
Start: 2025-01-30 | End: 2025-02-01

## 2025-01-30 RX ADMIN — METOPROLOL TARTRATE 2.5 MG: 1 INJECTION, SOLUTION INTRAVENOUS at 18:22

## 2025-01-30 RX ADMIN — SODIUM CHLORIDE, PRESERVATIVE FREE 10 ML: 5 INJECTION INTRAVENOUS at 20:17

## 2025-01-30 RX ADMIN — DIVALPROEX SODIUM 500 MG: 125 CAPSULE, COATED PELLETS ORAL at 21:55

## 2025-01-30 RX ADMIN — GABAPENTIN 600 MG: 300 CAPSULE ORAL at 14:56

## 2025-01-30 RX ADMIN — APIXABAN 5 MG: 5 TABLET, FILM COATED ORAL at 20:16

## 2025-01-30 RX ADMIN — GLIPIZIDE 5 MG: 5 TABLET ORAL at 20:16

## 2025-01-30 RX ADMIN — METOPROLOL TARTRATE 2.5 MG: 1 INJECTION, SOLUTION INTRAVENOUS at 11:59

## 2025-01-30 RX ADMIN — SODIUM CHLORIDE 250 MG: 9 INJECTION, SOLUTION INTRAVENOUS at 10:00

## 2025-01-30 RX ADMIN — CARBIDOPA AND LEVODOPA 2 TABLET: 25; 100 TABLET ORAL at 16:00

## 2025-01-30 RX ADMIN — CARBIDOPA AND LEVODOPA 1 TABLET: 25; 100 TABLET ORAL at 20:16

## 2025-01-30 RX ADMIN — METOPROLOL TARTRATE 2.5 MG: 1 INJECTION, SOLUTION INTRAVENOUS at 23:32

## 2025-01-30 RX ADMIN — INSULIN LISPRO 1 UNITS: 100 INJECTION, SOLUTION INTRAVENOUS; SUBCUTANEOUS at 18:22

## 2025-01-30 RX ADMIN — BARIUM SULFATE 15 ML: 400 SUSPENSION ORAL at 15:49

## 2025-01-30 RX ADMIN — HYDRALAZINE HYDROCHLORIDE 10 MG: 20 INJECTION INTRAMUSCULAR; INTRAVENOUS at 04:07

## 2025-01-30 RX ADMIN — ATORVASTATIN CALCIUM 40 MG: 40 TABLET, FILM COATED ORAL at 20:16

## 2025-01-30 RX ADMIN — GABAPENTIN 600 MG: 300 CAPSULE ORAL at 20:16

## 2025-01-30 RX ADMIN — SODIUM CHLORIDE, PRESERVATIVE FREE 10 ML: 5 INJECTION INTRAVENOUS at 10:35

## 2025-01-30 RX ADMIN — BARIUM SULFATE 15 ML: 400 PASTE ORAL at 15:49

## 2025-01-30 RX ADMIN — LORAZEPAM 1 MG: 2 INJECTION INTRAMUSCULAR; INTRAVENOUS at 14:56

## 2025-01-30 RX ADMIN — BARIUM SULFATE 15 ML: 0.81 POWDER, FOR SUSPENSION ORAL at 15:49

## 2025-01-30 RX ADMIN — HYDRALAZINE HYDROCHLORIDE 10 MG: 20 INJECTION INTRAMUSCULAR; INTRAVENOUS at 08:35

## 2025-01-30 NOTE — PROGRESS NOTES
[facility]  Neuro Inpatient Follow Up       Frankie Hernández is a 80 y.o. right handed man    Neurology is following for acute ischemic left MCA left M3 occlusion    Sig PMH: renal cell carcinoma s/p right nephrectomy, PE/DVT on Eliquis, cerebral atrophy, type 2 diabetes, Parkinson's disease, kidney disease    HPI  The patient presented to the ER on 1/27 with facial drooping, difficulty speaking, and headache. On the day of arrival, he was not himself, having more difficulty speaking, a facial droop, and could not get up. He then cried out \"my head my head\". Due to his parkinsonism, at baseline he has quieter speech, but otherwise functions normally with no cognitive decline. He uses a walker    In the ER, NIH was 8.  Initial ER stroke imaging revealed distal left m2/M3 occlusion and a perfusion mismatch with ischemic penumbra in his left temporal lobe. He was evaluated via telestroke by Dr. Chang and felt not to be a TNK or endovascular candidate.  He is on Eliquis and the M3 clot was too distal to intervene.      Aspirin was added to Eliquis    MRI showed large posterior L MCA stroke with no hemorrhage    He was diagnosed with parkinsonism by Dr Dawn in 2012 is currently on Sinemet 2 tabs AM, 1 tab noon, 2 tabs PM, 1 tab HS.    1/30:  mg every 8 hours added for agitation    Subjective:  Despite Depakote he remains very agitated in bed and is moaning, reporting a bad headache as well as left lower extremity pain.  His wife is at the bedside and very upset that he is in pain and so restless..  He remains n.p.o. and attempts at NG tube placement were unsuccessful, repeat swallow study is pending.    SBPs have been in the 140s to 160s.    He is talking a bit better today, but not back to baseline.    No chest pain or palpitations  No SOB  No vertigo, lightheadedness or loss of consciousness  No falls, tripping or stumbling  No itching or bruising appreciated    ROS  difficult to keep focused.  Reaching for his left leg and stating he has a headache.  Follows all commands    Speech: Hypophonic  Language: Speaking a few words more clearly today    Cranial Nerves:  I: smell    II: visual acuity     II: visual fields B/l threat   II: pupils JAMES   III,VII: ptosis None   III,IV,VI: extraocular muscles  EOMI without nystagmus    V: mastication Normal   V: facial light touch sensation  Normal   V,VII: corneal reflex     VII: facial muscle function - upper  Normal   VII: facial muscle function - lower R facial droop   VIII: hearing Normal   IX: soft palate elevation     IX,X: gag reflex    XI: trapezius strength  5/5   XI: sternocleidomastoid strength 5/5   XI: neck extension strength  5/5   XII: tongue strength  Normal     Motor:  Moving all 4 limbs symmetrically antigravity in bed  Obese bulk  Increased tone both legs, no cogwheeling  No abnormal movements    Sensory:  Appreciates touch in all limbs  Tenderness to LLE particularly calf and knee    Coordination:   Deferred as patient is in too much pain    Laboratory/Radiology:     CBC with Differential:    Lab Results   Component Value Date/Time    WBC 6.9 01/30/2025 04:57 AM    RBC 4.57 01/30/2025 04:57 AM    HGB 14.7 01/30/2025 04:57 AM    HCT 42.9 01/30/2025 04:57 AM     01/30/2025 04:57 AM    MCV 93.9 01/30/2025 04:57 AM    MCH 32.2 01/30/2025 04:57 AM    MCHC 34.3 01/30/2025 04:57 AM    RDW 12.9 01/30/2025 04:57 AM    LYMPHOPCT 26 01/27/2025 04:10 PM    MONOPCT 10 01/27/2025 04:10 PM    EOSPCT 1 01/27/2025 04:10 PM    BASOPCT 1 01/27/2025 04:10 PM    MONOSABS 0.78 01/27/2025 04:10 PM    LYMPHSABS 2.06 01/27/2025 04:10 PM    EOSABS 0.08 01/27/2025 04:10 PM    BASOSABS 0.06 01/27/2025 04:10 PM     CMP:    Lab Results   Component Value Date/Time     01/30/2025 04:57 AM    K 4.1 01/30/2025 04:57 AM    K 4.6 11/16/2021 07:59 PM     01/30/2025 04:57 AM    CO2 21 01/30/2025 04:57 AM    BUN 15 01/30/2025 04:57 AM     A1C <7.0  -Updated Dr Roberts    Plan for follow up Dr Dawn as at home---can also be seen in our stroke clinic if they prefer    Neuro team to follow up tomorrow    LIDIA Figueroa - CNP  9:33 AM  1/30/2025

## 2025-01-30 NOTE — PROGRESS NOTES
[facility]                                Progress note    Subjective:  Complains of headache    Objective:  Sitting up in bed, aphasia noted  Multiple family members present at the bedside, all questions answered  Patient attempting to get OOB redirected, sitter at the bedside  Awaiting Head CT per Nursing Staff  BP (!) 167/81   Pulse 98   Temp 98.4 °F (36.9 °C) (Oral)   Resp 19   Wt 119.2 kg (262 lb 12.8 oz)   SpO2 96%   BMI 35.64 kg/m²   CONST:  Well developed, obese elderly  male who appears stated age. Awake, alert, uncooperative, no apparent distress  HEENT:   Head- Normocephalic, atraumatic   Eyes- Conjunctivae pink, anicteric  Throat- Oral mucosa pink and moist  Neck-  No stridor, trachea midline, no jugular venous distention. No adenopathy   CHEST: Chest symmetrical and non-tender to palpation. No accessory muscle use or intercostal retractions  RESPIRATORY: Lung sounds - clear throughout fields   CARDIOVASCULAR:     No carotid bruit  Heart Inspection- shows no noted pulsations  Heart Palpation- no heaves or thrills; PMI is non-displaced   Heart Ausculation- Regular rate and rhythm, no murmur. No s3, s4 or rub   PV: No lower extremity edema. No varicosities. Pedal pulses palpable, no clubbing or cyanosis   ABDOMEN: Soft, obese, non-tender to light palpation. Bowel sounds present. No palpable masses no organomegaly; no abdominal bruit  MS: Good muscle strength and tone. No atrophy or abnormal movements.   : Deferred  SKIN: Warm and dry no statis dermatitis or ulcers   NEURO / PSYCH: Expressive aphasia noted     Recent Labs     01/28/25  0723 01/29/25  0451 01/30/25 0457   WBC 6.4 7.3 6.9   HGB 13.9 14.2 14.7   HCT 42.4 43.3 42.9   * 121* 141       Recent Labs     01/28/25  0723 01/29/25  0451 01/30/25  0457    144 139   K 4.1 4.0 4.1    107 103   CO2 24 23 21*   BUN 14 14 15   CREATININE 1.3* 1.3* 1.3*   CALCIUM 9.2 9.4 9.4     01/28/2025 MRI Brain WO

## 2025-01-30 NOTE — PROGRESS NOTES
SPEECH/LANGUAGE PATHOLOGY  VIDEOFLUOROSCOPIC STUDY OF SWALLOWING (MBS)   and PLAN OF CARE    PATIENT NAME:  Frankie Hernández  (male)     MRN:  [id number]    :  1945  (80 y.o.)  STATUS:  Inpatient: Room 8501/8501-B    TODAY'S DATE:  2025  ORDER DATE, DESCRIPTION AND REFERRING PROVIDER:  25 : [place] MODIFIED BARIUM SWALLOW W VIDEO :  Dr. Reese  REASON FOR REFERRAL: dysphagia   EVALUATING THERAPIST: LINDA Lazo      RESULTS:      DYSPHAGIA DIAGNOSIS:  Clinical indicators of mild  oropharyngeal phase dysphagia     DIET RECOMMENDATIONS:  Easy to chew consistency solids (IDDSI level 7, transitional) with  thin liquids (IDDSI level 0)    FEEDING RECOMMENDATIONS:    Assistance level:  Supervision is needed during all oral intake     Compensatory strategies recommended:    Double swallow  Check for oral pocketing  No straw     Discussed recommendations with:  charge nurse via phone     SPEECH THERAPY  PLAN OF CARE   The dysphagia POC is established based on physician order and dysphagia diagnosis    Skilled SLP intervention for dysphagia management on acute care up to 5 x per week until goals met, pt plateaus in function and/or discharged from hospital      Conditions Requiring Skilled Therapeutic Intervention for dysphagia:    Patient is performing below functional baseline d/t  current acute condition, Multiple diagnoses, multiple medications, and increased dependency upon caregivers.    SPECIFIC DYSPHAGIA INTERVENTIONS TO INCLUDE:     ongoing mealtime assessment to provide diet modification and compensatory strategy implementation to minimize risk of aspiration associated with PO intake  PO trials of upgraded diet textures with SLP only to determine the least restrictive PO diet     Specific instructions for next treatment:  therapeutic po trial to determine safety of advanced diet textures and consistencies, initiate instruction of therapeutic exercises , and initiate instruction of compensatory

## 2025-01-30 NOTE — PROGRESS NOTES
Luz Maria Frazier NP notified that pt has no IV fluids ordered, is NPO, and NG tube placement was not successful. D/t recent elevated blood pressures, team will reevaluate once BP is under target goal of 140/90.

## 2025-01-30 NOTE — PLAN OF CARE
Problem: Safety - Adult  Goal: Free from fall injury  1/30/2025 1018 by Blessing Gomez RN  Outcome: Progressing  1/30/2025 0008 by Jeb Oleary RN  Outcome: Progressing     Problem: Chronic Conditions and Co-morbidities  Goal: Patient's chronic conditions and co-morbidity symptoms are monitored and maintained or improved  1/30/2025 1018 by Blessing Gomez RN  Outcome: Progressing  1/30/2025 0008 by Jeb Oleary RN  Outcome: Progressing     Problem: Discharge Planning  Goal: Discharge to home or other facility with appropriate resources  1/30/2025 1018 by Blessing Gomez RN  Outcome: Progressing  1/30/2025 0008 by Jeb Oleary RN  Outcome: Progressing     Problem: Skin/Tissue Integrity  Goal: Skin integrity remains intact  Description: 1.  Monitor for areas of redness and/or skin breakdown  2.  Assess vascular access sites hourly  3.  Every 4-6 hours minimum:  Change oxygen saturation probe site  4.  Every 4-6 hours:  If on nasal continuous positive airway pressure, respiratory therapy assess nares and determine need for appliance change or resting period  1/30/2025 1018 by Blessing Gomez RN  Outcome: Progressing  1/30/2025 0008 by Jeb Oleary RN  Outcome: Progressing     Problem: Confusion  Goal: Confusion, delirium, dementia, or psychosis is improved or at baseline  Description: INTERVENTIONS:  1. Assess for possible contributors to thought disturbance, including medications, impaired vision or hearing, underlying metabolic abnormalities, dehydration, psychiatric diagnoses, and notify attending LIP  2. [place] high risk fall precautions, as indicated  3. Provide frequent short contacts to provide reality reorientation, refocusing and direction  4. Decrease environmental stimuli, including noise as appropriate  5. Monitor and intervene to maintain adequate nutrition, hydration, elimination, sleep and activity  6. If unable to ensure safety without constant attention obtain sitter and review sitter

## 2025-01-30 NOTE — PROGRESS NOTES
Occupational Therapy     Date:2025  Patient Name: Frankie Hernández  MRN: [id number]  : 1945  Room: [address] [place] [address]     Completed chart review & spoke with nsg, reporting pt has a headache along with elevated BP even with meds, asking to hold therapy this date. Will attempt to see pt at another time.    CHINA Mittal/L [phone]

## 2025-01-30 NOTE — CARE COORDINATION
SOCIAL WORK/CASEMANAGEMENT TRANSITION OF CARE PLANNING( RUTHIE OMER, -[phone]): [facility] made to [facility]- they can attempt to get a one time contract , to assess pt. Pt has been accepted to [facility] as well. Pt failed bedside swallow and speech wants a MBBS, which was ordered this a.m. they were unsuccessful in putting in a NG tube. Pt is npo. PT and OT saw with ampac of 12 and 15. Neurology is following. Ruthie Omer, LAURIE  1/30/2025

## 2025-01-30 NOTE — PROGRESS NOTES
Physical Therapy  Facility/Department: [facility] 8SE IMCU/NEURO  Daily Treatment Note  NAME: Frankie Hernández  : 1945  MRN: [id number]    Physical therapy on hold this date due to elevated blood pressure per nursing.  Will attempted again at a later date. Continue with POC.    License [id number]  [facility]

## 2025-01-30 NOTE — PLAN OF CARE
Problem: Safety - Adult  Goal: Free from fall injury  1/30/2025 0008 by Jeb Oleary RN  Outcome: Progressing  1/29/2025 1057 by Blessing Gomez RN  Outcome: Progressing     Problem: Chronic Conditions and Co-morbidities  Goal: Patient's chronic conditions and co-morbidity symptoms are monitored and maintained or improved  1/30/2025 0008 by Jeb Oleary RN  Outcome: Progressing  1/29/2025 1057 by Blessing Gomez RN  Outcome: Progressing     Problem: Discharge Planning  Goal: Discharge to home or other facility with appropriate resources  1/30/2025 0008 by Jeb Oleary RN  Outcome: Progressing  1/29/2025 1057 by Blessing Gmoez RN  Outcome: Progressing     Problem: Skin/Tissue Integrity  Goal: Skin integrity remains intact  Description: 1.  Monitor for areas of redness and/or skin breakdown  2.  Assess vascular access sites hourly  3.  Every 4-6 hours minimum:  Change oxygen saturation probe site  4.  Every 4-6 hours:  If on nasal continuous positive airway pressure, respiratory therapy assess nares and determine need for appliance change or resting period  1/30/2025 0008 by Jeb Oleary RN  Outcome: Progressing  1/29/2025 1057 by Blessing Gomez RN  Outcome: Progressing     Problem: Confusion  Goal: Confusion, delirium, dementia, or psychosis is improved or at baseline  Description: INTERVENTIONS:  1. Assess for possible contributors to thought disturbance, including medications, impaired vision or hearing, underlying metabolic abnormalities, dehydration, psychiatric diagnoses, and notify attending LIP  2. [place] high risk fall precautions, as indicated  3. Provide frequent short contacts to provide reality reorientation, refocusing and direction  4. Decrease environmental stimuli, including noise as appropriate  5. Monitor and intervene to maintain adequate nutrition, hydration, elimination, sleep and activity  6. If unable to ensure safety without constant attention obtain sitter and review sitter

## 2025-01-30 NOTE — PROGRESS NOTES
Pt bp still elevated (150/80) after treating with PRN hydralazine. Per Luz Maria Frazier NP, repeat BP in 1 h.

## 2025-01-30 NOTE — MANAGEMENT PLAN
A1C  7.2,   Diabetes educator consult not needed at this time due to patient status.    Genoveva Ga RN, BSN, Stroke Navigator

## 2025-01-31 LAB
ANION GAP SERPL CALCULATED.3IONS-SCNC: 14 MMOL/L (ref 7–16)
BUN SERPL-MCNC: 22 MG/DL (ref 6–23)
CALCIUM SERPL-MCNC: 9.3 MG/DL (ref 8.6–10.2)
CHLORIDE SERPL-SCNC: 105 MMOL/L (ref 98–107)
CO2 SERPL-SCNC: 24 MMOL/L (ref 22–29)
CREAT SERPL-MCNC: 1.4 MG/DL (ref 0.7–1.2)
EKG ATRIAL RATE: 89 BPM
EKG ATRIAL RATE: 97 BPM
EKG P AXIS: 36 DEGREES
EKG P AXIS: 49 DEGREES
EKG P-R INTERVAL: 150 MS
EKG P-R INTERVAL: 172 MS
EKG Q-T INTERVAL: 394 MS
EKG Q-T INTERVAL: 408 MS
EKG QRS DURATION: 132 MS
EKG QRS DURATION: 142 MS
EKG QTC CALCULATION (BAZETT): 496 MS
EKG QTC CALCULATION (BAZETT): 500 MS
EKG R AXIS: -14 DEGREES
EKG R AXIS: -4 DEGREES
EKG T AXIS: 7 DEGREES
EKG T AXIS: 7 DEGREES
EKG VENTRICULAR RATE: 89 BPM
EKG VENTRICULAR RATE: 97 BPM
ERYTHROCYTE [DISTWIDTH] IN BLOOD BY AUTOMATED COUNT: 13.1 % (ref 11.5–15)
GFR, ESTIMATED: 50 ML/MIN/1.73M2
GLUCOSE BLD-MCNC: 129 MG/DL (ref 74–99)
GLUCOSE BLD-MCNC: 149 MG/DL (ref 74–99)
GLUCOSE BLD-MCNC: 163 MG/DL (ref 74–99)
GLUCOSE BLD-MCNC: 93 MG/DL (ref 74–99)
GLUCOSE SERPL-MCNC: 101 MG/DL (ref 74–99)
HCT VFR BLD AUTO: 41.8 % (ref 37–54)
HGB BLD-MCNC: 13.7 G/DL (ref 12.5–16.5)
MCH RBC QN AUTO: 31.9 PG (ref 26–35)
MCHC RBC AUTO-ENTMCNC: 32.8 G/DL (ref 32–34.5)
MCV RBC AUTO: 97.2 FL (ref 80–99.9)
PLATELET # BLD AUTO: 148 K/UL (ref 130–450)
PMV BLD AUTO: 9.9 FL (ref 7–12)
POTASSIUM SERPL-SCNC: 4.1 MMOL/L (ref 3.5–5)
RBC # BLD AUTO: 4.3 M/UL (ref 3.8–5.8)
SODIUM SERPL-SCNC: 143 MMOL/L (ref 132–146)
WBC OTHER # BLD: 7.7 K/UL (ref 4.5–11.5)

## 2025-01-31 PROCEDURE — 80048 BASIC METABOLIC PNL TOTAL CA: CPT

## 2025-01-31 PROCEDURE — 2700000000 HC OXYGEN THERAPY PER DAY

## 2025-01-31 PROCEDURE — 6370000000 HC RX 637 (ALT 250 FOR IP): Performed by: NURSE PRACTITIONER

## 2025-01-31 PROCEDURE — 99232 SBSQ HOSP IP/OBS MODERATE 35: CPT | Performed by: PHYSICIAN ASSISTANT

## 2025-01-31 PROCEDURE — 36415 COLL VENOUS BLD VENIPUNCTURE: CPT

## 2025-01-31 PROCEDURE — 6370000000 HC RX 637 (ALT 250 FOR IP)

## 2025-01-31 PROCEDURE — 97530 THERAPEUTIC ACTIVITIES: CPT

## 2025-01-31 PROCEDURE — 2500000003 HC RX 250 WO HCPCS

## 2025-01-31 PROCEDURE — 93010 ELECTROCARDIOGRAM REPORT: CPT | Performed by: INTERNAL MEDICINE

## 2025-01-31 PROCEDURE — 92526 ORAL FUNCTION THERAPY: CPT | Performed by: SPEECH-LANGUAGE PATHOLOGIST

## 2025-01-31 PROCEDURE — 2060000000 HC ICU INTERMEDIATE R&B

## 2025-01-31 PROCEDURE — 2500000003 HC RX 250 WO HCPCS: Performed by: NURSE PRACTITIONER

## 2025-01-31 PROCEDURE — 2580000003 HC RX 258: Performed by: NURSE PRACTITIONER

## 2025-01-31 PROCEDURE — 85027 COMPLETE CBC AUTOMATED: CPT

## 2025-01-31 PROCEDURE — 82962 GLUCOSE BLOOD TEST: CPT

## 2025-01-31 PROCEDURE — 97535 SELF CARE MNGMENT TRAINING: CPT

## 2025-01-31 RX ORDER — DEXTROSE MONOHYDRATE 100 MG/ML
INJECTION, SOLUTION INTRAVENOUS CONTINUOUS PRN
Status: DISCONTINUED | OUTPATIENT
Start: 2025-01-31 | End: 2025-02-04 | Stop reason: HOSPADM

## 2025-01-31 RX ADMIN — SODIUM CHLORIDE 500 MG: 9 INJECTION, SOLUTION INTRAVENOUS at 23:17

## 2025-01-31 RX ADMIN — METOPROLOL TARTRATE 2.5 MG: 1 INJECTION, SOLUTION INTRAVENOUS at 05:17

## 2025-01-31 RX ADMIN — SODIUM CHLORIDE, PRESERVATIVE FREE 10 ML: 5 INJECTION INTRAVENOUS at 09:46

## 2025-01-31 RX ADMIN — ATORVASTATIN CALCIUM 40 MG: 40 TABLET, FILM COATED ORAL at 20:26

## 2025-01-31 RX ADMIN — CARBIDOPA AND LEVODOPA 1 TABLET: 25; 100 TABLET ORAL at 12:06

## 2025-01-31 RX ADMIN — GLIPIZIDE 5 MG: 5 TABLET ORAL at 20:26

## 2025-01-31 RX ADMIN — DIVALPROEX SODIUM 500 MG: 125 CAPSULE, COATED PELLETS ORAL at 14:21

## 2025-01-31 RX ADMIN — APIXABAN 5 MG: 5 TABLET, FILM COATED ORAL at 09:43

## 2025-01-31 RX ADMIN — CARBIDOPA AND LEVODOPA 2 TABLET: 25; 100 TABLET ORAL at 09:45

## 2025-01-31 RX ADMIN — GABAPENTIN 600 MG: 300 CAPSULE ORAL at 09:42

## 2025-01-31 RX ADMIN — SODIUM CHLORIDE 500 MG: 9 INJECTION, SOLUTION INTRAVENOUS at 06:21

## 2025-01-31 RX ADMIN — APIXABAN 5 MG: 5 TABLET, FILM COATED ORAL at 20:26

## 2025-01-31 RX ADMIN — METOPROLOL TARTRATE 2.5 MG: 1 INJECTION, SOLUTION INTRAVENOUS at 23:17

## 2025-01-31 RX ADMIN — ASPIRIN 81 MG CHEWABLE TABLET 81 MG: 81 TABLET CHEWABLE at 09:42

## 2025-01-31 RX ADMIN — SODIUM CHLORIDE, PRESERVATIVE FREE 10 ML: 5 INJECTION INTRAVENOUS at 20:27

## 2025-01-31 RX ADMIN — RASAGILINE 1 MG: 1 TABLET ORAL at 09:43

## 2025-01-31 RX ADMIN — METOPROLOL TARTRATE 2.5 MG: 1 INJECTION, SOLUTION INTRAVENOUS at 12:06

## 2025-01-31 RX ADMIN — CARBIDOPA AND LEVODOPA 2 TABLET: 25; 100 TABLET ORAL at 17:35

## 2025-01-31 RX ADMIN — GABAPENTIN 600 MG: 300 CAPSULE ORAL at 14:21

## 2025-01-31 RX ADMIN — GABAPENTIN 600 MG: 300 CAPSULE ORAL at 20:26

## 2025-01-31 NOTE — PLAN OF CARE
Problem: Safety - Adult  Goal: Free from fall injury  1/30/2025 2245 by Yuriy Cordero RN  Outcome: Progressing  1/30/2025 1018 by Blessing Gomez RN  Outcome: Progressing     Problem: Chronic Conditions and Co-morbidities  Goal: Patient's chronic conditions and co-morbidity symptoms are monitored and maintained or improved  1/30/2025 2245 by Yuriy Cordero RN  Outcome: Progressing  1/30/2025 1018 by Blessing Gomez RN  Outcome: Progressing     Problem: Discharge Planning  Goal: Discharge to home or other facility with appropriate resources  1/30/2025 2245 by Yuriy Cordero RN  Outcome: Progressing  1/30/2025 1018 by Blessing Gomez RN  Outcome: Progressing     Problem: Skin/Tissue Integrity  Goal: Skin integrity remains intact  Description: 1.  Monitor for areas of redness and/or skin breakdown  2.  Assess vascular access sites hourly  3.  Every 4-6 hours minimum:  Change oxygen saturation probe site  4.  Every 4-6 hours:  If on nasal continuous positive airway pressure, respiratory therapy assess nares and determine need for appliance change or resting period  1/30/2025 2245 by Yuriy Cordero RN  Outcome: Progressing  1/30/2025 1018 by Blessing Gomez RN  Outcome: Progressing     Problem: Confusion  Goal: Confusion, delirium, dementia, or psychosis is improved or at baseline  Description: INTERVENTIONS:  1. Assess for possible contributors to thought disturbance, including medications, impaired vision or hearing, underlying metabolic abnormalities, dehydration, psychiatric diagnoses, and notify attending LIP  2. [place] high risk fall precautions, as indicated  3. Provide frequent short contacts to provide reality reorientation, refocusing and direction  4. Decrease environmental stimuli, including noise as appropriate  5. Monitor and intervene to maintain adequate nutrition, hydration, elimination, sleep and activity  6. If unable to ensure safety without constant attention obtain sitter and review sitter guidelines with

## 2025-01-31 NOTE — PROGRESS NOTES
Spoke with April NP regarding US positive for occlusive thrombus in the L peroneal vein. Spoke with April that pt would be getting his night time dose of eliquis because pt did pass barium swallow and now has diet

## 2025-01-31 NOTE — PROGRESS NOTES
[facility]                                Progress note    Subjective:  Comfortable no acute distress  Sleeping soundly    Objective:    BP (!) 102/56   Pulse 100   Temp 97.4 °F (36.3 °C) (Temporal)   Resp 18   Wt 119.2 kg (262 lb 12.8 oz)   SpO2 91%   BMI 35.64 kg/m²   CONST:  Well developed, obese elderly  male who appears stated age. Awake, alert, uncooperative, no apparent distress  HEENT:   Head- Normocephalic, atraumatic   Eyes- Conjunctivae pink, anicteric  Throat- Oral mucosa pink and moist  Neck-  No stridor, trachea midline, no jugular venous distention. No adenopathy   CHEST: Chest symmetrical and non-tender to palpation. No accessory muscle use or intercostal retractions  RESPIRATORY: Lung sounds - clear throughout fields   CARDIOVASCULAR:     No carotid bruit  Heart Inspection- shows no noted pulsations  Heart Palpation- no heaves or thrills; PMI is non-displaced   Heart Ausculation- Regular rate and rhythm, no murmur. No s3, s4 or rub   PV: No lower extremity edema. No varicosities. Pedal pulses palpable, no clubbing or cyanosis   ABDOMEN: Soft, obese, non-tender to light palpation. Bowel sounds present. No palpable masses no organomegaly; no abdominal bruit  MS: Good muscle strength and tone. No atrophy or abnormal movements.   : Deferred  SKIN: Warm and dry no statis dermatitis or ulcers   NEURO / PSYCH: Expressive aphasia noted     Recent Labs     01/29/25  0451 01/30/25  0457 01/31/25  0548   WBC 7.3 6.9 7.7   HGB 14.2 14.7 13.7   HCT 43.3 42.9 41.8   * 141 148       Recent Labs     01/29/25  0451 01/30/25  0457 01/31/25  0548    139 143   K 4.0 4.1 4.1    103 105   CO2 23 21* 24   BUN 14 15 22   CREATININE 1.3* 1.3* 1.4*   CALCIUM 9.4 9.4 9.3     01/28/2025 MRI Brain WO contrast:  1. Large acute anterior and mid left MCA inferior division infarct,  corresponding to the CT head and CT perfusion studies from yesterday. No sign of hemorrhage or

## 2025-01-31 NOTE — PROGRESS NOTES
Occupational Therapy  OT BEDSIDE TREATMENT NOTE   [facility]  [address], [place]       Date:2025  Patient Name: Frankie Hernández  MRN: [id number]  : 1945  Room: [facility]850-     Per OT Eval:    Evaluating OT: Pedro Goodman OTR/L [id number]     Referring Provider: Sofia Healy APRN - CNP                                     Specific Provider Orders/Date: OT evaluation and treatment 25 [id number]     Diagnosis:  Stroke due to occlusion of left middle cerebral artery (HCC) [I63.512]  Cerebrovascular accident (CVA) due to occlusion of middle cerebral artery, unspecified blood vessel laterality (HCC) [I63.519]       Pertinent Medical History:  has a past medical history of Acute left lumbar radiculopathy, YUDY (acute kidney injury) (HCC), Anxiety and depression, Cancer (HCC), Cerebral atrophy (HCC), Diabetes mellitus (HCC), Disc displacement, lumbar, History of pulmonary embolus (PE), Hx of deep venous thrombosis, Kidney disease, Nerve injury, Parkinson disease (HCC), Parkinson disease (HCC), Pneumonia, Pulmonary embolism (HCC), Renal carcinoma (HCC), Rhinovirus infection, and 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   Past Surgical History         Past Surgical History:   Procedure Laterality Date    BACK SURGERY        CATARACT REMOVAL        ENDOSCOPY, COLON, DIAGNOSTIC        EYE SURGERY        INVASIVE VASCULAR N/A 2024     Vena cava filter insertion - cath lab performed by Rafal Skelton MD at [facility]    INVASIVE VASCULAR N/A 2024     Remove vena cava filter performed by Rafal Skelton MD at [facility]    [facility]C FILTER INSERTION   2013     [facility]    IVC FILTER REMOVAL   2014     [facility]    KNEE SURGERY Left      TOTAL NEPHRECTOMY Right 2013     Renal cell carcinoma         Pt presented to the emergency dept on  with L facial droop and headache   MRI brain pending

## 2025-01-31 NOTE — PLAN OF CARE
Problem: Safety - Adult  Goal: Free from fall injury  1/31/2025 1018 by Blessing Gomez RN  Outcome: Progressing  1/30/2025 2245 by Yuriy Cordero RN  Outcome: Progressing     Problem: Chronic Conditions and Co-morbidities  Goal: Patient's chronic conditions and co-morbidity symptoms are monitored and maintained or improved  1/31/2025 1018 by Blessing Gomez RN  Outcome: Progressing  1/30/2025 2245 by Yuriy Cordero RN  Outcome: Progressing     Problem: Discharge Planning  Goal: Discharge to home or other facility with appropriate resources  1/31/2025 1018 by Blessing Gomez RN  Outcome: Progressing  1/30/2025 2245 by Yuriy Cordero RN  Outcome: Progressing     Problem: Skin/Tissue Integrity  Goal: Skin integrity remains intact  Description: 1.  Monitor for areas of redness and/or skin breakdown  2.  Assess vascular access sites hourly  3.  Every 4-6 hours minimum:  Change oxygen saturation probe site  4.  Every 4-6 hours:  If on nasal continuous positive airway pressure, respiratory therapy assess nares and determine need for appliance change or resting period  1/31/2025 1018 by Blessing Gomez RN  Outcome: Progressing  1/30/2025 2245 by Yuriy Cordero RN  Outcome: Progressing     Problem: Confusion  Goal: Confusion, delirium, dementia, or psychosis is improved or at baseline  Description: INTERVENTIONS:  1. Assess for possible contributors to thought disturbance, including medications, impaired vision or hearing, underlying metabolic abnormalities, dehydration, psychiatric diagnoses, and notify attending LIP  2. [place] high risk fall precautions, as indicated  3. Provide frequent short contacts to provide reality reorientation, refocusing and direction  4. Decrease environmental stimuli, including noise as appropriate  5. Monitor and intervene to maintain adequate nutrition, hydration, elimination, sleep and activity  6. If unable to ensure safety without constant attention obtain sitter and review sitter guidelines with

## 2025-01-31 NOTE — PROGRESS NOTES
[facility]  Neuro Inpatient Follow Up       Frankie Hernández is a 80 y.o. right handed man    Neurology is following for acute ischemic left MCA left M3 occlusion    Sig PMH: renal cell carcinoma s/p right nephrectomy, PE/DVT on Eliquis, cerebral atrophy, type 2 diabetes, Parkinson's disease, kidney disease    HPI  The patient presented to the ER on 1/27 with facial drooping, difficulty speaking, and headache. On the day of arrival, he was not himself, having more difficulty speaking, a facial droop, and could not get up. He then cried out \"my head my head\". Due to his parkinsonism, at baseline he has quieter speech, but otherwise functions normally with no cognitive decline. He uses a walker    In the ER, NIH was 8.  Initial ER stroke imaging revealed distal left m2/M3 occlusion and a perfusion mismatch with ischemic penumbra in his left temporal lobe. He was evaluated via telestroke by Dr. Chang and felt not to be a TNK or endovascular candidate.  He is on Eliquis and the M3 clot was too distal to intervene.      Aspirin was added to Eliquis    MRI showed large posterior L MCA stroke with no hemorrhage    He was diagnosed with parkinsonism by Dr Dawn in 2012 is currently on Sinemet 2 tabs AM, 1 tab noon, 2 tabs PM, 1 tab HS.    1/30:  mg every 8 hours added for agitation    Subjective:  Despite Depakote he remains very agitated in bed and is moaning, reporting a bad headache as well as left lower extremity pain.  His wife is at the bedside and very upset that he is in pain and so restless..  He remains n.p.o. and attempts at NG tube placement were unsuccessful, repeat swallow study is pending.    SBPs have been in the 140s to 160s.    He is talking a bit better today, but not back to baseline.    1/31/25  Patient is sleeping on my arrival, wife and daughter  at bedside and reports that he has been sleeping most of the morning, ativan was given around  at 1255 on  age, hypomimia  Head: normocephalic, without obvious abnormality, atraumatic  Eyes: conjunctivae/corneas clear. .  Neck: limited ROM with no cogwheeling  Lungs: Nonlabored on room air  Extremities: normal, atraumatic, no cyanosis or edema  Skin: color, texture, turgor normal---no rashes or lesions    Mental Status: somnolent, but will wake when name is called but quickly falls back asleep. Following simple commands.     Speech: Hypophonic  Language: Speaking a few words more clearly today    Cranial Nerves:  I: smell    II: visual acuity     II: visual fields B/l threat   II: pupils JAMES   III,VII: ptosis None   III,IV,VI: extraocular muscles  Tracks examiner   V: mastication Normal   V: facial light touch sensation  Appears normal   V,VII: corneal reflex     VII: facial muscle function - upper  Normal   VII: facial muscle function - lower R facial droop   VIII: hearing Normal   IX: soft palate elevation     IX,X: gag reflex    XI: trapezius strength  5/5   XI: sternocleidomastoid strength 5/5   XI: neck extension strength  5/5   XII: tongue strength  Normal     Motor:  Moving all 4 limbs symmetrically antigravity in bed  Obese bulk  Increased tone both legs, no cogwheeling  No abnormal movements    Sensory:  Appreciates touch in all limbs    Coordination:   Deferred as patient is in too much pain    DTRs:  BE throughout    Right babinski    No other pathological reflexes    Laboratory/Radiology:     CBC with Differential:    Lab Results   Component Value Date/Time    WBC 7.7 01/31/2025 05:48 AM    RBC 4.30 01/31/2025 05:48 AM    HGB 13.7 01/31/2025 05:48 AM    HCT 41.8 01/31/2025 05:48 AM     01/31/2025 05:48 AM    MCV 97.2 01/31/2025 05:48 AM    MCH 31.9 01/31/2025 05:48 AM    MCHC 32.8 01/31/2025 05:48 AM    RDW 13.1 01/31/2025 05:48 AM    LYMPHOPCT 26 01/27/2025 04:10 PM    MONOPCT 10 01/27/2025 04:10 PM    EOSPCT 1 01/27/2025 04:10 PM    BASOPCT 1 01/27/2025 04:10 PM    MONOSABS 0.78 01/27/2025 04:10 PM  Body aches    Gastroesophageal reflux disease, esophagitis presence not specified    Hyperlipidemia, unspecified hyperlipidemia type    Hypothyroidism    Osteopenia, unspecified location    Pain  left knee  Prediabetes    Primary osteoarthritis of left knee    Tear of meniscus of left knee as current injury, unspecified meniscus, unspecified tear type, initial encounter

## 2025-01-31 NOTE — PROGRESS NOTES
Notified of LLE ultrasound results positive for DVT L peronealvein. Called and spoke with RN who stated CT head also done and no hemorrhage. Patient is now taking PO. Advised RN to notify PCP of US results and make sure patient takes his Eliquis today.  Neuro team to follow up tomorrow

## 2025-01-31 NOTE — PROGRESS NOTES
Speech Language Pathology      NAME:  Frankie Hernández  :  1945  DATE: 2025  ROOM:  [address] [place], [place] [address]    Seen for ongoing dysphagia management at lunch meal. Pt sleepy- wife attempting to feed however alerntess limited; SLP edu'd wife and daughter to refrain from assisting with PO until Pt is more alert. Understanding voiced.     Reviewed results/ recommendations from MBSS completed yesterday. All questions answered.     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

## 2025-01-31 NOTE — PROGRESS NOTES
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 cerebral artery (HCC) [I63.512]  Cerebrovascular accident (CVA) due to occlusion of middle cerebral artery, unspecified blood vessel laterality (HCC) [I63.519]  PMHx/PSHx:   has a past medical history of Acute left lumbar radiculopathy, YUDY (acute kidney injury) ([facility]), Anxiety and depression, Cerebral atrophy (HCC), Disc displacement, lumbar, Hx of deep venous thrombosis, Kidney disease, Parkinson disease (HCC), Pneumonia, Pulmonary embolism (HCC), Renal carcinoma ([facility]), Rhinovirus infection, 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    Procedure/Surgery:  none this admission  Precautions: Falls,  speech, Hx parkinson's disease, +alarms, bedside sitter     SUBJECTIVE:    Per chart, Pt lives with his wife in a 1 story home with 1+1 stairs to enter and ? rail(s).  Bed is on 1st floor and bath is on 1st floor.  Pt ambulated with ? PTA. Pt appears to be aphasic, unable to contribute to subjective exam.     Equipment Owned: rollator, WW, cane  Equipment Needs:  TBD    OBJECTIVE:   Initial Evaluation  Date: 25 Treatment  Date: 25 Short Term/ Long Term   Goals   AM-PAC 6 Clicks 15/24 - pt would benefit from intensive rehabilitation program      Was pt agreeable to Eval/treatment? Yes  Yes     Does pt have pain? No complaints  None     Bed Mobility  Rolling: Perry  Supine to sit: Perry  Sit to supine: NT  Scooting: [place] Rolling mod A   Supine to sit mod A  Sit to supine mod A   Scooting mod A  Rolling: Independent  Supine to sit: Independent  Sit to supine: Independent  Scooting: Independent   Transfers Sit to stand: Perry  Stand to sit: Perry  Stand pivot: ModA no AD Sit to stand mod A  Stand to sit mod A    Sitting on edge of bed to promote upright tolerance and postural awareness.   Transfers: Verbal instruction for hand placement and technique to improve safety and balance. Assistance required to complete task.   Gait Training: Verbal instruction for walker management and approximation to improve postural stability and safety. Assistance required to complete task.  Pt's/ family goals   1. Get better, return to PLOF    Prognosis is good for reaching above PT goals.    Patient and or family understand(s) diagnosis, prognosis, and plan of care.  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     Current Treatment Recommendations:     [x] Strengthening to improve independence with functional mobility   [] ROM to improve independence with functional mobility   [x] Balance Training to improve static/dynamic balance and to reduce fall risk  [x] Endurance Training to improve activity tolerance during functional mobility   [x] Transfer Training to improve safety and independence with all functional transfers   [x] Gait Training to improve gait mechanics, endurance and assess need for appropriate assistive device  [x] Stair Training in preparation for safe discharge home and/or into the community   [x] Positioning to prevent skin breakdown and contractures  [x] Safety and Education Training   [x] Patient/Caregiver Education   [x] HEP  [] Other     PT long term

## 2025-01-31 NOTE — CARE COORDINATION
SOCIAL WORK/CASEMANAGEMENT TRANSITION OF CARE PLANNING( RUTHIE OMER, -[phone]):  pt passed his MBBS yesterday with recommendation of easy to chew. [facility] are following pt. Precert is needed. Pt is on iv lopressor and 3l o2 nc. Sitter or wife is at bedside. Zio on discharge. Neurology is following. Pt has a Occlusive thrombus in the left peroneal vein on eliquis. Increased anxiety per rn with ativan provided. Will follow. Pt will be here over weekend. Ruthie Omer, LAURIE  1/31/2025    I met with pt , wife and 2 daughters and told them that [facility] is following pt as is [facility]. LAURIE Pulido  1/31/2025

## 2025-02-01 LAB
ANION GAP SERPL CALCULATED.3IONS-SCNC: 16 MMOL/L (ref 7–16)
BUN SERPL-MCNC: 31 MG/DL (ref 6–23)
CALCIUM SERPL-MCNC: 9 MG/DL (ref 8.6–10.2)
CHLORIDE SERPL-SCNC: 107 MMOL/L (ref 98–107)
CO2 SERPL-SCNC: 19 MMOL/L (ref 22–29)
CREAT SERPL-MCNC: 1.5 MG/DL (ref 0.7–1.2)
ERYTHROCYTE [DISTWIDTH] IN BLOOD BY AUTOMATED COUNT: 13.2 % (ref 11.5–15)
GFR, ESTIMATED: 47 ML/MIN/1.73M2
GLUCOSE BLD-MCNC: 120 MG/DL (ref 74–99)
GLUCOSE BLD-MCNC: 169 MG/DL (ref 74–99)
GLUCOSE BLD-MCNC: 179 MG/DL (ref 74–99)
GLUCOSE BLD-MCNC: 74 MG/DL (ref 74–99)
GLUCOSE SERPL-MCNC: 69 MG/DL (ref 74–99)
HCT VFR BLD AUTO: 44 % (ref 37–54)
HGB BLD-MCNC: 14.3 G/DL (ref 12.5–16.5)
MCH RBC QN AUTO: 31.8 PG (ref 26–35)
MCHC RBC AUTO-ENTMCNC: 32.5 G/DL (ref 32–34.5)
MCV RBC AUTO: 97.8 FL (ref 80–99.9)
PLATELET # BLD AUTO: 146 K/UL (ref 130–450)
PMV BLD AUTO: 10 FL (ref 7–12)
POTASSIUM SERPL-SCNC: 4.2 MMOL/L (ref 3.5–5)
RBC # BLD AUTO: 4.5 M/UL (ref 3.8–5.8)
SODIUM SERPL-SCNC: 142 MMOL/L (ref 132–146)
WBC OTHER # BLD: 7.2 K/UL (ref 4.5–11.5)

## 2025-02-01 PROCEDURE — 2500000003 HC RX 250 WO HCPCS: Performed by: NURSE PRACTITIONER

## 2025-02-01 PROCEDURE — 80048 BASIC METABOLIC PNL TOTAL CA: CPT

## 2025-02-01 PROCEDURE — 6370000000 HC RX 637 (ALT 250 FOR IP)

## 2025-02-01 PROCEDURE — 6360000002 HC RX W HCPCS: Performed by: NURSE PRACTITIONER

## 2025-02-01 PROCEDURE — 85027 COMPLETE CBC AUTOMATED: CPT

## 2025-02-01 PROCEDURE — 94640 AIRWAY INHALATION TREATMENT: CPT

## 2025-02-01 PROCEDURE — 6370000000 HC RX 637 (ALT 250 FOR IP): Performed by: NURSE PRACTITIONER

## 2025-02-01 PROCEDURE — 36415 COLL VENOUS BLD VENIPUNCTURE: CPT

## 2025-02-01 PROCEDURE — 2580000003 HC RX 258: Performed by: NURSE PRACTITIONER

## 2025-02-01 PROCEDURE — 2500000003 HC RX 250 WO HCPCS

## 2025-02-01 PROCEDURE — 2060000000 HC ICU INTERMEDIATE R&B

## 2025-02-01 PROCEDURE — 82962 GLUCOSE BLOOD TEST: CPT

## 2025-02-01 RX ORDER — DEXTROSE MONOHYDRATE AND SODIUM CHLORIDE 5; .45 G/100ML; G/100ML
INJECTION, SOLUTION INTRAVENOUS CONTINUOUS
Status: DISCONTINUED | OUTPATIENT
Start: 2025-02-01 | End: 2025-02-04 | Stop reason: HOSPADM

## 2025-02-01 RX ADMIN — DIVALPROEX SODIUM 500 MG: 125 CAPSULE, COATED PELLETS ORAL at 14:50

## 2025-02-01 RX ADMIN — IPRATROPIUM BROMIDE AND ALBUTEROL SULFATE 1 DOSE: 2.5; .5 SOLUTION RESPIRATORY (INHALATION) at 07:43

## 2025-02-01 RX ADMIN — APIXABAN 5 MG: 5 TABLET, FILM COATED ORAL at 08:32

## 2025-02-01 RX ADMIN — ATORVASTATIN CALCIUM 40 MG: 40 TABLET, FILM COATED ORAL at 22:06

## 2025-02-01 RX ADMIN — SODIUM CHLORIDE 500 MG: 9 INJECTION, SOLUTION INTRAVENOUS at 06:24

## 2025-02-01 RX ADMIN — CARBIDOPA AND LEVODOPA 2 TABLET: 25; 100 TABLET ORAL at 08:32

## 2025-02-01 RX ADMIN — CARBIDOPA AND LEVODOPA 2 TABLET: 25; 100 TABLET ORAL at 16:59

## 2025-02-01 RX ADMIN — ASPIRIN 81 MG CHEWABLE TABLET 81 MG: 81 TABLET CHEWABLE at 08:32

## 2025-02-01 RX ADMIN — GABAPENTIN 600 MG: 300 CAPSULE ORAL at 22:05

## 2025-02-01 RX ADMIN — DEXTROSE AND SODIUM CHLORIDE: 5; 450 INJECTION, SOLUTION INTRAVENOUS at 15:38

## 2025-02-01 RX ADMIN — APIXABAN 5 MG: 5 TABLET, FILM COATED ORAL at 22:06

## 2025-02-01 RX ADMIN — CARBIDOPA AND LEVODOPA 1 TABLET: 25; 100 TABLET ORAL at 22:08

## 2025-02-01 RX ADMIN — LORAZEPAM 1 MG: 2 INJECTION INTRAMUSCULAR; INTRAVENOUS at 22:13

## 2025-02-01 RX ADMIN — DIVALPROEX SODIUM 500 MG: 125 CAPSULE, COATED PELLETS ORAL at 22:07

## 2025-02-01 RX ADMIN — METOPROLOL TARTRATE 2.5 MG: 1 INJECTION, SOLUTION INTRAVENOUS at 05:44

## 2025-02-01 RX ADMIN — GABAPENTIN 600 MG: 300 CAPSULE ORAL at 08:32

## 2025-02-01 RX ADMIN — SODIUM CHLORIDE, PRESERVATIVE FREE 10 ML: 5 INJECTION INTRAVENOUS at 22:07

## 2025-02-01 RX ADMIN — RASAGILINE 1 MG: 1 TABLET ORAL at 08:32

## 2025-02-01 RX ADMIN — GLIPIZIDE 5 MG: 5 TABLET ORAL at 22:07

## 2025-02-01 RX ADMIN — CARBIDOPA AND LEVODOPA 1 TABLET: 25; 100 TABLET ORAL at 12:15

## 2025-02-01 RX ADMIN — SODIUM CHLORIDE, PRESERVATIVE FREE 10 ML: 5 INJECTION INTRAVENOUS at 08:33

## 2025-02-01 RX ADMIN — GABAPENTIN 600 MG: 300 CAPSULE ORAL at 14:50

## 2025-02-01 NOTE — PROGRESS NOTES
[facility]                                Progress note    Subjective:  Comfortable no acute distress  Sleeping soundly    Objective:  Sitting up in bed, appears comfortable  Wife present at the bedside, all questions answered  She states that he has been eating and drinking without issues   /77   Pulse 92   Temp 98.1 °F (36.7 °C) (Oral)   Resp 18   Wt 119.2 kg (262 lb 12.8 oz)   SpO2 91%   BMI 35.64 kg/m²   CONST:  Well developed, obese elderly  male who appears stated age. Awake, alert, uncooperative, no apparent distress  HEENT:   Head- Normocephalic, atraumatic   Eyes- Conjunctivae pink, anicteric  Throat- Oral mucosa pink and moist  Neck-  No stridor, trachea midline, no jugular venous distention. No adenopathy   CHEST: Chest symmetrical and non-tender to palpation. No accessory muscle use or intercostal retractions  RESPIRATORY: Lung sounds - clear throughout fields   CARDIOVASCULAR:     No carotid bruit  Heart Inspection- shows no noted pulsations  Heart Palpation- no heaves or thrills; PMI is non-displaced   Heart Ausculation- Regular rate and rhythm, no murmur. No s3, s4 or rub   PV: No lower extremity edema. No varicosities. Pedal pulses palpable, no clubbing or cyanosis   ABDOMEN: Soft, obese, non-tender to light palpation. Bowel sounds present. No palpable masses no organomegaly; no abdominal bruit  MS: Good muscle strength and tone. No atrophy or abnormal movements.   : Deferred  SKIN: Warm and dry no statis dermatitis or ulcers   NEURO / PSYCH: Expressive aphasia noted     Recent Labs     01/30/25  0457 01/31/25  0548 02/01/25  0443   WBC 6.9 7.7 7.2   HGB 14.7 13.7 14.3   HCT 42.9 41.8 44.0    148 146       Recent Labs     01/30/25  0457 01/31/25  0548 02/01/25  0443    143 142   K 4.1 4.1 4.2    105 107   CO2 21* 24 19*   BUN 15 22 31*   CREATININE 1.3* 1.4* 1.5*   CALCIUM 9.4 9.3 9.0     01/28/2025 MRI Brain WO contrast:  1. Large acute  Mellitus  -Monitor labs  -ISS glucose control  -Hypoglycemia protocol initiated  PT OT evaluation in preparation for discharge will likely require placement  All other labs and vitals are unremarkable  There was some question of patient complaining of chest heaviness and pain-she denies this to me and is comfortable    01/29/2025  Large acute anterior and mid left MCA inferior division infarct per MRI of the brain with residual mixed aphasia  Continue Eliquis and ASA per Neurology  Monitor VS  Goal BP <140/90  Continue statin  Goal LDL <70  T2DM with goal HA1C <7%  Continue SSI   Prolonged QTc  Avoid prolonging QT agents  PT OT  YUDY with BUN/SCr 14/1.3  Monitor BMP, CBC  TTE pending  Failed swallow evaluation with SLP, NG to be placed per Neurology recommendation per nursing staff    01/30/2025  Large acute anterior and mid left MCA inferior division infarct per MRI of the brain with residual mixed aphasia  CT of the head pending   Continue Eliquis and ASA per Neurology  HTN: Needs more control  Goal BP <140/90  Remains strict NPO  Hold Norvasc, start Lopressor 2.5 mg IV every 6 hours with holding parameters  Continue as needed Hydralazine  Continue statin  Goal LDL <70  T2DM with goal HA1C <7%  Continue SSI   Prolonged QTc  Avoid prolonging QT agents  PT OT  YUDY with BUN/SCr 14/1.3  Monitor BMP, CBC  TTE with EF 55-60%, severe concentric hypertrophy, bubble study negative, epicardial fat, trivial pericardial effusion without tamponade  Failed swallow evaluation with SLP, NG to be placed per Neurology recommendation --> attempted twice, unsuccessful  Awaiting MBSS  PT OT  AM PAC 11/24 1/31/2025  Will need pre-CERT apparently for discharge to [place]  He is sleeping on my evaluation but family members are at bedside.  He passed swallow evaluation-and was able to tolerate a p.o. diet appropriately  Head CT from yesterday within normal limits no evidence of hemorrhagic conversion of stroke  Continue oral  anticoagulation with Eliquis as well as aspirin  Okay for discharge if pre-CERT comes through-medically stable  Discussed with family members at bedside    02/01/2025  Large acute anterior and mid left MCA inferior division infarct per MRI of the brain with residual mixed aphasia  CT of the head 01/30 with increased edema consistent with subacute infarction  Continue Eliquis and ASA per Neurology  HTN: Controlled  Goal BP <140/90  Resume Norvasc with holding parameters as he is now tolerating a diet  Continue as needed Hydralazine  Continue statin  Goal LDL <70  T2DM with goal HA1C <7%  Continue SSI   Prolonged QTc  Avoid prolonging QT agents  PT OT  YUDY with BUN/SCr 14/1.3 (01/30)-->31/1.5(02/01)  Dark urine, with minimal PO intake per wife  Start IV fluids  Monitor BMP, CBC  TTE with EF 55-60%, severe concentric hypertrophy, bubble study negative,   PT OT  AM PAC 11/24  Discharge planning, awaiting precert        Code status: FULL  Consultants:  Neurology   DVT Prophylaxis Eliquis 5mg bid  PT/OT  Discharge planning     Merly Reese MD  2/1/2025        LIDIA Bedoya - CNP,  3:14 PM  2/1/2025

## 2025-02-01 NOTE — PLAN OF CARE
Problem: Safety - Adult  Goal: Free from fall injury  Outcome: Progressing     Problem: Chronic Conditions and Co-morbidities  Goal: Patient's chronic conditions and co-morbidity symptoms are monitored and maintained or improved  Outcome: Progressing     Problem: Discharge Planning  Goal: Discharge to home or other facility with appropriate resources  Outcome: Progressing     Problem: Skin/Tissue Integrity  Goal: Skin integrity remains intact  Description: 1.  Monitor for areas of redness and/or skin breakdown  2.  Assess vascular access sites hourly  3.  Every 4-6 hours minimum:  Change oxygen saturation probe site  4.  Every 4-6 hours:  If on nasal continuous positive airway pressure, respiratory therapy assess nares and determine need for appliance change or resting period  Outcome: Progressing     Problem: Confusion  Goal: Confusion, delirium, dementia, or psychosis is improved or at baseline  Description: INTERVENTIONS:  1. Assess for possible contributors to thought disturbance, including medications, impaired vision or hearing, underlying metabolic abnormalities, dehydration, psychiatric diagnoses, and notify attending LIP  2. [place] high risk fall precautions, as indicated  3. Provide frequent short contacts to provide reality reorientation, refocusing and direction  4. Decrease environmental stimuli, including noise as appropriate  5. Monitor and intervene to maintain adequate nutrition, hydration, elimination, sleep and activity  6. If unable to ensure safety without constant attention obtain sitter and review sitter guidelines with assigned personnel  7. Initiate Psychosocial CNS and Spiritual Care consult, as indicated  Outcome: Progressing     Problem: Neurosensory - Adult  Goal: Achieves stable or improved neurological status  Outcome: Progressing  Goal: Achieves maximal functionality and self care  Outcome: Progressing  Goal: Absence of seizures  Outcome: Progressing  Goal: Remains free of injury

## 2025-02-02 LAB
ANION GAP SERPL CALCULATED.3IONS-SCNC: 11 MMOL/L (ref 7–16)
BUN SERPL-MCNC: 24 MG/DL (ref 6–23)
CALCIUM SERPL-MCNC: 8.9 MG/DL (ref 8.6–10.2)
CHLORIDE SERPL-SCNC: 108 MMOL/L (ref 98–107)
CO2 SERPL-SCNC: 21 MMOL/L (ref 22–29)
CREAT SERPL-MCNC: 1.1 MG/DL (ref 0.7–1.2)
ERYTHROCYTE [DISTWIDTH] IN BLOOD BY AUTOMATED COUNT: 12.8 % (ref 11.5–15)
GFR, ESTIMATED: 65 ML/MIN/1.73M2
GLUCOSE BLD-MCNC: 120 MG/DL (ref 74–99)
GLUCOSE BLD-MCNC: 153 MG/DL (ref 74–99)
GLUCOSE BLD-MCNC: 160 MG/DL (ref 74–99)
GLUCOSE BLD-MCNC: 68 MG/DL (ref 74–99)
GLUCOSE BLD-MCNC: 77 MG/DL (ref 74–99)
GLUCOSE BLD-MCNC: 94 MG/DL (ref 74–99)
GLUCOSE SERPL-MCNC: 67 MG/DL (ref 74–99)
HCT VFR BLD AUTO: 42.5 % (ref 37–54)
HGB BLD-MCNC: 13.8 G/DL (ref 12.5–16.5)
MCH RBC QN AUTO: 31.5 PG (ref 26–35)
MCHC RBC AUTO-ENTMCNC: 32.5 G/DL (ref 32–34.5)
MCV RBC AUTO: 97 FL (ref 80–99.9)
PLATELET # BLD AUTO: 145 K/UL (ref 130–450)
PMV BLD AUTO: 9.9 FL (ref 7–12)
POTASSIUM SERPL-SCNC: 4.1 MMOL/L (ref 3.5–5)
RBC # BLD AUTO: 4.38 M/UL (ref 3.8–5.8)
SODIUM SERPL-SCNC: 140 MMOL/L (ref 132–146)
WBC OTHER # BLD: 5.9 K/UL (ref 4.5–11.5)

## 2025-02-02 PROCEDURE — 36415 COLL VENOUS BLD VENIPUNCTURE: CPT

## 2025-02-02 PROCEDURE — 6370000000 HC RX 637 (ALT 250 FOR IP): Performed by: NURSE PRACTITIONER

## 2025-02-02 PROCEDURE — 2580000003 HC RX 258: Performed by: NURSE PRACTITIONER

## 2025-02-02 PROCEDURE — 80048 BASIC METABOLIC PNL TOTAL CA: CPT

## 2025-02-02 PROCEDURE — 6360000002 HC RX W HCPCS: Performed by: NURSE PRACTITIONER

## 2025-02-02 PROCEDURE — 93005 ELECTROCARDIOGRAM TRACING: CPT | Performed by: PHYSICIAN ASSISTANT

## 2025-02-02 PROCEDURE — 82962 GLUCOSE BLOOD TEST: CPT

## 2025-02-02 PROCEDURE — 2060000000 HC ICU INTERMEDIATE R&B

## 2025-02-02 PROCEDURE — 85027 COMPLETE CBC AUTOMATED: CPT

## 2025-02-02 PROCEDURE — 2500000003 HC RX 250 WO HCPCS: Performed by: NURSE PRACTITIONER

## 2025-02-02 PROCEDURE — 6360000002 HC RX W HCPCS: Performed by: PHYSICIAN ASSISTANT

## 2025-02-02 PROCEDURE — 6370000000 HC RX 637 (ALT 250 FOR IP)

## 2025-02-02 PROCEDURE — 2500000003 HC RX 250 WO HCPCS

## 2025-02-02 RX ORDER — HYDROXYZINE HYDROCHLORIDE 50 MG/ML
25 INJECTION, SOLUTION INTRAMUSCULAR ONCE AS NEEDED
Status: COMPLETED | OUTPATIENT
Start: 2025-02-02 | End: 2025-02-02

## 2025-02-02 RX ADMIN — CARBIDOPA AND LEVODOPA 1 TABLET: 25; 100 TABLET ORAL at 20:28

## 2025-02-02 RX ADMIN — APIXABAN 5 MG: 5 TABLET, FILM COATED ORAL at 09:09

## 2025-02-02 RX ADMIN — CARBIDOPA AND LEVODOPA 2 TABLET: 25; 100 TABLET ORAL at 16:44

## 2025-02-02 RX ADMIN — DIVALPROEX SODIUM 500 MG: 125 CAPSULE, COATED PELLETS ORAL at 20:28

## 2025-02-02 RX ADMIN — GABAPENTIN 600 MG: 300 CAPSULE ORAL at 20:29

## 2025-02-02 RX ADMIN — SODIUM CHLORIDE, PRESERVATIVE FREE 10 ML: 5 INJECTION INTRAVENOUS at 09:10

## 2025-02-02 RX ADMIN — HYDRALAZINE HYDROCHLORIDE 10 MG: 20 INJECTION INTRAMUSCULAR; INTRAVENOUS at 20:29

## 2025-02-02 RX ADMIN — SODIUM CHLORIDE 500 MG: 9 INJECTION, SOLUTION INTRAVENOUS at 15:05

## 2025-02-02 RX ADMIN — ATORVASTATIN CALCIUM 40 MG: 40 TABLET, FILM COATED ORAL at 20:28

## 2025-02-02 RX ADMIN — CARBIDOPA AND LEVODOPA 2 TABLET: 25; 100 TABLET ORAL at 09:09

## 2025-02-02 RX ADMIN — GLIPIZIDE 5 MG: 5 TABLET ORAL at 20:28

## 2025-02-02 RX ADMIN — ASPIRIN 81 MG CHEWABLE TABLET 81 MG: 81 TABLET CHEWABLE at 09:08

## 2025-02-02 RX ADMIN — HYDROXYZINE HYDROCHLORIDE 25 MG: 50 INJECTION, SOLUTION INTRAMUSCULAR at 00:53

## 2025-02-02 RX ADMIN — AMLODIPINE BESYLATE 5 MG: 5 TABLET ORAL at 09:09

## 2025-02-02 RX ADMIN — APIXABAN 5 MG: 5 TABLET, FILM COATED ORAL at 20:28

## 2025-02-02 RX ADMIN — RASAGILINE 1 MG: 1 TABLET ORAL at 09:09

## 2025-02-02 RX ADMIN — LORAZEPAM 1 MG: 2 INJECTION INTRAMUSCULAR; INTRAVENOUS at 05:20

## 2025-02-02 RX ADMIN — GABAPENTIN 600 MG: 300 CAPSULE ORAL at 09:09

## 2025-02-02 NOTE — PLAN OF CARE
Problem: Safety - Adult  Goal: Free from fall injury  Outcome: Progressing     Problem: Chronic Conditions and Co-morbidities  Goal: Patient's chronic conditions and co-morbidity symptoms are monitored and maintained or improved  Outcome: Progressing     Problem: Discharge Planning  Goal: Discharge to home or other facility with appropriate resources  Outcome: Progressing     Problem: Skin/Tissue Integrity  Goal: Skin integrity remains intact  Description: 1.  Monitor for areas of redness and/or skin breakdown  2.  Assess vascular access sites hourly  3.  Every 4-6 hours minimum:  Change oxygen saturation probe site  4.  Every 4-6 hours:  If on nasal continuous positive airway pressure, respiratory therapy assess nares and determine need for appliance change or resting period  Outcome: Progressing     Problem: Confusion  Goal: Confusion, delirium, dementia, or psychosis is improved or at baseline  Description: INTERVENTIONS:  1. Assess for possible contributors to thought disturbance, including medications, impaired vision or hearing, underlying metabolic abnormalities, dehydration, psychiatric diagnoses, and notify attending LIP  2. [place] high risk fall precautions, as indicated  3. Provide frequent short contacts to provide reality reorientation, refocusing and direction  4. Decrease environmental stimuli, including noise as appropriate  5. Monitor and intervene to maintain adequate nutrition, hydration, elimination, sleep and activity  6. If unable to ensure safety without constant attention obtain sitter and review sitter guidelines with assigned personnel  7. Initiate Psychosocial CNS and Spiritual Care consult, as indicated  Outcome: Progressing     Problem: Neurosensory - Adult  Goal: Achieves stable or improved neurological status  Outcome: Progressing  Goal: Achieves maximal functionality and self care  Outcome: Progressing  Goal: Absence of seizures  Outcome: Progressing  Goal: Remains free of injury

## 2025-02-02 NOTE — PROGRESS NOTES
[facility]                                Progress note    Subjective:  Comfortable no acute distress  Sleeping soundly    Objective:  Sitting up in bed, appears comfortable  Wife present at the bedside, all questions answered  She states that he has been eating and drinking without issues   BP (!) 140/82   Pulse 62   Temp 98.1 °F (36.7 °C) (Axillary)   Resp 18   Wt 119.2 kg (262 lb 12.8 oz)   SpO2 94%   BMI 35.64 kg/m²   CONST:  Well developed, obese elderly  male who appears stated age. Awake, alert, uncooperative, no apparent distress  HEENT:   Head- Normocephalic, atraumatic   Eyes- Conjunctivae pink, anicteric  Throat- Oral mucosa pink and moist  Neck-  No stridor, trachea midline, no jugular venous distention. No adenopathy   CHEST: Chest symmetrical and non-tender to palpation. No accessory muscle use or intercostal retractions  RESPIRATORY: Lung sounds - clear throughout fields   CARDIOVASCULAR:     No carotid bruit  Heart Inspection- shows no noted pulsations  Heart Palpation- no heaves or thrills; PMI is non-displaced   Heart Ausculation- Regular rate and rhythm, no murmur. No s3, s4 or rub   PV: No lower extremity edema. No varicosities. Pedal pulses palpable, no clubbing or cyanosis   ABDOMEN: Soft, obese, non-tender to light palpation. Bowel sounds present. No palpable masses no organomegaly; no abdominal bruit  MS: Good muscle strength and tone. No atrophy or abnormal movements.   : Deferred  SKIN: Warm and dry no statis dermatitis or ulcers   NEURO / PSYCH: Expressive aphasia noted     Recent Labs     01/31/25  0548 02/01/25  0443 02/02/25 0447   WBC 7.7 7.2 5.9   HGB 13.7 14.3 13.8   HCT 41.8 44.0 42.5    146 145       Recent Labs     01/31/25  0548 02/01/25  0443 02/02/25 0447    142 140   K 4.1 4.2 4.1    107 108*   CO2 24 19* 21*   BUN 22 31* 24*   CREATININE 1.4* 1.5* 1.1   CALCIUM 9.3 9.0 8.9     01/28/2025 MRI Brain WO contrast:  1.  bid  PT/OT  Discharge planning     Merly Reese MD  2/2/2025        LIDIA Bedoya - CNP,  3:39 PM  2/2/2025

## 2025-02-02 NOTE — PROGRESS NOTES
Alexa Calvo NP messaged about request to change pt's code status. Family is currently at bedside but she is not available in person today.

## 2025-02-02 NOTE — PROGRESS NOTES
Perfect serve sent to Mark Cantor regarding pt attempting to get out of bed and pulling at lines. One time dose of vistaril IM ordered.

## 2025-02-03 LAB
ANION GAP SERPL CALCULATED.3IONS-SCNC: 10 MMOL/L (ref 7–16)
BUN SERPL-MCNC: 16 MG/DL (ref 6–23)
CALCIUM SERPL-MCNC: 9.3 MG/DL (ref 8.6–10.2)
CHLORIDE SERPL-SCNC: 110 MMOL/L (ref 98–107)
CO2 SERPL-SCNC: 23 MMOL/L (ref 22–29)
CREAT SERPL-MCNC: 1.1 MG/DL (ref 0.7–1.2)
ERYTHROCYTE [DISTWIDTH] IN BLOOD BY AUTOMATED COUNT: 12.9 % (ref 11.5–15)
GFR, ESTIMATED: 69 ML/MIN/1.73M2
GLUCOSE BLD-MCNC: 110 MG/DL (ref 74–99)
GLUCOSE BLD-MCNC: 125 MG/DL (ref 74–99)
GLUCOSE BLD-MCNC: 139 MG/DL (ref 74–99)
GLUCOSE BLD-MCNC: 72 MG/DL (ref 74–99)
GLUCOSE SERPL-MCNC: 66 MG/DL (ref 74–99)
HCT VFR BLD AUTO: 44.3 % (ref 37–54)
HGB BLD-MCNC: 14.5 G/DL (ref 12.5–16.5)
MCH RBC QN AUTO: 32.4 PG (ref 26–35)
MCHC RBC AUTO-ENTMCNC: 32.7 G/DL (ref 32–34.5)
MCV RBC AUTO: 99.1 FL (ref 80–99.9)
PLATELET, FLUORESCENCE: 139 K/UL (ref 130–450)
PMV BLD AUTO: 10.3 FL (ref 7–12)
POTASSIUM SERPL-SCNC: 4 MMOL/L (ref 3.5–5)
RBC # BLD AUTO: 4.47 M/UL (ref 3.8–5.8)
SODIUM SERPL-SCNC: 143 MMOL/L (ref 132–146)
WBC OTHER # BLD: 6 K/UL (ref 4.5–11.5)

## 2025-02-03 PROCEDURE — 2500000003 HC RX 250 WO HCPCS: Performed by: NURSE PRACTITIONER

## 2025-02-03 PROCEDURE — 2580000003 HC RX 258: Performed by: NURSE PRACTITIONER

## 2025-02-03 PROCEDURE — 6370000000 HC RX 637 (ALT 250 FOR IP): Performed by: NURSE PRACTITIONER

## 2025-02-03 PROCEDURE — 6370000000 HC RX 637 (ALT 250 FOR IP)

## 2025-02-03 PROCEDURE — 99222 1ST HOSP IP/OBS MODERATE 55: CPT

## 2025-02-03 PROCEDURE — 80048 BASIC METABOLIC PNL TOTAL CA: CPT

## 2025-02-03 PROCEDURE — 97530 THERAPEUTIC ACTIVITIES: CPT

## 2025-02-03 PROCEDURE — 82962 GLUCOSE BLOOD TEST: CPT

## 2025-02-03 PROCEDURE — 1200000000 HC SEMI PRIVATE

## 2025-02-03 PROCEDURE — 36415 COLL VENOUS BLD VENIPUNCTURE: CPT

## 2025-02-03 PROCEDURE — 6370000000 HC RX 637 (ALT 250 FOR IP): Performed by: STUDENT IN AN ORGANIZED HEALTH CARE EDUCATION/TRAINING PROGRAM

## 2025-02-03 PROCEDURE — 85027 COMPLETE CBC AUTOMATED: CPT

## 2025-02-03 PROCEDURE — 2500000003 HC RX 250 WO HCPCS

## 2025-02-03 PROCEDURE — 97535 SELF CARE MNGMENT TRAINING: CPT

## 2025-02-03 RX ADMIN — ASPIRIN 81 MG CHEWABLE TABLET 81 MG: 81 TABLET CHEWABLE at 09:30

## 2025-02-03 RX ADMIN — DIVALPROEX SODIUM 500 MG: 125 CAPSULE, COATED PELLETS ORAL at 22:48

## 2025-02-03 RX ADMIN — CARBIDOPA AND LEVODOPA 2 TABLET: 25; 100 TABLET ORAL at 09:30

## 2025-02-03 RX ADMIN — SODIUM CHLORIDE, PRESERVATIVE FREE 10 ML: 5 INJECTION INTRAVENOUS at 20:17

## 2025-02-03 RX ADMIN — APIXABAN 5 MG: 5 TABLET, FILM COATED ORAL at 20:22

## 2025-02-03 RX ADMIN — AMLODIPINE BESYLATE 5 MG: 5 TABLET ORAL at 09:30

## 2025-02-03 RX ADMIN — CARBIDOPA AND LEVODOPA 1 TABLET: 25; 100 TABLET ORAL at 13:07

## 2025-02-03 RX ADMIN — GABAPENTIN 600 MG: 300 CAPSULE ORAL at 09:30

## 2025-02-03 RX ADMIN — DEXTROSE AND SODIUM CHLORIDE: 5; 450 INJECTION, SOLUTION INTRAVENOUS at 20:17

## 2025-02-03 RX ADMIN — ACETAMINOPHEN 650 MG: 325 TABLET ORAL at 09:30

## 2025-02-03 RX ADMIN — Medication 3 MG: at 18:15

## 2025-02-03 RX ADMIN — CARBIDOPA AND LEVODOPA 2 TABLET: 25; 100 TABLET ORAL at 16:23

## 2025-02-03 RX ADMIN — GABAPENTIN 600 MG: 300 CAPSULE ORAL at 20:22

## 2025-02-03 RX ADMIN — POLYETHYLENE GLYCOL 3350 17 G: 17 POWDER, FOR SOLUTION ORAL at 16:37

## 2025-02-03 RX ADMIN — DIVALPROEX SODIUM 500 MG: 125 CAPSULE, COATED PELLETS ORAL at 14:23

## 2025-02-03 RX ADMIN — RASAGILINE 1 MG: 1 TABLET ORAL at 09:31

## 2025-02-03 RX ADMIN — ATORVASTATIN CALCIUM 40 MG: 40 TABLET, FILM COATED ORAL at 20:22

## 2025-02-03 RX ADMIN — CARBIDOPA AND LEVODOPA 1 TABLET: 25; 100 TABLET ORAL at 22:48

## 2025-02-03 RX ADMIN — APIXABAN 5 MG: 5 TABLET, FILM COATED ORAL at 09:30

## 2025-02-03 RX ADMIN — GABAPENTIN 600 MG: 300 CAPSULE ORAL at 14:23

## 2025-02-03 RX ADMIN — GLIPIZIDE 5 MG: 5 TABLET ORAL at 20:22

## 2025-02-03 RX ADMIN — SODIUM CHLORIDE 500 MG: 9 INJECTION, SOLUTION INTRAVENOUS at 07:02

## 2025-02-03 NOTE — CONSULTS
Palliative Care Department  [phone]  Palliative Care Initial Consult  Provider LIDIA Carpenter CNP      PATIENT: Frankie Hernández  : 1945  MRN: [id number]  ADMISSION DATE: 2025  4:16 PM  Referring Provider:  Cait Parada APRN - CNP    Palliative Medicine was consulted on hospital day 7 for assistance with Goals of care    HPI:     Clinical Summary:Frankie Hernández is a 80 y.o. y/o male with a history of diabetes, DVT, Parkinson's, PE, renal carcinoma who presented to [facility] on 2025 with left-sided facial droop, not following commands, slurred speech.  Images revealed acute ischemic left MCA M3 occlusion.  Patient was not a good candidate for TNK, he is on Eliquis.  Dr. Davalos consulted to assist further with goals of care    ASSESSMENT/PLAN:     Pertinent Hospital Diagnoses     Acute ischemic left MCA stroke  History of Parkinson's disease      Palliative Care Encounter / Counseling Regarding Goals of Care  Please see detailed goals of care discussion as below  At this time, Frankie Hernández, Does Not have capacity for medical decision-making.  Capacity is time limited and situation/question specific  During encounter Nina  was surrogate medical decision-maker  Outcome of goals of care meeting:  Goal is to continue to pursue treatment conservatively  At discharge plan is skilled nursing  Family would consider hospice through [facility] hospice if patient condition were to decline further  CODE STATUS discussed and established DNR CC, form completed, copy given to family    Code status DNR-CC  Advanced Directives: HCPOA and living will in soft chart  Surrogate/Legal NOK:  Nina Hernández (Spouse/POA) [phone]  Pamela Musa (Child) [phone]  Farzana Butt (Child) [phone]    Spiritual assessment: no spiritual distress identified  Bereavement and grief: to be determined  Referrals to: none today    Thank you for the opportunity to participate in the care of Frankie Hernández.      LIDIA May CNP  Palliative Medicine     SUBJECTIVE:     Details of Conversation:    Chart reviewed.  Patient seen at the bedside, spouse present in the room, patient was awake, eating his lunch aphasic, with no capacity for medical decision.  Patient daughter Pamela participated over the speaker phone.  Introduced myself and the role of palliative medicine.  Nina has brought in advanced directive, we reviewed living will and HCPOA documents.     Goals of care discussed, they tell me, their father would not have wanted to pursue aggressive treatments, however plan is to pursue conservative approach to his care, their goal is for him to be discharged to skilled nursing, and reevaluate his situation after, potentially returning home or placement.  Prior hospitalization, patient was able to be independent with his needs, able to walk with a walker, and was alert and oriented x 4.  Family are considering placement after skilled nursing.  If patient condition were to decline further, they are not considering escalating the care, but rather would pursued comfort measures under hospice, they already have choice [facility] hospice, when the time is right.  CODE STATUS discussed, and established DNR CC, form completed, and placed in soft chart, copies given to family.  Goals of care and CODE STATUS has been established.  No further PM needs has been identified.  Going to sign off for now.  Please reconsult if new PM needs arises.      Prognosis: Poor and Guarded    OBJECTIVE:     BP (!) 152/78   Pulse 82   Temp 98.2 °F (36.8 °C)   Resp 16   Wt 119.2 kg (262 lb 12.8 oz)   SpO2 95%   BMI 35.64 kg/m²     Physical Examination:  Gen: elderly, awake, obese, alert, aphasic  Lungs: respirations easy   Heart: regular rate   Abdomen:, soft, non-tender  Extremities:  edema  Skin: warm, dry without rashes, lesions, bruising  Neuro: awake    Objective data reviewed: labs, images, records, medication use, vitals, and

## 2025-02-03 NOTE — PROGRESS NOTES
Comprehensive Nutrition Assessment    Type and Reason for Visit:  Initial, LOS    Nutrition Recommendations/Plan:   Continue easy to chew diet or  per SLP recs  Added Magic Cup BID   Added Ensure High Protein BID     Malnutrition Assessment:  Malnutrition Status:  At risk for malnutrition (02/03/25 1205)    Context:  Acute Illness     Findings of the 6 clinical characteristics of malnutrition:  Energy Intake:  50% or less of estimated energy requirements for 5 or more days  Weight Loss:  No weight loss (Weight hx endorses weight gain going since June 2024)     Body Fat Loss:  Unable to assess     Muscle Mass Loss:  Unable to assess    Fluid Accumulation:  Unable to assess     Strength:  Not Performed    Nutrition Assessment:    Pt admitted for stoke. PMH acute lumbar L radiculopathy, YUDY, anxiety depression, cancer, cerebral atrophy, DM, disc placement, hx pulmonary embolus, CVT, CKD, nerve injury, Parkinsonâ€™s disease, pneumonia, renal carcinoma, rhinovirus infection, s/p IVC filter. RD noted SLp consult pending and pt tolerating easy to chew diet; intakes not documented for PO intake during present admission.    Nutrition Related Findings:    -6.9L I&O; AAO x0; GCS 12; + difficulty swallowing; + 1 pitting edema to lower extremities Wound Type: None       Current Nutrition Intake & Therapies:    Average Meal Intake: Unable to assess  Average Supplements Intake: None Ordered  ADULT DIET; Easy to Chew    Anthropometric Measures:  Height: 182 cm (5' 11.65\")  Ideal Body Weight (IBW): 176 lbs (80 kg)    Admission Body Weight: 119.2 kg (262 lb 12.6 oz)  Current Body Weight: 119.2 kg (262 lb 12.6 oz), 149.3 % IBW. Weight Source: Stated  Current BMI (kg/m2): 36  Usual Body Weight:  (UTO 2/2 to documented weight hx)  Weight Adjustment For: No Adjustment  BMI Categories: Obese Class 2 (BMI 35.0 -39.9)    Estimated Daily Nutrient Needs:  Energy Requirements Based On: Formula  Weight Used for Energy Requirements:

## 2025-02-03 NOTE — PROGRESS NOTES
Occupational Therapy  OT BEDSIDE TREATMENT NOTE   [facility]  [address], [place]       Date:2/3/2025  Patient Name: Frankie Hernández  MRN: [id number]  : 1945  Room: [facility]850-     Per OT Eval:    Evaluating OT: Pedro Goodman OTR/L [id number]     Referring Provider: Sofia Healy APRN - CNP                                     Specific Provider Orders/Date: OT evaluation and treatment 25 3334     Diagnosis:  Stroke due to occlusion of left middle cerebral artery (HCC) [I63.512]  Cerebrovascular accident (CVA) due to occlusion of middle cerebral artery, unspecified blood vessel laterality (HCC) [I63.519]       Pertinent Medical History:  has a past medical history of Acute left lumbar radiculopathy, YUDY (acute kidney injury) (HCC), Anxiety and depression, Cancer (HCC), Cerebral atrophy (HCC), Diabetes mellitus (HCC), Disc displacement, lumbar, History of pulmonary embolus (PE), Hx of deep venous thrombosis, Kidney disease, Nerve injury, Parkinson disease (HCC), Parkinson disease (HCC), Pneumonia, Pulmonary embolism (HCC), Renal carcinoma (HCC), Rhinovirus infection, and 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   Past Surgical History         Past Surgical History:   Procedure Laterality Date    BACK SURGERY        CATARACT REMOVAL        ENDOSCOPY, COLON, DIAGNOSTIC        EYE SURGERY        INVASIVE VASCULAR N/A 2024     Vena cava filter insertion - cath lab performed by Rafal Skelton [place] [facility]    INVASIVE VASCULAR N/A 2024     Remove vena cava filter performed by Rafal Skelton MD at [facility]    [facility] FILTER INSERTION   2013     [facility]    IVC FILTER REMOVAL   2014     [facility]    KNEE SURGERY Left      TOTAL NEPHRECTOMY Right 2013     Renal cell carcinoma         Pt presented to the emergency dept on  with L facial droop and headache   MRI brain pending     answer PLOF questions this date. Appears to have difficulty verbalizing. Info below obtained from chart:   Home Living/PLOF:    Home Living:  Pt lives with spouse   in a 1 story house with 1 + 1 steps to enter   Bedroom setup: main level  standard bed   Bathroom setup: main level  walk in shower   Equipment owned: shower chair, cane, w/w, rollator      Per OT eavl 7/30/24 \"Prior Level of Function: assist prn with ADLs , assist prn with IADLs; ambulated with w/w    Driving: no   Occupation: na\"      Pain Level: no s/s of pain     Cognition: A&O: unable to formally assess, poor cognition, mumbled/garbled speech, very soft speaking, only able to understand occasional word or two, pt very sleepy this date, lethargic               Follows single step directions: Poor+ with mod cues simple task               Memory: unable to assess               Sequencing: Poor+              Problem solving: Poor              Judgement/safety: Poor               Attention: Fair-      Functional Assessment: AM-PAC Inpatient Daily Activity Raw Score: 9/24       Initial Eval Status  Date: 1/28/2025   Treatment Status  Date:  2/3/25 STG=LTG  Time frame: 10-14 days   Feeding Minimal assistance  Items on lunch tray  Max A  Too lethargic, recommending wife wait to feed him anymore yogurt until he is more alert  Mod I    Grooming Minimal assistance    Max A  Attempted [place] assist with poor results this date due to level of alertness while seated at EOB Set up   UB Dressing Minimal assistance  Gown seated EOB  Max A  Donning gown bed level, max cues for alertness  Set up   LB Dressing Maximum assistance  To maxim brief   Dep  This date due to lethargy   Maxim/doff brief    Mod A prior session   Min A    Bathing Maximum assistance  simulated  Max A  Simulated  Min A    Toileting Dependent/Total  Incontinent  Dep  Using ext male lopez, incontinent of urine once disconnected from ext lopez Min A    Bed Mobility  Rolling L/R: NT   Supine to sit: Min A   bed alarm set, all lines and tubes intact, call light within reach.     Pt has made poor progress towards set goals.   Continue with current plan of care    Treatment Time In:810           Treatment Time Out:840          Treatment Charges: Mins Units   Ther Ex  11848     Manual Therapy 43701     Thera Activities 19614 15 1   ADL/Home Mgt 03133 15 1   Neuro Re-ed 39833     Group Therapy      Orthotic manage/training  [id number]     Non-Billable Time     Total Timed Treatment 30 2       Katherine Dailey, ATKINSON/L [id number]

## 2025-02-03 NOTE — PLAN OF CARE
Problem: Safety - Adult  Goal: Free from fall injury  Outcome: Progressing     Problem: Chronic Conditions and Co-morbidities  Goal: Patient's chronic conditions and co-morbidity symptoms are monitored and maintained or improved  Outcome: Progressing     Problem: Discharge Planning  Goal: Discharge to home or other facility with appropriate resources  Outcome: Progressing     Problem: Skin/Tissue Integrity  Goal: Skin integrity remains intact  Description: 1.  Monitor for areas of redness and/or skin breakdown  2.  Assess vascular access sites hourly  3.  Every 4-6 hours minimum:  Change oxygen saturation probe site  4.  Every 4-6 hours:  If on nasal continuous positive airway pressure, respiratory therapy assess nares and determine need for appliance change or resting period  Outcome: Progressing     Problem: Confusion  Goal: Confusion, delirium, dementia, or psychosis is improved or at baseline  Description: INTERVENTIONS:  1. Assess for possible contributors to thought disturbance, including medications, impaired vision or hearing, underlying metabolic abnormalities, dehydration, psychiatric diagnoses, and notify attending LIP  2. [place] high risk fall precautions, as indicated  3. Provide frequent short contacts to provide reality reorientation, refocusing and direction  4. Decrease environmental stimuli, including noise as appropriate  5. Monitor and intervene to maintain adequate nutrition, hydration, elimination, sleep and activity  6. If unable to ensure safety without constant attention obtain sitter and review sitter guidelines with assigned personnel  7. Initiate Psychosocial CNS and Spiritual Care consult, as indicated  Outcome: Progressing     Problem: Neurosensory - Adult  Goal: Achieves stable or improved neurological status  Outcome: Progressing  Goal: Achieves maximal functionality and self care  Outcome: Progressing  Goal: Absence of seizures  Outcome: Progressing  Goal: Remains free of injury  related to seizures activity  Outcome: Progressing     Problem: Musculoskeletal - Adult  Goal: Return mobility to safest level of function  Outcome: Progressing  Goal: Return ADL status to a safe level of function  Outcome: Progressing     Problem: Pain  Goal: Verbalizes/displays adequate comfort level or baseline comfort level  Outcome: Progressing

## 2025-02-03 NOTE — PROGRESS NOTES
[facility]                                Progress note    Subjective:  Comfortable no acute distress  Awake    Objective:  Sitting up in bed, appears comfortable  Wife present at the bedside, all questions answered  Wife states that he ambulated with heavy assist with PT recently   /64   Pulse 89   Temp 98.2 °F (36.8 °C)   Resp 16   Ht 1.82 m (5' 11.65\")   Wt 119.2 kg (262 lb 12.8 oz)   SpO2 91%   BMI 35.99 kg/m²   CONST:  Well developed, obese elderly  male who appears stated age. Awake, alert, uncooperative, no apparent distress  HEENT:   Head- Normocephalic, atraumatic   Eyes- Conjunctivae pink, anicteric  Throat- Oral mucosa pink and moist  Neck-  No stridor, trachea midline, no jugular venous distention. No adenopathy   CHEST: Chest symmetrical and non-tender to palpation. No accessory muscle use or intercostal retractions  RESPIRATORY: Lung sounds - clear throughout fields   CARDIOVASCULAR:     No carotid bruit  Heart Inspection- shows no noted pulsations  Heart Palpation- no heaves or thrills; PMI is non-displaced   Heart Ausculation- Regular rate and rhythm, no murmur. No s3, s4 or rub   PV: No lower extremity edema. No varicosities. Pedal pulses palpable, no clubbing or cyanosis   ABDOMEN: Soft, obese, non-tender to light palpation. Bowel sounds present. No palpable masses no organomegaly; no abdominal bruit  MS: Good muscle strength and tone. No atrophy or abnormal movements.   : Deferred  SKIN: Warm and dry no statis dermatitis or ulcers   NEURO / PSYCH: Expressive aphasia noted     Recent Labs     02/01/25  0443 02/02/25  0447 02/03/25  0551   WBC 7.2 5.9 6.0   HGB 14.3 13.8 14.5   HCT 44.0 42.5 44.3    145  --        Recent Labs     02/01/25  0443 02/02/25  0447 02/03/25  0551    140 143   K 4.2 4.1 4.0    108* 110*   CO2 19* 21* 23   BUN 31* 24* 16   CREATININE 1.5* 1.1 1.1   CALCIUM 9.0 8.9 9.3     01/28/2025 MRI Brain WO contrast:  1.  oral anticoagulation with Eliquis as well as aspirin  Okay for discharge if pre-CERT comes through-medically stable  Discussed with family members at bedside    02/01/2025  Large acute anterior and mid left MCA inferior division infarct per MRI of the brain with residual mixed aphasia  CT of the head 01/30 with increased edema consistent with subacute infarction  Continue Eliquis and ASA per Neurology  HTN: Controlled  Goal BP <140/90  Resume Norvasc with holding parameters as he is now tolerating a diet  Continue as needed Hydralazine  Continue statin  Goal LDL <70  T2DM with goal HA1C <7%  Continue SSI   Prolonged QTc  Avoid prolonging QT agents  PT OT  YUDY with BUN/SCr 14/1.3 (01/30)-->31/1.5(02/01)  Dark urine, with minimal PO intake per wife  Start IV fluids  Monitor BMP, CBC  TTE with EF 55-60%, severe concentric hypertrophy, bubble study negative,   PT OT  AM PAC 11/24  Discharge planning, awaiting precert    02/02/2025  Large acute anterior and mid left MCA inferior division infarct per MRI of the brain with residual mixed aphasia  CT of the head 01/30 with increased edema consistent with subacute infarction  Continue Eliquis and ASA per Neurology  HTN: Controlled  Goal BP <140/90  Continue as needed Hydralazine  Continue statin  Goal LDL <70  T2DM with goal HA1C <7%  Continue SSI   Prolonged QTc  Avoid prolonging QT agents  PT OT  YUDY resolved with IV fluid resuscitation, with BUN/SCr 14/1.3 (01/30)-->31/1.5(02/01)--> 24/1.1 (02/02)  Continue IV fluids as he remains with poor PO intake  Monitor BMP, CBC  TTE with EF 55-60%, severe concentric hypertrophy, bubble study negative,   PT OT  AM PAC 11/24  Discharge planning, awaiting precert  Palliative care consult per patient's wife request  Long discussion at the bedside with patient's wife regarding current status of patient, all questions answered    02/03/2025  Large acute anterior and mid left MCA inferior division infarct per MRI of the brain with

## 2025-02-03 NOTE — PROGRESS NOTES
Physical Therapy  Physical Therapy treatment note       Name: Frankie Hernández  : 1945  MRN: [id number]      Date of Service: 2/3/2025    Evaluating PT:  Asha Luis Enriquesachin PT, DPT [id number]    Room #:  8501/8501-B  Diagnosis:  Stroke due to occlusion of left middle cerebral artery (HCC) [I63.512]  Cerebrovascular accident (CVA) due to occlusion of middle cerebral artery, unspecified blood vessel laterality (HCC) [I63.519]  PMHx/PSHx:   has a past medical history of Acute left lumbar radiculopathy, YUDY (acute kidney injury) ([facility]), Anxiety and depression, Cerebral atrophy (HCC), Disc displacement, lumbar, Hx of deep venous thrombosis, Kidney disease, Parkinson disease ([facility]), Pneumonia, Pulmonary embolism ([facility]), Renal carcinoma ([facility]), Rhinovirus infection, 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, and Type 2 diabetes mellitus ([facility]).    Procedure/Surgery:  none this admission  Precautions: Falls,  speech, Hx parkinson's disease, +alarms, TSM, cognition, R inattention     SUBJECTIVE:    Per chart, Pt lives with his wife in a 1 story home with 1+1 stairs to enter and ? rail(s).  Bed is on 1st floor and bath is on 1st floor.  Pt ambulated with ? PTA. Pt appears to be aphasic, unable to contribute to subjective exam.     Equipment Owned: rollator, WW, cane  Equipment Needs:  TBD    OBJECTIVE:   Initial Evaluation  Date: 25 Treatment  Date: 2/3/25 Short Term/ Long Term   Goals   AM-PAC 6 Clicks 15/24 - pt would benefit from intensive rehabilitation program       Was pt agreeable to Eval/treatment? Yes  Yes     Does pt have pain? No complaints  No complaints     Bed Mobility  Rolling: Perry  Supine to sit: Perry  Sit to supine: NT  Scooting: [place] Rolling: ModA  Supine to sit: ModA x2  Sit to supine: ModA x2  Scooting: ModA to EOB Rolling: Independent  Supine to sit: Independent  Sit to supine: Independent  Scooting: Independent   Transfers Sit to stand: Perry  Stand to sit: Perry  Stand pivot: ModA no AD Sit to

## 2025-02-03 NOTE — PROGRESS NOTES
Neurology plan of care note  Patient presented to the ER on 1/27/2025 for facial droop, difficulty speaking and headache.  He is found to have acute ischemic stroke left MCA left M3 occlusion he has several significant risk factors for stroke and cerebrovascular disease      Patient maintained on aspirin and Eliquis    Was notified by family as needed Ativan is making patient extremely tired and wants this medication held.  Patient is getting his days and nights confused.    Patient is now a DNR CC    Plan  Start melatonin at 6 PM  Continue Depakote 500 mg 3 times daily  Neurology will sign off call if needed

## 2025-02-04 VITALS
TEMPERATURE: 97.6 F | SYSTOLIC BLOOD PRESSURE: 143 MMHG | BODY MASS INDEX: 35.59 KG/M2 | DIASTOLIC BLOOD PRESSURE: 75 MMHG | RESPIRATION RATE: 17 BRPM | HEIGHT: 72 IN | OXYGEN SATURATION: 94 % | HEART RATE: 91 BPM | WEIGHT: 262.8 LBS

## 2025-02-04 LAB
ANION GAP SERPL CALCULATED.3IONS-SCNC: 9 MMOL/L (ref 7–16)
BUN SERPL-MCNC: 15 MG/DL (ref 6–23)
CALCIUM SERPL-MCNC: 9.3 MG/DL (ref 8.6–10.2)
CHLORIDE SERPL-SCNC: 106 MMOL/L (ref 98–107)
CO2 SERPL-SCNC: 24 MMOL/L (ref 22–29)
CREAT SERPL-MCNC: 1.1 MG/DL (ref 0.7–1.2)
EKG ATRIAL RATE: 84 BPM
EKG P AXIS: 48 DEGREES
EKG P-R INTERVAL: 186 MS
EKG Q-T INTERVAL: 424 MS
EKG QRS DURATION: 142 MS
EKG QTC CALCULATION (BAZETT): 501 MS
EKG R AXIS: -33 DEGREES
EKG T AXIS: 3 DEGREES
EKG VENTRICULAR RATE: 84 BPM
ERYTHROCYTE [DISTWIDTH] IN BLOOD BY AUTOMATED COUNT: 12.9 % (ref 11.5–15)
GFR, ESTIMATED: 66 ML/MIN/1.73M2
GLUCOSE BLD-MCNC: 120 MG/DL (ref 74–99)
GLUCOSE BLD-MCNC: 121 MG/DL (ref 74–99)
GLUCOSE BLD-MCNC: 66 MG/DL (ref 74–99)
GLUCOSE SERPL-MCNC: 59 MG/DL (ref 74–99)
HCT VFR BLD AUTO: 42.4 % (ref 37–54)
HGB BLD-MCNC: 13.9 G/DL (ref 12.5–16.5)
MCH RBC QN AUTO: 32.3 PG (ref 26–35)
MCHC RBC AUTO-ENTMCNC: 32.8 G/DL (ref 32–34.5)
MCV RBC AUTO: 98.4 FL (ref 80–99.9)
PLATELET # BLD AUTO: 142 K/UL (ref 130–450)
PMV BLD AUTO: 10.1 FL (ref 7–12)
POTASSIUM SERPL-SCNC: 3.9 MMOL/L (ref 3.5–5)
RBC # BLD AUTO: 4.31 M/UL (ref 3.8–5.8)
SODIUM SERPL-SCNC: 139 MMOL/L (ref 132–146)
WBC OTHER # BLD: 6.5 K/UL (ref 4.5–11.5)

## 2025-02-04 PROCEDURE — 6370000000 HC RX 637 (ALT 250 FOR IP): Performed by: NURSE PRACTITIONER

## 2025-02-04 PROCEDURE — 6370000000 HC RX 637 (ALT 250 FOR IP)

## 2025-02-04 PROCEDURE — 82962 GLUCOSE BLOOD TEST: CPT

## 2025-02-04 PROCEDURE — 36415 COLL VENOUS BLD VENIPUNCTURE: CPT

## 2025-02-04 PROCEDURE — 85027 COMPLETE CBC AUTOMATED: CPT

## 2025-02-04 PROCEDURE — 80048 BASIC METABOLIC PNL TOTAL CA: CPT

## 2025-02-04 RX ORDER — ASPIRIN 81 MG/1
81 TABLET, CHEWABLE ORAL DAILY
Qty: 30 TABLET | Refills: 3 | Status: ON HOLD | OUTPATIENT
Start: 2025-02-05 | End: 2025-02-20 | Stop reason: HOSPADM

## 2025-02-04 RX ORDER — ACETAMINOPHEN 325 MG/1
650 TABLET ORAL EVERY 4 HOURS PRN
Status: DISCONTINUED | OUTPATIENT
Start: 2025-02-04 | End: 2025-02-04 | Stop reason: HOSPADM

## 2025-02-04 RX ORDER — ATORVASTATIN CALCIUM 40 MG/1
40 TABLET, FILM COATED ORAL DAILY
Qty: 30 TABLET | Refills: 0 | Status: ON HOLD | OUTPATIENT
Start: 2025-02-04 | End: 2025-02-20 | Stop reason: HOSPADM

## 2025-02-04 RX ORDER — DIVALPROEX SODIUM 125 MG/1
500 CAPSULE, COATED PELLETS ORAL EVERY 8 HOURS SCHEDULED
Qty: 60 CAPSULE | Refills: 3 | Status: ON HOLD | OUTPATIENT
Start: 2025-02-04 | End: 2025-02-20 | Stop reason: HOSPADM

## 2025-02-04 RX ADMIN — CARBIDOPA AND LEVODOPA 1 TABLET: 25; 100 TABLET ORAL at 11:20

## 2025-02-04 RX ADMIN — APIXABAN 5 MG: 5 TABLET, FILM COATED ORAL at 09:51

## 2025-02-04 RX ADMIN — RASAGILINE 1 MG: 1 TABLET ORAL at 09:52

## 2025-02-04 RX ADMIN — CARBIDOPA AND LEVODOPA 2 TABLET: 25; 100 TABLET ORAL at 09:51

## 2025-02-04 RX ADMIN — ACETAMINOPHEN 650 MG: 325 TABLET ORAL at 11:19

## 2025-02-04 RX ADMIN — GABAPENTIN 600 MG: 300 CAPSULE ORAL at 09:50

## 2025-02-04 RX ADMIN — ASPIRIN 81 MG CHEWABLE TABLET 81 MG: 81 TABLET CHEWABLE at 09:51

## 2025-02-04 RX ADMIN — DIVALPROEX SODIUM 500 MG: 125 CAPSULE, COATED PELLETS ORAL at 05:42

## 2025-02-04 RX ADMIN — AMLODIPINE BESYLATE 5 MG: 5 TABLET ORAL at 09:51

## 2025-02-04 ASSESSMENT — PAIN SCALES - GENERAL
PAINLEVEL_OUTOF10: 5
PAINLEVEL_OUTOF10: 7

## 2025-02-04 ASSESSMENT — PAIN DESCRIPTION - LOCATION: LOCATION: HEAD

## 2025-02-04 ASSESSMENT — PAIN DESCRIPTION - DESCRIPTORS: DESCRIPTORS: ACHING;DULL;PRESSURE

## 2025-02-04 ASSESSMENT — PAIN SCALES - WONG BAKER: WONGBAKER_NUMERICALRESPONSE: NO HURT

## 2025-02-04 ASSESSMENT — PAIN - FUNCTIONAL ASSESSMENT: PAIN_FUNCTIONAL_ASSESSMENT: PREVENTS OR INTERFERES SOME ACTIVE ACTIVITIES AND ADLS

## 2025-02-04 ASSESSMENT — PAIN DESCRIPTION - ORIENTATION: ORIENTATION: MID;ANTERIOR

## 2025-02-04 NOTE — PROGRESS NOTES
Patient removed IV. He is pulling at his lines. I went to start a new IV and his wife insisted that a new IV doesn't get placed. Educated the patient and family about the need for a IV site.

## 2025-02-04 NOTE — CARE COORDINATION
2/4/2025social work transition of care planning  Pt plan is to [facility],once auth obtained. Per previous sw, hens paperwork completed.  Electronically signed by LAURIE Mckeon on 2/4/2025 at 8:41 AM    Addendum: Brandin notified that auth obtained. Brandin set up [facility] ambulance for 2 pm to [facility]. Brandin notified family at bedside,charge rn, and facility rep.  Electronically signed by LAURIE Mckeon on 2/4/2025 at 10:35 AM

## 2025-02-04 NOTE — PLAN OF CARE
Problem: Safety - Adult  Goal: Free from fall injury  2/4/2025 1211 by Gill Holman RN  Outcome: Progressing  2/3/2025 2324 by Hemalatha Frazier RN  Outcome: Progressing     Problem: Chronic Conditions and Co-morbidities  Goal: Patient's chronic conditions and co-morbidity symptoms are monitored and maintained or improved  2/4/2025 1211 by Gill Holman RN  Outcome: Progressing  2/3/2025 2324 by Hemalatha Frazier RN  Outcome: Progressing     Problem: Discharge Planning  Goal: Discharge to home or other facility with appropriate resources  2/4/2025 1211 by Gill Holman RN  Outcome: Progressing  2/3/2025 2324 by Hemalatha Frazier RN  Outcome: Progressing     Problem: Skin/Tissue Integrity  Goal: Skin integrity remains intact  Description: 1.  Monitor for areas of redness and/or skin breakdown  2.  Assess vascular access sites hourly  3.  Every 4-6 hours minimum:  Change oxygen saturation probe site  4.  Every 4-6 hours:  If on nasal continuous positive airway pressure, respiratory therapy assess nares and determine need for appliance change or resting period  Outcome: Progressing     Problem: Confusion  Goal: Confusion, delirium, dementia, or psychosis is improved or at baseline  Description: INTERVENTIONS:  1. Assess for possible contributors to thought disturbance, including medications, impaired vision or hearing, underlying metabolic abnormalities, dehydration, psychiatric diagnoses, and notify attending LIP  2. [place] high risk fall precautions, as indicated  3. Provide frequent short contacts to provide reality reorientation, refocusing and direction  4. Decrease environmental stimuli, including noise as appropriate  5. Monitor and intervene to maintain adequate nutrition, hydration, elimination, sleep and activity  6. If unable to ensure safety without constant attention obtain sitter and review sitter guidelines with assigned personnel  7. Initiate Psychosocial CNS and Spiritual Care consult,  as indicated  Outcome: Progressing     Problem: Neurosensory - Adult  Goal: Achieves stable or improved neurological status  Outcome: Progressing  Goal: Achieves maximal functionality and self care  Outcome: Progressing  Goal: Absence of seizures  Outcome: Progressing  Goal: Remains free of injury related to seizures activity  Outcome: Progressing     Problem: Musculoskeletal - Adult  Goal: Return mobility to safest level of function  Outcome: Progressing  Goal: Return ADL status to a safe level of function  Outcome: Progressing     Problem: Pain  Goal: Verbalizes/displays adequate comfort level or baseline comfort level  Outcome: Progressing     Problem: Nutrition Deficit:  Goal: Optimize nutritional status  Outcome: Progressing

## 2025-02-04 NOTE — DISCHARGE SUMMARY
[facility]   Discharge summary   Patient ID:  Frankie Hernández  [id number]  80 y.o.  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    Acute left lumbar radiculopathy    Cerebral atrophy (HCC)    Unable to ambulate    Vertigo    Acute hypoxic respiratory failure    Acute respiratory failure with hypoxia    CAP (community acquired pneumonia)    Respiratory failure    Syncope and collapse    Acute ischemic left MCA stroke (HCC)    Stroke due to occlusion of left middle cerebral artery (HCC)       Discharge Diagnoses:   Patient Active Problem List   Diagnosis    DVT (deep venous thrombosis) (HCC)    Pulmonary embolism (HCC)    Renal carcinoma (HCC)    Anxiety and depression    Parkinson disease (HCC)    CKD (chronic kidney disease) stage 3, GFR 30-59 ml/min    YUDY (acute kidney injury) (HCC)    Hx of deep venous thrombosis    Dyspnea    History of pulmonary embolus (PE)    Rhinovirus infection    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 male of  pharynx are unremarkable.  No acute abnormality of the salivary and thyroid glands. BONES: No acute osseous abnormality. CTA HEAD: ANTERIOR CIRCULATION: There is occlusion of a distal M2 branch of the anterior inferior left MCA best seen on axial MIP image 46 series 607 and coronal image 99 series 603.  This corresponds to the perfusion abnormality described below. No significant stenosis of the intracranial internal carotid, anterior cerebral, or middle cerebral arteries. No aneurysm.  The anterior communicating artery is patent. POSTERIOR CIRCULATION: No significant stenosis of the vertebral, basilar, or posterior cerebral arteries. No aneurysm.  The right vertebral artery is dominant.  The left vertebral artery is much smaller in caliber.  I cannot identify either of the posterior communicating arteries, a variant of normal. OTHER: No dural venous sinus thrombosis on this non-dedicated study. CT PERFUSION: EXAM QUALITY: The examination is diagnostic with appropriate arterial inflow and venous outflow curves, and diagnostic perfusion maps. CORE INFARCT: The total area of ischemic core is 4 mL (CBF<30% volume) located in the anterior left temporal lobe. TOTAL HYPOPERFUSION: The total area of hypoperfusion is 50 mL (Tmax>6s volume). PENUMBRA: The penumbra (mismatch) volume is 46 mL and is located throughout the left temporal and inferior parietal lobe, in the left inferior MCA territory.  The mismatch ratio is 12.5 . As cast these findings with Dr. Wilder at 4:53 p.m. on 01/27/2025.     1. CTA of the head shows occlusion of a distal M2 branch of the anterior inferior left MCA. 2. CT perfusion shows 4 mL of ischemic core in the anterior left temporal lobe. 3. 46 mL of penumbra in the left temporal and inferior parietal lobes, in the left inferior MCA territory. 4. CTA of the neck shows 50% stenosis of the origin of the dominant right vertebral artery. 5. No stenosis of the cervical carotid arteries using NASCET

## 2025-02-10 NOTE — PROGRESS NOTES
Physician Progress Note      PATIENT:               CLEVE SALAZAR  [facility] #:                  [id number]  :                       1945  ADMIT DATE:       2025 4:16 PM  DISCH DATE:        2025 2:39 PM  RESPONDING  PROVIDER #:        Merly Reese MD          QUERY TEXT:    Patient admitted with Acute ischemic left MCA stroke.  Noted documentation of   Acute Kidney Injury in H&P 2025.  In order to support the diagnosis of   YUDY, please include additional clinical indicators in your documentation.? Or   please document if the diagnosis of YUDY has been ruled out after further   study.  The medical record reflects the following:  Risk Factors: Age 80-year male  Clinical Indicators: Progress Notes 2025- YUDY with BUN/SCr 14/1.3    Progress Notes 2025-YUDY resolved with IV fluid resuscitation, with   BUN/SCr 14/1.3 ()-->31/1.5()--> 24/1.1 ()  Continue IV fluids as he remains with poor PO intake    Creatinine on - 1.4, GFR- 52, BUN- 19,  Creatinine on - 1.3, GFR-55, BUN- 14  Creatinine on - 1.3, GFR-55, BUN- 14  Creatinine on - 1.3, GFR- 55, BUN- 15  Creatinine on - 1.4, GFR- 50, BUN- 22  Creatinine on - 1.5, GFR- 47, BUN- 31  Creatinine on - 1.1, GFR- 65, BUN- 24    Treatment: IVF, SERIAL LAB      Thank you  Hannah Luu CDS [facility]    Defined by Kidney Disease Improving Global Outcomes (KDIGO) clinical practice   guideline for acute kidney injury:  -Increase in SCr by greater than or equal to 0.3 mg/dl within 48 hours; or  -Increase or decrease in SCr to greater than or equal to 1.5 times baseline,   which is known or presumed to have occurred within the prior 7 days; or  -Urine volume < 0.5ml/kg/h for 6 hours.  Options provided:  -- Acute kidney injury evidenced by, Please document evidence as well as a   numerical baseline creatinine, if known.  -- CKD stage 3 Without Acute kidney injury  -- Other - I will add my own diagnosis  -- Disagree -

## 2025-02-16 ENCOUNTER — APPOINTMENT (OUTPATIENT)
Dept: GENERAL RADIOLOGY | Age: 80
DRG: 521 | End: 2025-02-16
Payer: MEDICARE

## 2025-02-16 ENCOUNTER — HOSPITAL ENCOUNTER (INPATIENT)
Age: 80
LOS: 4 days | Discharge: NURSING, REHAB OR LONG-TERM CARE | DRG: 521 | End: 2025-02-20
Attending: EMERGENCY MEDICINE | Admitting: INTERNAL MEDICINE
Payer: MEDICARE

## 2025-02-16 ENCOUNTER — APPOINTMENT (OUTPATIENT)
Dept: CT IMAGING | Age: 80
DRG: 521 | End: 2025-02-16
Payer: MEDICARE

## 2025-02-16 DIAGNOSIS — Z86.73 HISTORY OF CVA (CEREBROVASCULAR ACCIDENT): ICD-10-CM

## 2025-02-16 DIAGNOSIS — S20.219A HEMATOMA OF CHEST WALL, UNSPECIFIED LATERALITY, INITIAL ENCOUNTER: ICD-10-CM

## 2025-02-16 DIAGNOSIS — S72.002A CLOSED FRACTURE OF LEFT HIP, INITIAL ENCOUNTER (HCC): Primary | ICD-10-CM

## 2025-02-16 DIAGNOSIS — Z86.73 CHRONIC ISCHEMIC LEFT MCA STROKE: ICD-10-CM

## 2025-02-16 DIAGNOSIS — I82.90 DEEP VEIN THROMBOSIS (DVT) OF NON-EXTREMITY VEIN, UNSPECIFIED CHRONICITY: ICD-10-CM

## 2025-02-16 DIAGNOSIS — S72.002A CLOSED LEFT HIP FRACTURE, INITIAL ENCOUNTER (HCC): ICD-10-CM

## 2025-02-16 LAB
ALBUMIN SERPL-MCNC: 3.5 G/DL (ref 3.5–5.2)
ALP SERPL-CCNC: 92 U/L (ref 40–129)
ALT SERPL-CCNC: 7 U/L (ref 0–40)
ANION GAP SERPL CALCULATED.3IONS-SCNC: 12 MMOL/L (ref 7–16)
AST SERPL-CCNC: 27 U/L (ref 0–39)
BASOPHILS # BLD: 0.04 K/UL (ref 0–0.2)
BASOPHILS NFR BLD: 1 % (ref 0–2)
BILIRUB SERPL-MCNC: 0.7 MG/DL (ref 0–1.2)
BUN SERPL-MCNC: 25 MG/DL (ref 6–23)
CALCIUM SERPL-MCNC: 9.4 MG/DL (ref 8.6–10.2)
CHLORIDE SERPL-SCNC: 102 MMOL/L (ref 98–107)
CO2 SERPL-SCNC: 25 MMOL/L (ref 22–29)
CREAT SERPL-MCNC: 1.4 MG/DL (ref 0.7–1.2)
EOSINOPHIL # BLD: 0.17 K/UL (ref 0.05–0.5)
EOSINOPHILS RELATIVE PERCENT: 2 % (ref 0–6)
ERYTHROCYTE [DISTWIDTH] IN BLOOD BY AUTOMATED COUNT: 12.6 % (ref 11.5–15)
GFR, ESTIMATED: 50 ML/MIN/1.73M2
GLUCOSE BLD-MCNC: 122 MG/DL (ref 74–99)
GLUCOSE BLD-MCNC: 135 MG/DL (ref 74–99)
GLUCOSE BLD-MCNC: 137 MG/DL (ref 74–99)
GLUCOSE BLD-MCNC: 156 MG/DL (ref 74–99)
GLUCOSE SERPL-MCNC: 134 MG/DL (ref 74–99)
HCT VFR BLD AUTO: 40.3 % (ref 37–54)
HGB BLD-MCNC: 12.8 G/DL (ref 12.5–16.5)
IMM GRANULOCYTES # BLD AUTO: 0.11 K/UL (ref 0–0.58)
IMM GRANULOCYTES NFR BLD: 1 % (ref 0–5)
INR PPP: 1.2
LYMPHOCYTES NFR BLD: 0.78 K/UL (ref 1.5–4)
LYMPHOCYTES RELATIVE PERCENT: 10 % (ref 20–42)
MCH RBC QN AUTO: 31.5 PG (ref 26–35)
MCHC RBC AUTO-ENTMCNC: 31.8 G/DL (ref 32–34.5)
MCV RBC AUTO: 99.3 FL (ref 80–99.9)
MONOCYTES NFR BLD: 0.87 K/UL (ref 0.1–0.95)
MONOCYTES NFR BLD: 11 % (ref 2–12)
NEUTROPHILS NFR BLD: 76 % (ref 43–80)
NEUTS SEG NFR BLD: 6.16 K/UL (ref 1.8–7.3)
PLATELET # BLD AUTO: 141 K/UL (ref 130–450)
PMV BLD AUTO: 10.5 FL (ref 7–12)
POTASSIUM SERPL-SCNC: 4.7 MMOL/L (ref 3.5–5)
PROT SERPL-MCNC: 6.5 G/DL (ref 6.4–8.3)
PROTHROMBIN TIME: 13.1 SEC (ref 9.3–12.4)
RBC # BLD AUTO: 4.06 M/UL (ref 3.8–5.8)
SODIUM SERPL-SCNC: 139 MMOL/L (ref 132–146)
WBC OTHER # BLD: 8.1 K/UL (ref 4.5–11.5)

## 2025-02-16 PROCEDURE — 6370000000 HC RX 637 (ALT 250 FOR IP): Performed by: INTERNAL MEDICINE

## 2025-02-16 PROCEDURE — 85610 PROTHROMBIN TIME: CPT

## 2025-02-16 PROCEDURE — 73552 X-RAY EXAM OF FEMUR 2/>: CPT

## 2025-02-16 PROCEDURE — 2500000003 HC RX 250 WO HCPCS: Performed by: NURSE PRACTITIONER

## 2025-02-16 PROCEDURE — 87081 CULTURE SCREEN ONLY: CPT

## 2025-02-16 PROCEDURE — 72125 CT NECK SPINE W/O DYE: CPT

## 2025-02-16 PROCEDURE — 1200000000 HC SEMI PRIVATE

## 2025-02-16 PROCEDURE — 73560 X-RAY EXAM OF KNEE 1 OR 2: CPT

## 2025-02-16 PROCEDURE — 6360000002 HC RX W HCPCS

## 2025-02-16 PROCEDURE — 6360000002 HC RX W HCPCS: Performed by: INTERNAL MEDICINE

## 2025-02-16 PROCEDURE — 85025 COMPLETE CBC W/AUTO DIFF WBC: CPT

## 2025-02-16 PROCEDURE — 6370000000 HC RX 637 (ALT 250 FOR IP): Performed by: NURSE PRACTITIONER

## 2025-02-16 PROCEDURE — 70450 CT HEAD/BRAIN W/O DYE: CPT

## 2025-02-16 PROCEDURE — 80053 COMPREHEN METABOLIC PANEL: CPT

## 2025-02-16 PROCEDURE — 73502 X-RAY EXAM HIP UNI 2-3 VIEWS: CPT

## 2025-02-16 PROCEDURE — 2060000000 HC ICU INTERMEDIATE R&B

## 2025-02-16 PROCEDURE — 96374 THER/PROPH/DIAG INJ IV PUSH: CPT

## 2025-02-16 PROCEDURE — 82962 GLUCOSE BLOOD TEST: CPT

## 2025-02-16 PROCEDURE — 99285 EMERGENCY DEPT VISIT HI MDM: CPT

## 2025-02-16 PROCEDURE — 71250 CT THORAX DX C-: CPT

## 2025-02-16 RX ORDER — INSULIN LISPRO 100 [IU]/ML
0-8 INJECTION, SOLUTION INTRAVENOUS; SUBCUTANEOUS
Status: DISCONTINUED | OUTPATIENT
Start: 2025-02-16 | End: 2025-02-20

## 2025-02-16 RX ORDER — CARBIDOPA AND LEVODOPA 25; 100 MG/1; MG/1
2 TABLET ORAL 2 TIMES DAILY
Status: DISCONTINUED | OUTPATIENT
Start: 2025-02-16 | End: 2025-02-20 | Stop reason: HOSPADM

## 2025-02-16 RX ORDER — ACETAMINOPHEN 325 MG/1
650 TABLET ORAL EVERY 6 HOURS PRN
Status: DISCONTINUED | OUTPATIENT
Start: 2025-02-16 | End: 2025-02-20 | Stop reason: HOSPADM

## 2025-02-16 RX ORDER — ACETAMINOPHEN 650 MG/1
650 SUPPOSITORY RECTAL EVERY 6 HOURS PRN
Status: DISCONTINUED | OUTPATIENT
Start: 2025-02-16 | End: 2025-02-20 | Stop reason: HOSPADM

## 2025-02-16 RX ORDER — CARBIDOPA AND LEVODOPA 25; 100 MG/1; MG/1
1 TABLET ORAL 2 TIMES DAILY
Status: DISCONTINUED | OUTPATIENT
Start: 2025-02-16 | End: 2025-02-20 | Stop reason: HOSPADM

## 2025-02-16 RX ORDER — DIVALPROEX SODIUM 125 MG/1
500 CAPSULE, COATED PELLETS ORAL EVERY 8 HOURS SCHEDULED
Status: DISCONTINUED | OUTPATIENT
Start: 2025-02-16 | End: 2025-02-20 | Stop reason: HOSPADM

## 2025-02-16 RX ORDER — SODIUM CHLORIDE 0.9 % (FLUSH) 0.9 %
10 SYRINGE (ML) INJECTION PRN
Status: DISCONTINUED | OUTPATIENT
Start: 2025-02-16 | End: 2025-02-18 | Stop reason: SDUPTHER

## 2025-02-16 RX ORDER — SODIUM CHLORIDE 9 MG/ML
INJECTION, SOLUTION INTRAVENOUS PRN
Status: DISCONTINUED | OUTPATIENT
Start: 2025-02-16 | End: 2025-02-18 | Stop reason: SDUPTHER

## 2025-02-16 RX ORDER — ONDANSETRON 2 MG/ML
4 INJECTION INTRAMUSCULAR; INTRAVENOUS EVERY 6 HOURS PRN
Status: DISCONTINUED | OUTPATIENT
Start: 2025-02-16 | End: 2025-02-16

## 2025-02-16 RX ORDER — GLUCAGON 1 MG/ML
1 KIT INJECTION PRN
Status: DISCONTINUED | OUTPATIENT
Start: 2025-02-16 | End: 2025-02-20 | Stop reason: HOSPADM

## 2025-02-16 RX ORDER — ONDANSETRON 4 MG/1
4 TABLET, ORALLY DISINTEGRATING ORAL EVERY 8 HOURS PRN
Status: DISCONTINUED | OUTPATIENT
Start: 2025-02-16 | End: 2025-02-16

## 2025-02-16 RX ORDER — ONDANSETRON 4 MG/1
4 TABLET, ORALLY DISINTEGRATING ORAL EVERY 8 HOURS PRN
Status: DISCONTINUED | OUTPATIENT
Start: 2025-02-16 | End: 2025-02-20 | Stop reason: HOSPADM

## 2025-02-16 RX ORDER — SODIUM CHLORIDE 0.9 % (FLUSH) 0.9 %
5-40 SYRINGE (ML) INJECTION EVERY 12 HOURS SCHEDULED
Status: DISCONTINUED | OUTPATIENT
Start: 2025-02-16 | End: 2025-02-18 | Stop reason: SDUPTHER

## 2025-02-16 RX ORDER — POTASSIUM CHLORIDE 1500 MG/1
40 TABLET, EXTENDED RELEASE ORAL PRN
Status: DISCONTINUED | OUTPATIENT
Start: 2025-02-16 | End: 2025-02-16

## 2025-02-16 RX ORDER — ESCITALOPRAM OXALATE 5 MG/1
5 TABLET ORAL DAILY
Status: ON HOLD | COMMUNITY
End: 2025-02-20 | Stop reason: HOSPADM

## 2025-02-16 RX ORDER — PROCHLORPERAZINE EDISYLATE 5 MG/ML
10 INJECTION INTRAMUSCULAR; INTRAVENOUS EVERY 6 HOURS PRN
Status: DISCONTINUED | OUTPATIENT
Start: 2025-02-16 | End: 2025-02-20 | Stop reason: HOSPADM

## 2025-02-16 RX ORDER — CARBIDOPA AND LEVODOPA 25; 100 MG/1; MG/1
2 TABLET ORAL 2 TIMES DAILY
Status: DISCONTINUED | OUTPATIENT
Start: 2025-02-16 | End: 2025-02-16

## 2025-02-16 RX ORDER — CARBIDOPA AND LEVODOPA 25; 100 MG/1; MG/1
1 TABLET ORAL 2 TIMES DAILY
Status: DISCONTINUED | OUTPATIENT
Start: 2025-02-16 | End: 2025-02-16

## 2025-02-16 RX ORDER — GABAPENTIN 300 MG/1
600 CAPSULE ORAL 3 TIMES DAILY
Status: DISCONTINUED | OUTPATIENT
Start: 2025-02-16 | End: 2025-02-20 | Stop reason: HOSPADM

## 2025-02-16 RX ORDER — POLYETHYLENE GLYCOL 3350 17 G/17G
17 POWDER, FOR SOLUTION ORAL DAILY PRN
Status: DISCONTINUED | OUTPATIENT
Start: 2025-02-16 | End: 2025-02-20 | Stop reason: HOSPADM

## 2025-02-16 RX ORDER — POTASSIUM CHLORIDE 7.45 MG/ML
10 INJECTION INTRAVENOUS PRN
Status: DISCONTINUED | OUTPATIENT
Start: 2025-02-16 | End: 2025-02-16

## 2025-02-16 RX ORDER — ONDANSETRON 2 MG/ML
4 INJECTION INTRAMUSCULAR; INTRAVENOUS EVERY 6 HOURS PRN
Status: DISCONTINUED | OUTPATIENT
Start: 2025-02-16 | End: 2025-02-20 | Stop reason: HOSPADM

## 2025-02-16 RX ORDER — MAGNESIUM SULFATE IN WATER 40 MG/ML
2000 INJECTION, SOLUTION INTRAVENOUS PRN
Status: DISCONTINUED | OUTPATIENT
Start: 2025-02-16 | End: 2025-02-16 | Stop reason: SDUPTHER

## 2025-02-16 RX ORDER — SODIUM CHLORIDE 0.9 % (FLUSH) 0.9 %
10 SYRINGE (ML) INJECTION EVERY 12 HOURS SCHEDULED
Status: DISCONTINUED | OUTPATIENT
Start: 2025-02-16 | End: 2025-02-18 | Stop reason: SDUPTHER

## 2025-02-16 RX ORDER — MAGNESIUM SULFATE IN WATER 40 MG/ML
2000 INJECTION, SOLUTION INTRAVENOUS PRN
Status: DISCONTINUED | OUTPATIENT
Start: 2025-02-16 | End: 2025-02-20 | Stop reason: HOSPADM

## 2025-02-16 RX ORDER — AMLODIPINE BESYLATE 5 MG/1
5 TABLET ORAL DAILY
Status: DISCONTINUED | OUTPATIENT
Start: 2025-02-16 | End: 2025-02-20 | Stop reason: HOSPADM

## 2025-02-16 RX ORDER — PROCHLORPERAZINE MALEATE 10 MG
10 TABLET ORAL EVERY 8 HOURS PRN
Status: DISCONTINUED | OUTPATIENT
Start: 2025-02-16 | End: 2025-02-20 | Stop reason: HOSPADM

## 2025-02-16 RX ORDER — SENNOSIDES A AND B 8.6 MG/1
2 TABLET, FILM COATED ORAL DAILY
Status: ON HOLD | COMMUNITY
End: 2025-02-20 | Stop reason: HOSPADM

## 2025-02-16 RX ORDER — ATORVASTATIN CALCIUM 40 MG/1
40 TABLET, FILM COATED ORAL DAILY
Status: DISCONTINUED | OUTPATIENT
Start: 2025-02-16 | End: 2025-02-20 | Stop reason: HOSPADM

## 2025-02-16 RX ORDER — HYDROCODONE BITARTRATE AND ACETAMINOPHEN 5; 325 MG/1; MG/1
1 TABLET ORAL EVERY 6 HOURS PRN
Status: DISCONTINUED | OUTPATIENT
Start: 2025-02-16 | End: 2025-02-20 | Stop reason: HOSPADM

## 2025-02-16 RX ORDER — POTASSIUM CHLORIDE 7.45 MG/ML
10 INJECTION INTRAVENOUS PRN
Status: DISCONTINUED | OUTPATIENT
Start: 2025-02-16 | End: 2025-02-20 | Stop reason: HOSPADM

## 2025-02-16 RX ORDER — DEXTROSE MONOHYDRATE 100 MG/ML
INJECTION, SOLUTION INTRAVENOUS CONTINUOUS PRN
Status: DISCONTINUED | OUTPATIENT
Start: 2025-02-16 | End: 2025-02-20 | Stop reason: HOSPADM

## 2025-02-16 RX ORDER — ESCITALOPRAM OXALATE 10 MG/1
5 TABLET ORAL DAILY
Status: DISCONTINUED | OUTPATIENT
Start: 2025-02-16 | End: 2025-02-17

## 2025-02-16 RX ORDER — POTASSIUM CHLORIDE 1500 MG/1
40 TABLET, EXTENDED RELEASE ORAL PRN
Status: DISCONTINUED | OUTPATIENT
Start: 2025-02-16 | End: 2025-02-20 | Stop reason: HOSPADM

## 2025-02-16 RX ORDER — FENTANYL CITRATE 50 UG/ML
50 INJECTION, SOLUTION INTRAMUSCULAR; INTRAVENOUS ONCE
Status: COMPLETED | OUTPATIENT
Start: 2025-02-16 | End: 2025-02-16

## 2025-02-16 RX ORDER — SENNOSIDES A AND B 8.6 MG/1
1 TABLET, FILM COATED ORAL DAILY PRN
Status: DISCONTINUED | OUTPATIENT
Start: 2025-02-16 | End: 2025-02-20 | Stop reason: HOSPADM

## 2025-02-16 RX ORDER — RASAGILINE 1 MG/1
1 TABLET ORAL DAILY
Status: DISCONTINUED | OUTPATIENT
Start: 2025-02-16 | End: 2025-02-20 | Stop reason: HOSPADM

## 2025-02-16 RX ORDER — SODIUM CHLORIDE 0.9 % (FLUSH) 0.9 %
5-40 SYRINGE (ML) INJECTION PRN
Status: DISCONTINUED | OUTPATIENT
Start: 2025-02-16 | End: 2025-02-18 | Stop reason: SDUPTHER

## 2025-02-16 RX ORDER — IPRATROPIUM BROMIDE AND ALBUTEROL SULFATE 2.5; .5 MG/3ML; MG/3ML
1 SOLUTION RESPIRATORY (INHALATION) EVERY 4 HOURS PRN
Status: DISCONTINUED | OUTPATIENT
Start: 2025-02-16 | End: 2025-02-20 | Stop reason: HOSPADM

## 2025-02-16 RX ORDER — MORPHINE SULFATE 2 MG/ML
1 INJECTION, SOLUTION INTRAMUSCULAR; INTRAVENOUS ONCE
Status: COMPLETED | OUTPATIENT
Start: 2025-02-16 | End: 2025-02-16

## 2025-02-16 RX ADMIN — RASAGILINE 1 MG: 1 TABLET ORAL at 09:57

## 2025-02-16 RX ADMIN — ESCITALOPRAM OXALATE 5 MG: 10 TABLET ORAL at 11:21

## 2025-02-16 RX ADMIN — AMLODIPINE BESYLATE 5 MG: 5 TABLET ORAL at 09:56

## 2025-02-16 RX ADMIN — CARBIDOPA AND LEVODOPA 2 TABLET: 25; 100 TABLET ORAL at 11:21

## 2025-02-16 RX ADMIN — CARBIDOPA AND LEVODOPA 1 TABLET: 25; 100 TABLET ORAL at 23:05

## 2025-02-16 RX ADMIN — DIVALPROEX SODIUM 500 MG: 125 CAPSULE, COATED PELLETS ORAL at 15:14

## 2025-02-16 RX ADMIN — HYDROCODONE BITARTRATE AND ACETAMINOPHEN 1 TABLET: 5; 325 TABLET ORAL at 09:01

## 2025-02-16 RX ADMIN — DIVALPROEX SODIUM 500 MG: 125 CAPSULE, COATED PELLETS ORAL at 23:04

## 2025-02-16 RX ADMIN — SODIUM CHLORIDE, PRESERVATIVE FREE 10 ML: 5 INJECTION INTRAVENOUS at 09:57

## 2025-02-16 RX ADMIN — GABAPENTIN 600 MG: 300 CAPSULE ORAL at 23:04

## 2025-02-16 RX ADMIN — SODIUM CHLORIDE, PRESERVATIVE FREE 10 ML: 5 INJECTION INTRAVENOUS at 23:10

## 2025-02-16 RX ADMIN — ATORVASTATIN CALCIUM 40 MG: 40 TABLET, FILM COATED ORAL at 09:56

## 2025-02-16 RX ADMIN — GABAPENTIN 600 MG: 300 CAPSULE ORAL at 15:14

## 2025-02-16 RX ADMIN — MORPHINE SULFATE 1 MG: 2 INJECTION, SOLUTION INTRAMUSCULAR; INTRAVENOUS at 11:22

## 2025-02-16 RX ADMIN — DIVALPROEX SODIUM 500 MG: 125 CAPSULE, COATED PELLETS ORAL at 09:56

## 2025-02-16 RX ADMIN — FENTANYL CITRATE 50 MCG: 50 INJECTION INTRAMUSCULAR; INTRAVENOUS at 04:14

## 2025-02-16 RX ADMIN — HYDROCODONE BITARTRATE AND ACETAMINOPHEN 1 TABLET: 5; 325 TABLET ORAL at 15:15

## 2025-02-16 RX ADMIN — CARBIDOPA AND LEVODOPA 2 TABLET: 25; 100 TABLET ORAL at 16:44

## 2025-02-16 RX ADMIN — HYDROCODONE BITARTRATE AND ACETAMINOPHEN 1 TABLET: 5; 325 TABLET ORAL at 23:05

## 2025-02-16 RX ADMIN — GABAPENTIN 600 MG: 300 CAPSULE ORAL at 09:56

## 2025-02-16 ASSESSMENT — PAIN DESCRIPTION - DESCRIPTORS
DESCRIPTORS: ACHING
DESCRIPTORS: DISCOMFORT

## 2025-02-16 ASSESSMENT — PAIN SCALES - GENERAL
PAINLEVEL_OUTOF10: 7
PAINLEVEL_OUTOF10: 1
PAINLEVEL_OUTOF10: 7
PAINLEVEL_OUTOF10: 7

## 2025-02-16 ASSESSMENT — LIFESTYLE VARIABLES
HOW OFTEN DO YOU HAVE A DRINK CONTAINING ALCOHOL: NEVER
HOW MANY STANDARD DRINKS CONTAINING ALCOHOL DO YOU HAVE ON A TYPICAL DAY: PATIENT DOES NOT DRINK

## 2025-02-16 ASSESSMENT — PAIN DESCRIPTION - LOCATION
LOCATION: HIP
LOCATION: HIP
LOCATION: LEG

## 2025-02-16 ASSESSMENT — PAIN DESCRIPTION - ORIENTATION
ORIENTATION: LEFT

## 2025-02-16 NOTE — ED NOTES
ED to Inpatient Handoff Report    Notified Marley that electronic handoff available and patient ready for transport to room 726.    Safety Risks: Home safety issues, Difficulty with daily activities, and Risk of falls    Patient in Restraints: no    Constant Observer or Patient : no    Telemetry Monitoring Ordered :Yes           Order to transfer to unit without monitor:N/A    Last MEWS: 1 Time completed: 0624    Deterioration Index Score:   Predictive Model Details          26 (Normal)  Factor Value    Calculated 2/16/2025 06:25 51% Age 80 years old    Deterioration Index Model 16% Systolic 165     14% Potassium 4.7 mmol/L     8% Pulse 100     7% Pulse oximetry 92 %     4% BUN abnormal (25 mg/dL)     1% WBC count 8.1 k/uL     0% Respiratory rate 16     0% Temperature 98.5 °F (36.9 °C)     0% Hematocrit 40.3 %     0% Sodium 139 mmol/L        Vitals:    02/16/25 0338 02/16/25 0622 02/16/25 0623 02/16/25 0624   BP: (!) 105/30  (!) 165/93 (!) 165/93   Pulse:  100 (!) 102 100   Resp:  12 16 16   Temp:    98.5 °F (36.9 °C)   SpO2:  92% (!) 85% 92%   Weight:       Height:             Opportunity for questions and clarification was provided.     
Awake

## 2025-02-16 NOTE — CARE COORDINATION
Internal Medicine On-call Care Coordination Note    I was called by the ED physician because they recommended admission for this patient and we cover their PCP.  The history as I understand it after discussion with the ED physician is as follows:    Presents from rehab after a fall on eliquis  Recent stroke with slurred speech at baseline  Found to have L hip fx  ED spoke with neurology and reviewed CT findings- neuro did not feel that patient needed transferred to [facility]    I placed admission orders.  Including:    General admission orders  Reconciled home meds  Ortho consulted in ED  NPO  Held anticoagulants    Dr. Doyle, or our coverage will see the patient for H&P.    Electronically signed by LIDIA Eaton CNP on 2/16/2025 at 6:19 AM

## 2025-02-16 NOTE — ED PROVIDER NOTES
(1-2 VIEWS)   Final Result   1. Fracture involving the left femoral neck with some superior displacement   of the distal fracture fragment.   2. Deformity identified of the midshaft of the left femur possibly from old   healed injury.   3. Satisfactory alignment of the left knee prosthesis.         XR HIP 2-3 VW W PELVIS LEFT   Final Result   1. Fracture involving the left femoral neck with some superior displacement   of the distal fracture fragment.   2. Deformity identified of the midshaft of the left femur possibly from old   healed injury.   3. Satisfactory alignment of the left knee prosthesis.         CT HEAD WO CONTRAST   Final Result   Subacute appearing infarct in the left temporal region with new curvilinear   gyriform like hyperattenuation which may reflect petechial hemorrhage.         CT CERVICAL SPINE WO CONTRAST   Final Result   No acute fracture or traumatic malalignment of the cervical spine. Anterior   fusion involving C3 through C7. No hardware complication.         CT CHEST WO CONTRAST   Final Result   1. Large chest wall hematoma in the lateral left posterior back incompletely   imaged in the field of view.   2. Nondisplaced left posterior 10th rib fracture.   3. Ground-glass opacity identified posteriorly within the left upper lobe and   lower lung fields bilaterally suggesting atelectatic change or possible early   infiltrate.   4. Small hiatal hernia with mild wall thickening of the distal esophagus in   which an esophagitis cannot be excluded.                   ------------------------- NURSING NOTES AND VITALS REVIEWED ---------------------------  Date / Time Roomed:  2/16/2025  3:09 AM  ED Bed Assignment:  0726/0726-A    The nursing notes within the ED encounter and vital signs as below have been reviewed.     Patient Vitals for the past 24 hrs:   BP Temp Temp src Pulse Resp SpO2 Height Weight   02/16/25 0657 (!) 147/67 98.8 °F (37.1 °C) Axillary (!) 102 18 93 % -- --   02/16/25 0624 (!)

## 2025-02-17 ENCOUNTER — APPOINTMENT (OUTPATIENT)
Dept: GENERAL RADIOLOGY | Age: 80
DRG: 521 | End: 2025-02-17
Payer: MEDICARE

## 2025-02-17 ENCOUNTER — ANESTHESIA EVENT (OUTPATIENT)
Dept: OPERATING ROOM | Age: 80
DRG: 521 | End: 2025-02-17
Payer: MEDICARE

## 2025-02-17 ENCOUNTER — ANESTHESIA (OUTPATIENT)
Dept: OPERATING ROOM | Age: 80
DRG: 521 | End: 2025-02-17
Payer: MEDICARE

## 2025-02-17 LAB
ABO + RH BLD: NORMAL
ALBUMIN SERPL-MCNC: 3.1 G/DL (ref 3.5–5.2)
ALP SERPL-CCNC: 72 U/L (ref 40–129)
ALT SERPL-CCNC: 6 U/L (ref 0–40)
ANION GAP SERPL CALCULATED.3IONS-SCNC: 11 MMOL/L (ref 7–16)
ARM BAND NUMBER: NORMAL
AST SERPL-CCNC: 26 U/L (ref 0–39)
BASOPHILS # BLD: 0.06 K/UL (ref 0–0.2)
BASOPHILS NFR BLD: 1 % (ref 0–2)
BILIRUB SERPL-MCNC: 0.7 MG/DL (ref 0–1.2)
BLOOD BANK SAMPLE EXPIRATION: NORMAL
BLOOD GROUP ANTIBODIES SERPL: NEGATIVE
BUN SERPL-MCNC: 24 MG/DL (ref 6–23)
CALCIUM SERPL-MCNC: 9 MG/DL (ref 8.6–10.2)
CHLORIDE SERPL-SCNC: 104 MMOL/L (ref 98–107)
CO2 SERPL-SCNC: 24 MMOL/L (ref 22–29)
CREAT SERPL-MCNC: 1.4 MG/DL (ref 0.7–1.2)
EOSINOPHIL # BLD: 0.27 K/UL (ref 0.05–0.5)
EOSINOPHILS RELATIVE PERCENT: 4 % (ref 0–6)
ERYTHROCYTE [DISTWIDTH] IN BLOOD BY AUTOMATED COUNT: 12.9 % (ref 11.5–15)
GFR, ESTIMATED: 51 ML/MIN/1.73M2
GLUCOSE BLD-MCNC: 104 MG/DL (ref 74–99)
GLUCOSE BLD-MCNC: 133 MG/DL (ref 74–99)
GLUCOSE BLD-MCNC: 95 MG/DL (ref 74–99)
GLUCOSE BLD-MCNC: 95 MG/DL (ref 74–99)
GLUCOSE SERPL-MCNC: 105 MG/DL (ref 74–99)
HBA1C MFR BLD: 6.5 % (ref 4–5.6)
HCT VFR BLD AUTO: 39.1 % (ref 37–54)
HGB BLD-MCNC: 12.2 G/DL (ref 12.5–16.5)
IMM GRANULOCYTES # BLD AUTO: 0.11 K/UL (ref 0–0.58)
IMM GRANULOCYTES NFR BLD: 1 % (ref 0–5)
LYMPHOCYTES NFR BLD: 0.94 K/UL (ref 1.5–4)
LYMPHOCYTES RELATIVE PERCENT: 12 % (ref 20–42)
MCH RBC QN AUTO: 31.1 PG (ref 26–35)
MCHC RBC AUTO-ENTMCNC: 31.2 G/DL (ref 32–34.5)
MCV RBC AUTO: 99.7 FL (ref 80–99.9)
MONOCYTES NFR BLD: 1.28 K/UL (ref 0.1–0.95)
MONOCYTES NFR BLD: 17 % (ref 2–12)
NEUTROPHILS NFR BLD: 65 % (ref 43–80)
NEUTS SEG NFR BLD: 4.99 K/UL (ref 1.8–7.3)
PLATELET # BLD AUTO: 136 K/UL (ref 130–450)
PMV BLD AUTO: 10.6 FL (ref 7–12)
POTASSIUM SERPL-SCNC: 4.5 MMOL/L (ref 3.5–5)
PROT SERPL-MCNC: 5.9 G/DL (ref 6.4–8.3)
RBC # BLD AUTO: 3.92 M/UL (ref 3.8–5.8)
SODIUM SERPL-SCNC: 139 MMOL/L (ref 132–146)
WBC OTHER # BLD: 7.7 K/UL (ref 4.5–11.5)

## 2025-02-17 PROCEDURE — 7100000000 HC PACU RECOVERY - FIRST 15 MIN: Performed by: ORTHOPAEDIC SURGERY

## 2025-02-17 PROCEDURE — 1200000000 HC SEMI PRIVATE

## 2025-02-17 PROCEDURE — 7100000001 HC PACU RECOVERY - ADDTL 15 MIN: Performed by: ORTHOPAEDIC SURGERY

## 2025-02-17 PROCEDURE — 83036 HEMOGLOBIN GLYCOSYLATED A1C: CPT

## 2025-02-17 PROCEDURE — 3700000001 HC ADD 15 MINUTES (ANESTHESIA): Performed by: ORTHOPAEDIC SURGERY

## 2025-02-17 PROCEDURE — 80053 COMPREHEN METABOLIC PANEL: CPT

## 2025-02-17 PROCEDURE — 6360000002 HC RX W HCPCS: Performed by: INTERNAL MEDICINE

## 2025-02-17 PROCEDURE — 6370000000 HC RX 637 (ALT 250 FOR IP): Performed by: ORTHOPAEDIC SURGERY

## 2025-02-17 PROCEDURE — C1776 JOINT DEVICE (IMPLANTABLE): HCPCS | Performed by: ORTHOPAEDIC SURGERY

## 2025-02-17 PROCEDURE — 0SRS0JZ REPLACEMENT OF LEFT HIP JOINT, FEMORAL SURFACE WITH SYNTHETIC SUBSTITUTE, OPEN APPROACH: ICD-10-PCS | Performed by: ORTHOPAEDIC SURGERY

## 2025-02-17 PROCEDURE — 2500000003 HC RX 250 WO HCPCS: Performed by: ORTHOPAEDIC SURGERY

## 2025-02-17 PROCEDURE — 2500000003 HC RX 250 WO HCPCS: Performed by: NURSE PRACTITIONER

## 2025-02-17 PROCEDURE — 94664 DEMO&/EVAL PT USE INHALER: CPT

## 2025-02-17 PROCEDURE — 06H03DZ INSERTION OF INTRALUMINAL DEVICE INTO INFERIOR VENA CAVA, PERCUTANEOUS APPROACH: ICD-10-PCS | Performed by: ORTHOPAEDIC SURGERY

## 2025-02-17 PROCEDURE — 37191 INS ENDOVAS VENA CAVA FILTR: CPT | Performed by: SURGERY

## 2025-02-17 PROCEDURE — 6360000002 HC RX W HCPCS: Performed by: ORTHOPAEDIC SURGERY

## 2025-02-17 PROCEDURE — 6370000000 HC RX 637 (ALT 250 FOR IP): Performed by: INTERNAL MEDICINE

## 2025-02-17 PROCEDURE — 2580000003 HC RX 258: Performed by: ORTHOPAEDIC SURGERY

## 2025-02-17 PROCEDURE — 82962 GLUCOSE BLOOD TEST: CPT

## 2025-02-17 PROCEDURE — 3600000004 HC SURGERY LEVEL 4 BASE: Performed by: ORTHOPAEDIC SURGERY

## 2025-02-17 PROCEDURE — 99223 1ST HOSP IP/OBS HIGH 75: CPT | Performed by: SURGERY

## 2025-02-17 PROCEDURE — 3700000000 HC ANESTHESIA ATTENDED CARE: Performed by: ORTHOPAEDIC SURGERY

## 2025-02-17 PROCEDURE — 2500000003 HC RX 250 WO HCPCS

## 2025-02-17 PROCEDURE — 94640 AIRWAY INHALATION TREATMENT: CPT

## 2025-02-17 PROCEDURE — 2700000000 HC OXYGEN THERAPY PER DAY

## 2025-02-17 PROCEDURE — 6370000000 HC RX 637 (ALT 250 FOR IP): Performed by: ANESTHESIOLOGY

## 2025-02-17 PROCEDURE — 6370000000 HC RX 637 (ALT 250 FOR IP): Performed by: NURSE PRACTITIONER

## 2025-02-17 PROCEDURE — 36415 COLL VENOUS BLD VENIPUNCTURE: CPT

## 2025-02-17 PROCEDURE — 73501 X-RAY EXAM HIP UNI 1 VIEW: CPT

## 2025-02-17 PROCEDURE — 3600000014 HC SURGERY LEVEL 4 ADDTL 15MIN: Performed by: ORTHOPAEDIC SURGERY

## 2025-02-17 PROCEDURE — 2580000003 HC RX 258: Performed by: INTERNAL MEDICINE

## 2025-02-17 PROCEDURE — C1769 GUIDE WIRE: HCPCS | Performed by: ORTHOPAEDIC SURGERY

## 2025-02-17 PROCEDURE — 86901 BLOOD TYPING SEROLOGIC RH(D): CPT

## 2025-02-17 PROCEDURE — 85025 COMPLETE CBC W/AUTO DIFF WBC: CPT

## 2025-02-17 PROCEDURE — C1880 VENA CAVA FILTER: HCPCS | Performed by: ORTHOPAEDIC SURGERY

## 2025-02-17 PROCEDURE — 86900 BLOOD TYPING SEROLOGIC ABO: CPT

## 2025-02-17 PROCEDURE — 2709999900 HC NON-CHARGEABLE SUPPLY: Performed by: ORTHOPAEDIC SURGERY

## 2025-02-17 PROCEDURE — 86850 RBC ANTIBODY SCREEN: CPT

## 2025-02-17 PROCEDURE — 6360000002 HC RX W HCPCS

## 2025-02-17 DEVICE — IMPLANTABLE DEVICE: Type: IMPLANTABLE DEVICE | Site: HIP | Status: FUNCTIONAL

## 2025-02-17 DEVICE — IMPLANTABLE DEVICE
Type: IMPLANTABLE DEVICE | Site: HIP | Status: FUNCTIONAL
Brand: RINGLOC BI-POLAR HIP SYSTEM

## 2025-02-17 DEVICE — IMPLANTABLE DEVICE
Type: IMPLANTABLE DEVICE | Site: HIP | Status: FUNCTIONAL
Brand: AVENIR COMPLETE™

## 2025-02-17 DEVICE — FILTER VASC L50MM DIA30MM INTRO 7FR L65CM VENA CAVA FEM W: Type: IMPLANTABLE DEVICE | Site: VENA CAVA | Status: FUNCTIONAL

## 2025-02-17 RX ORDER — LABETALOL HYDROCHLORIDE 5 MG/ML
10 INJECTION, SOLUTION INTRAVENOUS
Status: DISCONTINUED | OUTPATIENT
Start: 2025-02-17 | End: 2025-02-17 | Stop reason: HOSPADM

## 2025-02-17 RX ORDER — SODIUM CHLORIDE 9 MG/ML
INJECTION, SOLUTION INTRAVENOUS PRN
Status: DISCONTINUED | OUTPATIENT
Start: 2025-02-17 | End: 2025-02-17 | Stop reason: HOSPADM

## 2025-02-17 RX ORDER — ASPIRIN 81 MG/1
81 TABLET, CHEWABLE ORAL DAILY
Status: DISCONTINUED | OUTPATIENT
Start: 2025-02-18 | End: 2025-02-20 | Stop reason: HOSPADM

## 2025-02-17 RX ORDER — TRANEXAMIC ACID 100 MG/ML
INJECTION, SOLUTION INTRAVENOUS PRN
Status: DISCONTINUED | OUTPATIENT
Start: 2025-02-17 | End: 2025-02-17 | Stop reason: ALTCHOICE

## 2025-02-17 RX ORDER — SENNOSIDES A AND B 8.6 MG/1
2 TABLET, FILM COATED ORAL DAILY
Status: DISCONTINUED | OUTPATIENT
Start: 2025-02-17 | End: 2025-02-20 | Stop reason: HOSPADM

## 2025-02-17 RX ORDER — IPRATROPIUM BROMIDE AND ALBUTEROL SULFATE 2.5; .5 MG/3ML; MG/3ML
1 SOLUTION RESPIRATORY (INHALATION) ONCE
Status: COMPLETED | OUTPATIENT
Start: 2025-02-17 | End: 2025-02-17

## 2025-02-17 RX ORDER — TRANEXAMIC ACID 10 MG/ML
INJECTION, SOLUTION INTRAVENOUS
Status: DISPENSED
Start: 2025-02-17 | End: 2025-02-18

## 2025-02-17 RX ORDER — ESCITALOPRAM OXALATE 10 MG/1
5 TABLET ORAL DAILY
Status: DISCONTINUED | OUTPATIENT
Start: 2025-02-17 | End: 2025-02-20 | Stop reason: HOSPADM

## 2025-02-17 RX ORDER — ONDANSETRON 4 MG/1
4 TABLET, ORALLY DISINTEGRATING ORAL EVERY 8 HOURS PRN
Status: DISCONTINUED | OUTPATIENT
Start: 2025-02-17 | End: 2025-02-17 | Stop reason: CLARIF

## 2025-02-17 RX ORDER — LIDOCAINE HYDROCHLORIDE 20 MG/ML
INJECTION, SOLUTION EPIDURAL; INFILTRATION; INTRACAUDAL; PERINEURAL
Status: DISCONTINUED | OUTPATIENT
Start: 2025-02-17 | End: 2025-02-17 | Stop reason: SDUPTHER

## 2025-02-17 RX ORDER — ASPIRIN 81 MG/1
81 TABLET, CHEWABLE ORAL DAILY
Status: DISCONTINUED | OUTPATIENT
Start: 2025-02-17 | End: 2025-02-17

## 2025-02-17 RX ORDER — ROCURONIUM BROMIDE 10 MG/ML
INJECTION, SOLUTION INTRAVENOUS
Status: DISCONTINUED | OUTPATIENT
Start: 2025-02-17 | End: 2025-02-17 | Stop reason: SDUPTHER

## 2025-02-17 RX ORDER — ESCITALOPRAM OXALATE 10 MG/1
5 TABLET ORAL DAILY
Status: DISCONTINUED | OUTPATIENT
Start: 2025-02-17 | End: 2025-02-17

## 2025-02-17 RX ORDER — VANCOMYCIN HYDROCHLORIDE 1 G/20ML
INJECTION, POWDER, LYOPHILIZED, FOR SOLUTION INTRAVENOUS PRN
Status: DISCONTINUED | OUTPATIENT
Start: 2025-02-17 | End: 2025-02-17 | Stop reason: ALTCHOICE

## 2025-02-17 RX ORDER — SODIUM CHLORIDE 9 MG/ML
INJECTION, SOLUTION INTRAVENOUS PRN
Status: DISCONTINUED | OUTPATIENT
Start: 2025-02-17 | End: 2025-02-20 | Stop reason: HOSPADM

## 2025-02-17 RX ORDER — SENNA AND DOCUSATE SODIUM 50; 8.6 MG/1; MG/1
1 TABLET, FILM COATED ORAL 2 TIMES DAILY
Status: DISCONTINUED | OUTPATIENT
Start: 2025-02-17 | End: 2025-02-20 | Stop reason: HOSPADM

## 2025-02-17 RX ORDER — SODIUM CHLORIDE 0.9 % (FLUSH) 0.9 %
5-40 SYRINGE (ML) INJECTION PRN
Status: DISCONTINUED | OUTPATIENT
Start: 2025-02-17 | End: 2025-02-17 | Stop reason: HOSPADM

## 2025-02-17 RX ORDER — SODIUM CHLORIDE 9 MG/ML
INJECTION, SOLUTION INTRAVENOUS CONTINUOUS
Status: DISCONTINUED | OUTPATIENT
Start: 2025-02-17 | End: 2025-02-18

## 2025-02-17 RX ORDER — ONDANSETRON 2 MG/ML
INJECTION INTRAMUSCULAR; INTRAVENOUS
Status: DISCONTINUED | OUTPATIENT
Start: 2025-02-17 | End: 2025-02-17 | Stop reason: SDUPTHER

## 2025-02-17 RX ORDER — SODIUM CHLORIDE 0.9 % (FLUSH) 0.9 %
5-40 SYRINGE (ML) INJECTION EVERY 12 HOURS SCHEDULED
Status: DISCONTINUED | OUTPATIENT
Start: 2025-02-17 | End: 2025-02-17 | Stop reason: HOSPADM

## 2025-02-17 RX ORDER — IPRATROPIUM BROMIDE AND ALBUTEROL SULFATE 2.5; .5 MG/3ML; MG/3ML
1 SOLUTION RESPIRATORY (INHALATION)
Status: DISCONTINUED | OUTPATIENT
Start: 2025-02-17 | End: 2025-02-17 | Stop reason: HOSPADM

## 2025-02-17 RX ORDER — MORPHINE SULFATE 2 MG/ML
2 INJECTION, SOLUTION INTRAMUSCULAR; INTRAVENOUS EVERY 4 HOURS PRN
Status: DISCONTINUED | OUTPATIENT
Start: 2025-02-17 | End: 2025-02-18

## 2025-02-17 RX ORDER — DEXAMETHASONE SODIUM PHOSPHATE 4 MG/ML
INJECTION, SOLUTION INTRA-ARTICULAR; INTRALESIONAL; INTRAMUSCULAR; INTRAVENOUS; SOFT TISSUE
Status: DISCONTINUED | OUTPATIENT
Start: 2025-02-17 | End: 2025-02-17 | Stop reason: SDUPTHER

## 2025-02-17 RX ORDER — PROPOFOL 10 MG/ML
INJECTION, EMULSION INTRAVENOUS
Status: DISCONTINUED | OUTPATIENT
Start: 2025-02-17 | End: 2025-02-17 | Stop reason: SDUPTHER

## 2025-02-17 RX ORDER — HYDRALAZINE HYDROCHLORIDE 20 MG/ML
10 INJECTION INTRAMUSCULAR; INTRAVENOUS
Status: DISCONTINUED | OUTPATIENT
Start: 2025-02-17 | End: 2025-02-17 | Stop reason: HOSPADM

## 2025-02-17 RX ORDER — FAMOTIDINE 20 MG/1
20 TABLET, FILM COATED ORAL 2 TIMES DAILY
Status: DISCONTINUED | OUTPATIENT
Start: 2025-02-17 | End: 2025-02-19

## 2025-02-17 RX ORDER — SODIUM CHLORIDE 0.9 % (FLUSH) 0.9 %
5-40 SYRINGE (ML) INJECTION EVERY 12 HOURS SCHEDULED
Status: DISCONTINUED | OUTPATIENT
Start: 2025-02-17 | End: 2025-02-20 | Stop reason: HOSPADM

## 2025-02-17 RX ORDER — MIDAZOLAM HYDROCHLORIDE 2 MG/2ML
2 INJECTION, SOLUTION INTRAMUSCULAR; INTRAVENOUS
Status: DISCONTINUED | OUTPATIENT
Start: 2025-02-17 | End: 2025-02-17 | Stop reason: HOSPADM

## 2025-02-17 RX ORDER — ONDANSETRON 2 MG/ML
4 INJECTION INTRAMUSCULAR; INTRAVENOUS
Status: DISCONTINUED | OUTPATIENT
Start: 2025-02-17 | End: 2025-02-17 | Stop reason: HOSPADM

## 2025-02-17 RX ORDER — HEPARIN 100 UNIT/ML
SYRINGE INTRAVENOUS PRN
Status: DISCONTINUED | OUTPATIENT
Start: 2025-02-17 | End: 2025-02-17 | Stop reason: ALTCHOICE

## 2025-02-17 RX ORDER — FENTANYL CITRATE 50 UG/ML
INJECTION, SOLUTION INTRAMUSCULAR; INTRAVENOUS
Status: DISCONTINUED | OUTPATIENT
Start: 2025-02-17 | End: 2025-02-17 | Stop reason: SDUPTHER

## 2025-02-17 RX ORDER — GLIPIZIDE 5 MG/1
5 TABLET ORAL
Status: DISCONTINUED | OUTPATIENT
Start: 2025-02-17 | End: 2025-02-20 | Stop reason: HOSPADM

## 2025-02-17 RX ORDER — NALOXONE HYDROCHLORIDE 0.4 MG/ML
INJECTION, SOLUTION INTRAMUSCULAR; INTRAVENOUS; SUBCUTANEOUS PRN
Status: DISCONTINUED | OUTPATIENT
Start: 2025-02-17 | End: 2025-02-17 | Stop reason: HOSPADM

## 2025-02-17 RX ORDER — SODIUM CHLORIDE 0.9 % (FLUSH) 0.9 %
5-40 SYRINGE (ML) INJECTION PRN
Status: DISCONTINUED | OUTPATIENT
Start: 2025-02-17 | End: 2025-02-20 | Stop reason: HOSPADM

## 2025-02-17 RX ORDER — ONDANSETRON 2 MG/ML
4 INJECTION INTRAMUSCULAR; INTRAVENOUS EVERY 6 HOURS PRN
Status: DISCONTINUED | OUTPATIENT
Start: 2025-02-17 | End: 2025-02-17 | Stop reason: CLARIF

## 2025-02-17 RX ADMIN — DEXAMETHASONE SODIUM PHOSPHATE 10 MG: 4 INJECTION, SOLUTION INTRAMUSCULAR; INTRAVENOUS at 16:22

## 2025-02-17 RX ADMIN — WATER 3000 MG: 1 INJECTION INTRAMUSCULAR; INTRAVENOUS; SUBCUTANEOUS at 16:25

## 2025-02-17 RX ADMIN — LIDOCAINE HYDROCHLORIDE 100 MG: 20 INJECTION, SOLUTION EPIDURAL; INFILTRATION; INTRACAUDAL; PERINEURAL at 16:15

## 2025-02-17 RX ADMIN — ROCURONIUM BROMIDE 40 MG: 10 INJECTION, SOLUTION INTRAVENOUS at 16:15

## 2025-02-17 RX ADMIN — GABAPENTIN 600 MG: 300 CAPSULE ORAL at 21:09

## 2025-02-17 RX ADMIN — MORPHINE SULFATE 2 MG: 2 INJECTION, SOLUTION INTRAMUSCULAR; INTRAVENOUS at 20:54

## 2025-02-17 RX ADMIN — SUGAMMADEX 200 MG: 100 INJECTION, SOLUTION INTRAVENOUS at 18:21

## 2025-02-17 RX ADMIN — SODIUM CHLORIDE: 9 INJECTION, SOLUTION INTRAVENOUS at 20:34

## 2025-02-17 RX ADMIN — ROCURONIUM BROMIDE 20 MG: 10 INJECTION, SOLUTION INTRAVENOUS at 16:53

## 2025-02-17 RX ADMIN — DIVALPROEX SODIUM 500 MG: 125 CAPSULE, COATED PELLETS ORAL at 21:09

## 2025-02-17 RX ADMIN — SODIUM CHLORIDE, PRESERVATIVE FREE 20 MG: 5 INJECTION INTRAVENOUS at 20:55

## 2025-02-17 RX ADMIN — MORPHINE SULFATE 2 MG: 2 INJECTION, SOLUTION INTRAMUSCULAR; INTRAVENOUS at 06:42

## 2025-02-17 RX ADMIN — FENTANYL CITRATE 50 MCG: 50 INJECTION, SOLUTION INTRAMUSCULAR; INTRAVENOUS at 16:15

## 2025-02-17 RX ADMIN — FENTANYL CITRATE 50 MCG: 50 INJECTION, SOLUTION INTRAMUSCULAR; INTRAVENOUS at 17:07

## 2025-02-17 RX ADMIN — PROPOFOL 90 MG: 10 INJECTION, EMULSION INTRAVENOUS at 16:15

## 2025-02-17 RX ADMIN — IPRATROPIUM BROMIDE AND ALBUTEROL SULFATE 1 DOSE: 2.5; .5 SOLUTION RESPIRATORY (INHALATION) at 19:03

## 2025-02-17 RX ADMIN — SODIUM CHLORIDE, PRESERVATIVE FREE 10 ML: 5 INJECTION INTRAVENOUS at 09:31

## 2025-02-17 RX ADMIN — CARBIDOPA AND LEVODOPA 1 TABLET: 25; 100 TABLET ORAL at 21:09

## 2025-02-17 RX ADMIN — ONDANSETRON 4 MG: 2 INJECTION INTRAMUSCULAR; INTRAVENOUS at 17:46

## 2025-02-17 RX ADMIN — CARBIDOPA AND LEVODOPA 2 TABLET: 25; 100 TABLET ORAL at 09:30

## 2025-02-17 RX ADMIN — SODIUM CHLORIDE: 9 INJECTION, SOLUTION INTRAVENOUS at 10:52

## 2025-02-17 RX ADMIN — MORPHINE SULFATE 2 MG: 2 INJECTION, SOLUTION INTRAMUSCULAR; INTRAVENOUS at 10:51

## 2025-02-17 ASSESSMENT — PAIN SCALES - GENERAL
PAINLEVEL_OUTOF10: 7
PAINLEVEL_OUTOF10: 0

## 2025-02-17 ASSESSMENT — PAIN DESCRIPTION - ORIENTATION
ORIENTATION: LEFT

## 2025-02-17 ASSESSMENT — LIFESTYLE VARIABLES: SMOKING_STATUS: 0

## 2025-02-17 ASSESSMENT — PAIN DESCRIPTION - LOCATION
LOCATION: HIP
LOCATION: HIP

## 2025-02-17 ASSESSMENT — PAIN DESCRIPTION - DESCRIPTORS: DESCRIPTORS: ACHING

## 2025-02-17 ASSESSMENT — ENCOUNTER SYMPTOMS: SHORTNESS OF BREATH: 1

## 2025-02-17 NOTE — ANESTHESIA PRE PROCEDURE
Department of Anesthesiology  Preprocedure Note       Name:  Frankie Hernández   Age:  80 y.o.  :  1945                                          MRN:  [id number]         Date:  2025      Surgeon: Surgeon(s):  Hieu Trujillo MD Kakkasseril, Pratheek S, MD    Procedure: Procedure(s):  LEFT HIP HEMIARTHROPLASTY     ++KATHERYN++             (AVAILABLE 1500)  VENA CAVA FILTER INSERTION    Medications prior to admission:   Prior to Admission medications    Medication Sig Start Date End Date Taking? Authorizing Provider   escitalopram (LEXAPRO) 5 MG tablet Take 1 tablet by mouth daily   Yes Jno Elizabeth MD   senna (SENOKOT) 8.6 MG tablet Take 2 tablets by mouth daily   Yes Jon Elizabeth MD   escitalopram (LEXAPRO) 5 MG tablet Take 1 tablet by mouth daily   Yes Jon Elizabeth MD   aspirin 81 MG chewable tablet Take 1 tablet by mouth daily 25   Cait Parada APRN - CNP   divalproex (DEPAKOTE SPRINKLE) 125 MG DR capsule Take 4 capsules by mouth every 8 hours 25   Cait Parada APRN - CNP   glucose 4 g chewable tablet Take 4 tablets by mouth as needed for Low blood sugar 25   Cait Parada APRN - CNP   atorvastatin (LIPITOR) 40 MG tablet Take 1 tablet by mouth daily 2/4/25 3/6/25  Cait Parada APRN - CNP   melatonin 3 MG TABS tablet Take 1 tablet by mouth nightly as needed (Insomnia) 2/4/25 3/6/25  Cait Parada APRN - CNP   Cholecalciferol 50 MCG ( UT) TABS Take 1 tablet by mouth daily    Jon Elizabeth MD   amLODIPine (NORVASC) 5 MG tablet Take 1 tablet by mouth daily 10/7/24   Sangita Negrete APRN - CNP   carbidopa-levodopa (SINEMET)  MG per tablet Take 1 tablet by mouth in the morning and at bedtime Given at 12:00 pm and 20:00    Jon Elizabeth MD   gabapentin (NEURONTIN) 300 MG capsule Take 2 capsules by mouth 3 times daily.    Jon Elizabeth MD   ipratropium 0.5 mg-albuterol 2.5 mg (DUONEB) 0.5-2.5 (3) MG/3ML SOLN nebulizer solution

## 2025-02-17 NOTE — OP NOTE
Operative Note      Patient: Frankie Hernández  YOB: 1945  MRN: [id number]    Date of Procedure: 2/17/2025    Pre-Op Diagnosis Codes:      * Closed left hip fracture, initial encounter ([facility]) [S72.002A]     * Deep vein thrombosis (DVT) of non-extremity vein, unspecified chronicity [I82.90]    Post-Op Diagnosis: Same       Procedure  Us guidance R femoral vein  Placement of IVC filter    Surgeon(s):  Hieu Trujillo MD Kakkasseril, Pratheek S, MD    Assistant:   * No surgical staff found *    Anesthesia: General    Estimated Blood Loss (mL): Minimal    Complications: None    Specimens:   * No specimens in log *    Implants:  Implant Name Type Inv. Item Serial No.  Lot No. LRB No. Used Action   FILTER VASC L50MM [id number] INTRO 7FR L65CM VENA CAVA FEM W - [id number] Vascular filters FILTER VASC L50MM [id number] INTRO 7FR L65CM VENA CAVA FEM W  [employer]- [id number] N/A 1 Implanted         Drains:   [REMOVED] External Urinary Catheter (Removed)   Site Assessment Other (Comment) 02/04/25 0720   Placement Initiated 02/03/25 2000   Securement Method Tape 02/03/25 2000   Perineal Care Yes 02/04/25 0720   Suction 40 mmgHg continuous 02/03/25 2000   Urine Color Yellow 02/03/25 2000   Urine Appearance Clear 02/03/25 2000   Output (mL) 300 mL 02/04/25 0509       Findings:  Infection Present At Time Of Surgery (PATOS) (choose all levels that have infection present):  No infection present  Other Findings:     DESCRIPTION OF OPERATION:    The patient was identified and the procedure confirmed.  The groin regions were prepped and draped in the usual sterile fashion.  General anesthesia was established.      The right femoral vein was identified under US, noted to be patent and was percutaneously entered under US guidance.  A printer was not available to print an image.  The wire was advanced into the inferior vena cava under fluoroscopic guidance.  A small incision was made around the wire.  The dilator

## 2025-02-17 NOTE — H&P
have discussed the case with the resident. I also participated in medical decision making with the resident on the date of service and I agree with all of the pertinent clinical information unless indicated in my editing of the note. I have reviewed and edited the note above based on my findings during my history, exam, and decision making on the same day of service.     My additional thoughts:   He presented to the hospital after a fall at the skilled nursing facility  He was found have a left hip fracture in the ED  Orthopedic surgery input is appreciated-planning for hemiarthroplasty 2/17-Case discussed 2/70  History of DVT/PE-Eliquis on hold but he does have an IVC filter?  Vascular surgery consultation for DVT/PE on Eliquis  Unfortunately patient recently had a stroke which explains the abnormality on the CT head  Monitor blood sugars due to history of diabetes but hold glipizide while n.p.o.    Electronically signed by Ridge Doyle DO on 2/17/2025 at 9:34 AM    I can be reached through [employer].

## 2025-02-17 NOTE — ANESTHESIA POSTPROCEDURE EVALUATION
Department of Anesthesiology  Postprocedure Note    Patient: Frankie Hernández  MRN: [id number]  YOB: 1945  Date of evaluation: 2/17/2025    Procedure Summary       Date: 02/17/25 Room / Location: [address]    Anesthesia Start: 1607 Anesthesia Stop: 1838    Procedures:       LEFT HIP HEMIARTHROPLASTY (Left: Hip)      VENA CAVA FILTER INSERTION (Groin) Diagnosis:       Closed left hip fracture, initial encounter ([facility])      Deep vein thrombosis (DVT) of non-extremity vein, unspecified chronicity      (Closed left hip fracture, initial encounter ([facility]) [S72.002A])      (Deep vein thrombosis (DVT) of non-extremity vein, unspecified chronicity [I82.90])    Surgeons: Hieu Trujillo MD; Marv Patterson MD Responsible Provider: Mat Lopez MD    Anesthesia Type: General ASA Status: 4            Anesthesia Type: General    Thierno Phase I: Thierno Score 10    Thierno Phase II:      Anesthesia Post Evaluation    Patient location during evaluation: bedside  Patient participation: complete - patient cannot participate  Level of consciousness: responsive to light touch  Pain score: 0  Airway patency: patent  Nausea & Vomiting: no nausea and no vomiting  Cardiovascular status: hemodynamically stable  Respiratory status: acceptable, face mask and spontaneous ventilation  Hydration status: euvolemic  Multimodal analgesia pain management approach  Pain management: adequate        No notable events documented.

## 2025-02-17 NOTE — CARE COORDINATION
Met with patient and spouse, Nina, daughter, Pamela about diagnosis and discharge plan of care. Pt admit from [facility] for left hip fracture after mechanical fall at facility. Ortho consult-OR today, vascular consult-plan for vena cava filter prior to surgery. Pt has hx of CVA and parkinson's. Family does not want to return to [place], referral called to [facility]-no beds, [facility]-will follow up if accepted. PCP is Dr Cagle. Will follow-[facility]

## 2025-02-17 NOTE — ACP (ADVANCE CARE PLANNING)
Advance Care Planning   Healthcare Decision Maker:    Primary Decision Maker: Nina Hernández - Spouse - [phone]    Secondary Decision Maker: Pamela Musa - Child - [phone]    Secondary Decision Maker (Active): Farzana Vieira - Child - [phone]    Click here to complete Healthcare Decision Makers including selection of the Healthcare Decision Maker Relationship (ie \"Primary\").

## 2025-02-17 NOTE — CONSULTS
Vascular Surgery Consultation Note    Reason for Consult:  DVT, PE hx    HPI : This is a 80 y.o. male admitted on 2/16/2025  3:09 AM with fall.  He was at nursing home and fell out of his wheelchair.  He was noted to have L hip fx and Dr Trujillo plans for repair today.    Patient hx of parkinsons, and cva in 1.2025 with signficant residual deficits of speech and weakness causing difficulty with ambulation.      While admitted for cva he was noted to have L peroneal DVT which was not previously noted on previous us.      He has previous hx of dvt PE.  He had ivc filter placed and removed in 2014 for surgery and more recently in 2024 for L TKA.  His procedures were done by Dr Skelton.  He was felt to be high risk for dvt development and pe while off anticoagulation.    Vascular surgery is consulted in regards to hx of dvt, pe.      Unable to obtain full ros.      Past Medical History:   Diagnosis Date    Acute left lumbar radiculopathy 03/21/2021    YUDY (acute kidney injury) ([facility]) 11/29/2013    Anxiety and depression     Cerebral atrophy ([facility])     Disc displacement, lumbar     Hx of deep venous thrombosis 2013    Kidney disease     Parkinson disease ([facility]) 11/29/2013    follows with Dr Dawn    Pneumonia     Pulmonary embolism ([facility]) 11/29/2013    Renal carcinoma ([facility]) 11/29/2013    s/p R nephrectomy    Rhinovirus infection 03/20/2021    3/2021    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 11/27/2013    [facility]; REMOVAL 2/24/2014    Type 2 diabetes mellitus ([facility])         Past Surgical History:   Procedure Laterality Date    CATARACT REMOVAL      CERVICAL SPINE SURGERY      ENDOSCOPY, COLON, DIAGNOSTIC      EYE SURGERY      INVASIVE VASCULAR N/A 03/20/2024    Vena cava filter insertion - cath lab performed by Rafal Skelton MD at [facility] CARDIAC CATH LAB    INVASIVE VASCULAR N/A 06/12/2024    Remove vena cava filter performed by Rafal Skelton MD at [facility] CARDIAC CATH LAB    IVC FILTER INSERTION  11/27/2013

## 2025-02-17 NOTE — BRIEF OP NOTE
Brief Postoperative Note      Patient: Frankie Hernández  YOB: 1945  MRN: [id number]    Date of Procedure: 2/17/2025    Pre-Op Diagnosis Codes:      * Closed left hip fracture, initial encounter ([facility]) [S72.002A]     * Deep vein thrombosis (DVT) of non-extremity vein, unspecified chronicity [I82.90]    Post-Op Diagnosis: Same       Procedure(s):  LEFT HIP HEMIARTHROPLASTY  VENA CAVA FILTER INSERTION    Surgeon(s):  Hieu Trujillo MD Kakkasseril, Pratheek S, MD    Assistant:  Surgical Assistant: Umesh Au RN; Cathy Jacobson    Anesthesia: General    Estimated Blood Loss (mL): 200     Complications: None    Specimens:   ID Type Source Tests Collected by Time Destination   A : LEFT FEMORAL HEAD Bone Bone SURGICAL PATHOLOGY Hieu Trujillo MD 2/17/2025 1728        Implants:  Implant Name Type Inv. Item Serial No.  Lot No. LRB No. Used Action   FILTER VASC L50MM [id number] INTRO 7FR L65CM VENA CAVA FEM W - [id number] Vascular filters FILTER VASC L50MM [id number] INTRO 7FR L65CM VENA CAVA FEM W  [employer]- [id number] N/A 1 Implanted   STEM FEM UNIV 0+ 28 MM HIP COCR - [id number]  STEM FEM UNIV 0+ 28 MM HIP COCR  [employer]- [id number]  1 Implanted   SHELL BPLR OD54MM ID28MM HIP CO CHROM RINGLOC - [id number]  SHELL BPLR OD54MM ID28MM HIP CO CHROM RINGLOC  [employer]- [id number]  1 Implanted   Avenir Complete High Offset Collared Size 6 - [id number]  Avenir Complete High Offset Collared Size 6  [employer]- [id number]  1 Implanted         Drains:   [REMOVED] External Urinary Catheter (Removed)   Site Assessment Other (Comment) 02/04/25 0720   Placement Initiated 02/03/25 2000   Securement Method Tape 02/03/25 2000   Perineal Care Yes 02/04/25 0720   Suction 40 mmgHg continuous 02/03/25 2000   Urine Color Yellow 02/03/25 2000   Urine Appearance Clear 02/03/25 2000   Output (mL) 300 mL 02/04/25 0509       Findings:  Infection Present At Time

## 2025-02-18 ENCOUNTER — APPOINTMENT (OUTPATIENT)
Dept: GENERAL RADIOLOGY | Age: 80
DRG: 521 | End: 2025-02-18
Payer: MEDICARE

## 2025-02-18 LAB
B.E.: -1.7 MMOL/L (ref -3–3)
COHB: 0.8 % (ref 0–1.5)
CRITICAL: ABNORMAL
DATE ANALYZED: ABNORMAL
DATE OF COLLECTION: ABNORMAL
GLUCOSE BLD-MCNC: 139 MG/DL (ref 74–99)
GLUCOSE BLD-MCNC: 174 MG/DL (ref 74–99)
GLUCOSE BLD-MCNC: 179 MG/DL (ref 74–99)
HCO3: 25.5 MMOL/L (ref 22–26)
HHB: 7.9 % (ref 0–5)
LAB: ABNORMAL
Lab: 1310
METHB: 0.3 % (ref 0–1.5)
MICROORGANISM SPEC CULT: NORMAL
MODE: ABNORMAL
O2 CONTENT: 16 ML/DL
O2 SATURATION: 92 % (ref 92–98.5)
O2HB: 91 % (ref 94–97)
OPERATOR ID: 3186
PATIENT TEMP: 37 C
PCO2: 54.3 MMHG (ref 35–45)
PH BLOOD GAS: 7.29 (ref 7.35–7.45)
PO2: 64.1 MMHG (ref 75–100)
SOURCE, BLOOD GAS: ABNORMAL
SPECIMEN DESCRIPTION: NORMAL
THB: 12.5 G/DL (ref 11.5–16.5)
TIME ANALYZED: 1321

## 2025-02-18 PROCEDURE — 2580000003 HC RX 258: Performed by: ORTHOPAEDIC SURGERY

## 2025-02-18 PROCEDURE — 6370000000 HC RX 637 (ALT 250 FOR IP): Performed by: ORTHOPAEDIC SURGERY

## 2025-02-18 PROCEDURE — 82962 GLUCOSE BLOOD TEST: CPT

## 2025-02-18 PROCEDURE — 6360000002 HC RX W HCPCS: Performed by: ORTHOPAEDIC SURGERY

## 2025-02-18 PROCEDURE — 1200000000 HC SEMI PRIVATE

## 2025-02-18 PROCEDURE — 94640 AIRWAY INHALATION TREATMENT: CPT

## 2025-02-18 PROCEDURE — 97161 PT EVAL LOW COMPLEX 20 MIN: CPT

## 2025-02-18 PROCEDURE — 97165 OT EVAL LOW COMPLEX 30 MIN: CPT

## 2025-02-18 PROCEDURE — 2700000000 HC OXYGEN THERAPY PER DAY

## 2025-02-18 PROCEDURE — 82805 BLOOD GASES W/O2 SATURATION: CPT

## 2025-02-18 PROCEDURE — 99222 1ST HOSP IP/OBS MODERATE 55: CPT | Performed by: INTERNAL MEDICINE

## 2025-02-18 PROCEDURE — 71045 X-RAY EXAM CHEST 1 VIEW: CPT

## 2025-02-18 PROCEDURE — 6360000002 HC RX W HCPCS: Performed by: INTERNAL MEDICINE

## 2025-02-18 PROCEDURE — 36600 WITHDRAWAL OF ARTERIAL BLOOD: CPT

## 2025-02-18 PROCEDURE — 2500000003 HC RX 250 WO HCPCS: Performed by: ORTHOPAEDIC SURGERY

## 2025-02-18 RX ORDER — FUROSEMIDE 10 MG/ML
40 INJECTION INTRAMUSCULAR; INTRAVENOUS ONCE
Status: COMPLETED | OUTPATIENT
Start: 2025-02-18 | End: 2025-02-18

## 2025-02-18 RX ORDER — HEPARIN SODIUM 10000 [USP'U]/100ML
5-30 INJECTION, SOLUTION INTRAVENOUS CONTINUOUS
Status: DISCONTINUED | OUTPATIENT
Start: 2025-02-18 | End: 2025-02-20

## 2025-02-18 RX ORDER — HEPARIN SODIUM 1000 [USP'U]/ML
80 INJECTION, SOLUTION INTRAVENOUS; SUBCUTANEOUS ONCE
Status: COMPLETED | OUTPATIENT
Start: 2025-02-18 | End: 2025-02-19

## 2025-02-18 RX ORDER — HEPARIN SODIUM 1000 [USP'U]/ML
80 INJECTION, SOLUTION INTRAVENOUS; SUBCUTANEOUS PRN
Status: DISCONTINUED | OUTPATIENT
Start: 2025-02-18 | End: 2025-02-20

## 2025-02-18 RX ORDER — HEPARIN SODIUM 1000 [USP'U]/ML
40 INJECTION, SOLUTION INTRAVENOUS; SUBCUTANEOUS PRN
Status: DISCONTINUED | OUTPATIENT
Start: 2025-02-18 | End: 2025-02-20

## 2025-02-18 RX ORDER — ZIPRASIDONE MESYLATE 20 MG/ML
10 INJECTION, POWDER, LYOPHILIZED, FOR SOLUTION INTRAMUSCULAR EVERY 6 HOURS PRN
Status: DISCONTINUED | OUTPATIENT
Start: 2025-02-18 | End: 2025-02-20

## 2025-02-18 RX ADMIN — SODIUM CHLORIDE: 9 INJECTION, SOLUTION INTRAVENOUS at 10:26

## 2025-02-18 RX ADMIN — MORPHINE SULFATE 2 MG: 2 INJECTION, SOLUTION INTRAMUSCULAR; INTRAVENOUS at 02:02

## 2025-02-18 RX ADMIN — WATER 3000 MG: 1 INJECTION INTRAMUSCULAR; INTRAVENOUS; SUBCUTANEOUS at 00:23

## 2025-02-18 RX ADMIN — IPRATROPIUM BROMIDE AND ALBUTEROL SULFATE 1 DOSE: 2.5; .5 SOLUTION RESPIRATORY (INHALATION) at 13:06

## 2025-02-18 RX ADMIN — WATER 3000 MG: 1 INJECTION INTRAMUSCULAR; INTRAVENOUS; SUBCUTANEOUS at 10:09

## 2025-02-18 RX ADMIN — SODIUM CHLORIDE, PRESERVATIVE FREE 20 MG: 5 INJECTION INTRAVENOUS at 10:11

## 2025-02-18 RX ADMIN — CARBIDOPA AND LEVODOPA 2 TABLET: 25; 100 TABLET ORAL at 17:06

## 2025-02-18 RX ADMIN — FUROSEMIDE 40 MG: 10 INJECTION, SOLUTION INTRAMUSCULAR; INTRAVENOUS at 18:28

## 2025-02-18 ASSESSMENT — PAIN DESCRIPTION - DESCRIPTORS: DESCRIPTORS: DISCOMFORT

## 2025-02-18 ASSESSMENT — PAIN DESCRIPTION - ORIENTATION: ORIENTATION: LEFT

## 2025-02-18 ASSESSMENT — PAIN DESCRIPTION - LOCATION: LOCATION: HIP

## 2025-02-18 NOTE — OP NOTE
[place] [address]                            OPERATIVE REPORT      PATIENT NAME: CLEVE SALAZAR             : 1945  MED REC NO: [id number]                        ROOM: [facility]  ACCOUNT NO: [id number]                       ADMIT DATE: 2025  PROVIDER: Hieu Trujillo MD      DATE OF PROCEDURE:  2025    SURGEON:  Hieu Trujillo MD    PREOPERATIVE DIAGNOSIS:  Displaced left hip femoral neck fracture.    POSTOPERATIVE DIAGNOSIS:  Displaced left hip femoral neck fracture.    PROCEDURE:  Left hip hemiarthroplasty.    ASSISTANT:  None.    ANESTHESIA:  General.    ESTIMATED BLOOD LOSS:  200.    SPECIMEN:  Bone, left femoral head and neck.    COMPLICATIONS:  None.    CONDITION:  Stable to recovery room.    IMPLANTS:  Bobbi Avenir size 6 high-offset collared femoral stem with a 54 mm bipolar shell and a 28 mm standard cobalt schrome femoral head.    INDICATIONS:  The patient is an 80-year-old gentleman, after a slip and fall, had a displaced left hip femoral neck fracture.  The patient with advanced Parkinson disease and recently had an ischemic CVA in the end of the January.  Also found to have a left lower extremity DVT.  He has been maintained on Eliquis.  The risks, benefits, alternatives, and limitations were discussed, and informed consent obtained.    DESCRIPTION OF PROCEDURE:  The patient was met in preoperative holding area.  Left hip was marked as the correct operative site.  He was taken to the operating room where general anesthesia was administered.  Intravenous antibiotics were given as  surgical prophylaxis.  The procedure initiated with placement of an IVC  by the vascular surgery service - see separate operative note . He was then positioned on his right side, secured on a pegboard, and an axillary roll was placed.  All bony prominences were well padded.  Left lower extremity was prepped and draped

## 2025-02-18 NOTE — CONSULTS
Pulmonary Critical Care Medicine      PULMONARY CRITICAL CARE CONSULTATION NOTE.    02/18/25    CONSULTING  PHYSICIAN: Ridge Doyle DO     Assessment / Recommendations-   Acute respiratory insufficiency and impending failure secondary to multifactorial etiologies-      Traumatic fracture of the 10th rib with possible underlying pulmonary contusion and subsegmental atelectasis    Left chest wall hematoma secondary to the fall    Poor cough and airway secretion clearance leading to aspiration pneumonia/pneumonitis  Senility, deconditioning and debility  Recent history of CVA leading to dysphagia and motor weakness as well as altered mental status    History of Parkinson disease    Left lower extremity DVT, acute occlusive thrombus in the left peroneal vein    Displaced left hip femoral neck fracture status post left hip hemiarthroplasty    -Orthopedic surgery following  -Continue supplemental oxygen and wean as tolerated, currently patient is on 6 L nasal cannula  -Aspiration precautions and NT suction if needed, patient has very poor cough and altered mental status  -Get a blood gas  -Keep patient n.p.o.  -I have personally discussed the case with the family and answered their questions.  He has a high probability of decompensation secondary to poor airway clearance.  -Vascular surgery has been consulted for IVC filter placement and will likely be a permanent filter      History of Present Illness    Frankie Hernández  is a 80 y.o. former cigar smoker  male who was brought to the emergency room after a fall.  He fell on his left side inflicting left hip fracture, left 10th rib fracture as well as left-sided chest hematoma as well as possible pulmonary contusion..  Patient has recent history of cerebrovascular accident leading to weakness, altered mentation and dysphagia.  Orthopedic surgery was consulted for the hip fracture.  He underwent left hemiarthroplasty.  He was found to have left-sided

## 2025-02-18 NOTE — DISCHARGE INSTR - COC
Continuity of Care Form    Patient Name: Frankie Hernández   :  1945  MRN:  [id number]    Admit date:  2025  Discharge date:  ***    Code Status Order: DNR-CCA   Advance Directives:   Advance Care Flowsheet Documentation        Date/Time Healthcare Directive Type of Healthcare Directive Copy in Chart Healthcare Agent Appointed Healthcare Agent's Name Healthcare Agent's Phone Number    25 1532 Yes, patient has an advance directive for healthcare treatment  Durable power of  for health care;Health care treatment directive;Living will  Yes, copy in chart  Spouse  --  --                     Admitting Physician:  Ridge Doyle DO  PCP: Minor Cagle MD    Discharging Nurse: ***  Discharging Hospital Unit/Room#: 0743/0743-A  Discharging Unit Phone Number: ***    Emergency Contact:   Extended Emergency Contact Information  Primary Emergency Contact: Nina Hernández  Address: [address] [place] [address]  Home Phone: [phone]  Mobile Phone: [phone]  Relation: Spouse   needed? No  Secondary Emergency Contact: Pamela Musa  Home Phone: [phone]  Mobile Phone: [phone]  Relation: Child  Preferred language: English   needed? No    Past Surgical History:  Past Surgical History:   Procedure Laterality Date    CATARACT REMOVAL      CERVICAL SPINE SURGERY      ENDOSCOPY, COLON, DIAGNOSTIC      EYE SURGERY      HIP SURGERY Left 2025    LEFT HIP HEMIARTHROPLASTY performed by Hieu Trujillo MD at [facility] OR    INVASIVE VASCULAR N/A 2024    Vena cava filter insertion - cath lab performed by Rafal Skelton MD at [facility]ARDIAC CATH LAB    INVASIVE VASCULAR N/A 2024    Remove vena cava filter performed by Rafal Skelton MD at [facility]ARDIAC CATH LAB    [facility]C FILTER INSERTION  2013    [facility]    [facility]C FILTER REMOVAL  2014    [facility]    KNEE SURGERY Left     TOTAL NEPHRECTOMY Right 2013    Renal cell

## 2025-02-19 LAB
ALBUMIN SERPL-MCNC: 2.9 G/DL (ref 3.5–5.2)
ALP SERPL-CCNC: 67 U/L (ref 40–129)
ALT SERPL-CCNC: 7 U/L (ref 0–40)
ANION GAP SERPL CALCULATED.3IONS-SCNC: 10 MMOL/L (ref 7–16)
AST SERPL-CCNC: 21 U/L (ref 0–39)
BASOPHILS # BLD: 0.03 K/UL (ref 0–0.2)
BASOPHILS NFR BLD: 0 % (ref 0–2)
BILIRUB SERPL-MCNC: 0.4 MG/DL (ref 0–1.2)
BUN SERPL-MCNC: 43 MG/DL (ref 6–23)
CALCIUM SERPL-MCNC: 8.6 MG/DL (ref 8.6–10.2)
CHLORIDE SERPL-SCNC: 108 MMOL/L (ref 98–107)
CO2 SERPL-SCNC: 25 MMOL/L (ref 22–29)
CREAT SERPL-MCNC: 1.7 MG/DL (ref 0.7–1.2)
EOSINOPHIL # BLD: 0 K/UL (ref 0.05–0.5)
EOSINOPHILS RELATIVE PERCENT: 0 % (ref 0–6)
ERYTHROCYTE [DISTWIDTH] IN BLOOD BY AUTOMATED COUNT: 12.6 % (ref 11.5–15)
ERYTHROCYTE [DISTWIDTH] IN BLOOD BY AUTOMATED COUNT: 12.9 % (ref 11.5–15)
GFR, ESTIMATED: 39 ML/MIN/1.73M2
GLUCOSE BLD-MCNC: 135 MG/DL (ref 74–99)
GLUCOSE BLD-MCNC: 148 MG/DL (ref 74–99)
GLUCOSE BLD-MCNC: 159 MG/DL (ref 74–99)
GLUCOSE BLD-MCNC: 169 MG/DL (ref 74–99)
GLUCOSE SERPL-MCNC: 150 MG/DL (ref 74–99)
HCT VFR BLD AUTO: 35.1 % (ref 37–54)
HCT VFR BLD AUTO: 35.6 % (ref 37–54)
HGB BLD-MCNC: 10.9 G/DL (ref 12.5–16.5)
HGB BLD-MCNC: 11.2 G/DL (ref 12.5–16.5)
IMM GRANULOCYTES # BLD AUTO: 0.2 K/UL (ref 0–0.58)
IMM GRANULOCYTES NFR BLD: 2 % (ref 0–5)
LYMPHOCYTES NFR BLD: 0.89 K/UL (ref 1.5–4)
LYMPHOCYTES RELATIVE PERCENT: 7 % (ref 20–42)
MCH RBC QN AUTO: 31.9 PG (ref 26–35)
MCH RBC QN AUTO: 32.2 PG (ref 26–35)
MCHC RBC AUTO-ENTMCNC: 31.1 G/DL (ref 32–34.5)
MCHC RBC AUTO-ENTMCNC: 31.5 G/DL (ref 32–34.5)
MCV RBC AUTO: 102.3 FL (ref 80–99.9)
MCV RBC AUTO: 102.6 FL (ref 80–99.9)
MONOCYTES NFR BLD: 1.34 K/UL (ref 0.1–0.95)
MONOCYTES NFR BLD: 11 % (ref 2–12)
NEUTROPHILS NFR BLD: 81 % (ref 43–80)
NEUTS SEG NFR BLD: 10.15 K/UL (ref 1.8–7.3)
PARTIAL THROMBOPLASTIN TIME: 23.3 SEC (ref 24.5–35.1)
PARTIAL THROMBOPLASTIN TIME: 83.6 SEC (ref 24.5–35.1)
PARTIAL THROMBOPLASTIN TIME: >240 SEC (ref 24.5–35.1)
PLATELET # BLD AUTO: 130 K/UL (ref 130–450)
PLATELET # BLD AUTO: 142 K/UL (ref 130–450)
PMV BLD AUTO: 10.3 FL (ref 7–12)
PMV BLD AUTO: 9.8 FL (ref 7–12)
POTASSIUM SERPL-SCNC: 4.5 MMOL/L (ref 3.5–5)
PROT SERPL-MCNC: 6 G/DL (ref 6.4–8.3)
RBC # BLD AUTO: 3.42 M/UL (ref 3.8–5.8)
RBC # BLD AUTO: 3.48 M/UL (ref 3.8–5.8)
SODIUM SERPL-SCNC: 143 MMOL/L (ref 132–146)
WBC OTHER # BLD: 11.9 K/UL (ref 4.5–11.5)
WBC OTHER # BLD: 12.6 K/UL (ref 4.5–11.5)

## 2025-02-19 PROCEDURE — 6360000002 HC RX W HCPCS: Performed by: INTERNAL MEDICINE

## 2025-02-19 PROCEDURE — 80053 COMPREHEN METABOLIC PANEL: CPT

## 2025-02-19 PROCEDURE — 2580000003 HC RX 258: Performed by: NURSE PRACTITIONER

## 2025-02-19 PROCEDURE — 5A0935A ASSISTANCE WITH RESPIRATORY VENTILATION, LESS THAN 24 CONSECUTIVE HOURS, HIGH NASAL FLOW/VELOCITY: ICD-10-PCS

## 2025-02-19 PROCEDURE — 2500000003 HC RX 250 WO HCPCS: Performed by: ORTHOPAEDIC SURGERY

## 2025-02-19 PROCEDURE — 2580000003 HC RX 258: Performed by: ORTHOPAEDIC SURGERY

## 2025-02-19 PROCEDURE — 85025 COMPLETE CBC W/AUTO DIFF WBC: CPT

## 2025-02-19 PROCEDURE — 99233 SBSQ HOSP IP/OBS HIGH 50: CPT | Performed by: INTERNAL MEDICINE

## 2025-02-19 PROCEDURE — 2500000003 HC RX 250 WO HCPCS: Performed by: NURSE PRACTITIONER

## 2025-02-19 PROCEDURE — 1200000000 HC SEMI PRIVATE

## 2025-02-19 PROCEDURE — 82962 GLUCOSE BLOOD TEST: CPT

## 2025-02-19 PROCEDURE — 36415 COLL VENOUS BLD VENIPUNCTURE: CPT

## 2025-02-19 PROCEDURE — 6360000002 HC RX W HCPCS: Performed by: NURSE PRACTITIONER

## 2025-02-19 PROCEDURE — 97110 THERAPEUTIC EXERCISES: CPT

## 2025-02-19 PROCEDURE — 85027 COMPLETE CBC AUTOMATED: CPT

## 2025-02-19 PROCEDURE — 92610 EVALUATE SWALLOWING FUNCTION: CPT

## 2025-02-19 PROCEDURE — 85730 THROMBOPLASTIN TIME PARTIAL: CPT

## 2025-02-19 PROCEDURE — 2700000000 HC OXYGEN THERAPY PER DAY

## 2025-02-19 RX ORDER — FAMOTIDINE 20 MG/1
20 TABLET, FILM COATED ORAL DAILY
Status: DISCONTINUED | OUTPATIENT
Start: 2025-02-20 | End: 2025-02-20

## 2025-02-19 RX ORDER — OXYMETAZOLINE HYDROCHLORIDE 0.05 G/100ML
2 SPRAY NASAL ONCE
Status: DISCONTINUED | OUTPATIENT
Start: 2025-02-19 | End: 2025-02-20 | Stop reason: HOSPADM

## 2025-02-19 RX ORDER — LIDOCAINE HYDROCHLORIDE 20 MG/ML
JELLY TOPICAL ONCE
Status: DISCONTINUED | OUTPATIENT
Start: 2025-02-19 | End: 2025-02-20 | Stop reason: HOSPADM

## 2025-02-19 RX ORDER — OXYMETAZOLINE HYDROCHLORIDE 0.05 G/100ML
2 SPRAY NASAL ONCE
Status: DISCONTINUED | OUTPATIENT
Start: 2025-02-19 | End: 2025-02-19

## 2025-02-19 RX ORDER — FENTANYL CITRATE 50 UG/ML
25 INJECTION, SOLUTION INTRAMUSCULAR; INTRAVENOUS
Status: DISCONTINUED | OUTPATIENT
Start: 2025-02-19 | End: 2025-02-20 | Stop reason: HOSPADM

## 2025-02-19 RX ORDER — LIDOCAINE HYDROCHLORIDE 20 MG/ML
JELLY TOPICAL ONCE
Status: DISCONTINUED | OUTPATIENT
Start: 2025-02-19 | End: 2025-02-19 | Stop reason: SDUPTHER

## 2025-02-19 RX ADMIN — ZIPRASIDONE MESYLATE 10 MG: 20 INJECTION, POWDER, LYOPHILIZED, FOR SOLUTION INTRAMUSCULAR at 21:23

## 2025-02-19 RX ADMIN — HEPARIN SODIUM 11 UNITS/KG/HR: 10000 INJECTION, SOLUTION INTRAVENOUS at 18:40

## 2025-02-19 RX ADMIN — HEPARIN SODIUM 10000 UNITS: 1000 INJECTION INTRAVENOUS; SUBCUTANEOUS at 02:22

## 2025-02-19 RX ADMIN — FENTANYL CITRATE 25 MCG: 50 INJECTION INTRAMUSCULAR; INTRAVENOUS at 11:48

## 2025-02-19 RX ADMIN — FENTANYL CITRATE 25 MCG: 50 INJECTION INTRAMUSCULAR; INTRAVENOUS at 15:26

## 2025-02-19 RX ADMIN — SODIUM CHLORIDE, PRESERVATIVE FREE 20 MG: 5 INJECTION INTRAVENOUS at 08:57

## 2025-02-19 RX ADMIN — SODIUM CHLORIDE, PRESERVATIVE FREE 10 ML: 5 INJECTION INTRAVENOUS at 09:01

## 2025-02-19 RX ADMIN — FENTANYL CITRATE 25 MCG: 50 INJECTION INTRAMUSCULAR; INTRAVENOUS at 09:23

## 2025-02-19 RX ADMIN — FENTANYL CITRATE 25 MCG: 50 INJECTION INTRAMUSCULAR; INTRAVENOUS at 18:26

## 2025-02-19 RX ADMIN — ZIPRASIDONE MESYLATE 10 MG: 20 INJECTION, POWDER, LYOPHILIZED, FOR SOLUTION INTRAMUSCULAR at 00:14

## 2025-02-19 RX ADMIN — ZIPRASIDONE MESYLATE 10 MG: 20 INJECTION, POWDER, LYOPHILIZED, FOR SOLUTION INTRAMUSCULAR at 12:33

## 2025-02-19 RX ADMIN — HEPARIN SODIUM 16 UNITS/KG/HR: 10000 INJECTION, SOLUTION INTRAVENOUS at 02:30

## 2025-02-19 RX ADMIN — SODIUM CHLORIDE 500 MG: 9 INJECTION, SOLUTION INTRAVENOUS at 18:38

## 2025-02-19 ASSESSMENT — PAIN DESCRIPTION - ORIENTATION
ORIENTATION: LEFT
ORIENTATION: RIGHT
ORIENTATION: RIGHT

## 2025-02-19 ASSESSMENT — PAIN DESCRIPTION - LOCATION
LOCATION: HIP

## 2025-02-19 ASSESSMENT — PAIN SCALES - WONG BAKER
WONGBAKER_NUMERICALRESPONSE: HURTS A LITTLE BIT

## 2025-02-19 ASSESSMENT — PAIN SCALES - GENERAL
PAINLEVEL_OUTOF10: 4
PAINLEVEL_OUTOF10: 0
PAINLEVEL_OUTOF10: 4
PAINLEVEL_OUTOF10: 7

## 2025-02-19 NOTE — CARE COORDINATION
Updated plan of care. POD#2 left hip blas and also IVC filter. Remains on 6L/NC. Pending therapies. Plan is [facility] upon discharge. 7000 and ambulance form done, LINNETTE initiated. No pre-cert needed, self-pay. Will follow-mjo

## 2025-02-19 NOTE — CONSULTS
GENERAL SURGERY  CONSULT NOTE    Patient's Name/Date of Birth: Frankie Hernández / 1945    Date: February 19, 2025     PCP: Minor Cagle MD     Chief Complaint:   Chief Complaint   Patient presents with    Fall     From [facility], per EMS pt was standing up from wheelchair and fell, witnessed by nurses, on eliquis, hx of CVA two weeks ago       Physician Consulted: Dr. Das  Reason for Consult: NGT placement     HPI:  Frankie Hernández is a 80 y.o. male with history of Parkinson's and recent CVA who presented to ED 3 days ago following a fall.  He had a left hip fracture which is status post repair 2/18.  He also underwent IVC filter at that time as well for left lower extremity DVT.  He is currently on a heparin drip.  The patient has residual dysphagia and mobility impairments from his CVA in January.  Nursing staff has been unable to successfully place a nasogastric tube due to significant nasal bleeding.  APTT today is greater than 240.     Past Medical History:   Diagnosis Date    Acute left lumbar radiculopathy 03/21/2021    YUDY (acute kidney injury) 11/29/2013    Anxiety and depression     Cerebral atrophy     Disc displacement, lumbar     Hx of deep venous thrombosis 2013    Kidney disease     Parkinson disease ([facility]) 11/29/2013    follows with Dr Dawn    Pneumonia     Pulmonary embolism ([facility]) 11/29/2013    Renal carcinoma ([facility]) 11/29/2013    s/p R nephrectomy    Rhinovirus infection 03/20/2021    3/2021    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 11/27/2013    [facility]; REMOVAL 2/24/2014    Type 2 diabetes mellitus ([facility])        Past Surgical History:   Procedure Laterality Date    CATARACT REMOVAL      CERVICAL SPINE SURGERY      ENDOSCOPY, COLON, DIAGNOSTIC      EYE SURGERY      HIP SURGERY Left 2/17/2025    LEFT HIP HEMIARTHROPLASTY performed by Hieu Trujillo MD at [facility] OR    INVASIVE VASCULAR N/A 03/20/2024    Vena cava filter insertion - cath lab performed by Rafal Skelton MD

## 2025-02-19 NOTE — CARE COORDINATION
I was called by nursing because patient is strict NPO. Patient's eliquis was reordered to restart tonight but patient is strict NPO per pulm for risk of aspiration. Patient had IVC filter placed but upon chart review and Dr. Doyle's note from today it states patient will still need eliquis due to recent stroke. Since patient is strict NPO, will start heparin gtt for now. Nursing also states patient is very agitated and removing his O2 and desaturates into the 80s. Order placed for geodon prn.

## 2025-02-20 VITALS
BODY MASS INDEX: 38.46 KG/M2 | SYSTOLIC BLOOD PRESSURE: 126 MMHG | OXYGEN SATURATION: 93 % | HEART RATE: 102 BPM | TEMPERATURE: 98.8 F | DIASTOLIC BLOOD PRESSURE: 63 MMHG | HEIGHT: 71 IN | RESPIRATION RATE: 20 BRPM | WEIGHT: 274.69 LBS

## 2025-02-20 LAB
ALBUMIN SERPL-MCNC: 2.9 G/DL (ref 3.5–5.2)
ALP SERPL-CCNC: 73 U/L (ref 40–129)
ALT SERPL-CCNC: 13 U/L (ref 0–40)
ANION GAP SERPL CALCULATED.3IONS-SCNC: 9 MMOL/L (ref 7–16)
AST SERPL-CCNC: 28 U/L (ref 0–39)
BASOPHILS # BLD: 0.08 K/UL (ref 0–0.2)
BASOPHILS NFR BLD: 1 % (ref 0–2)
BILIRUB SERPL-MCNC: 0.5 MG/DL (ref 0–1.2)
BUN SERPL-MCNC: 35 MG/DL (ref 6–23)
CALCIUM SERPL-MCNC: 8.7 MG/DL (ref 8.6–10.2)
CHLORIDE SERPL-SCNC: 112 MMOL/L (ref 98–107)
CO2 SERPL-SCNC: 27 MMOL/L (ref 22–29)
CREAT SERPL-MCNC: 1.3 MG/DL (ref 0.7–1.2)
EOSINOPHIL # BLD: 0.09 K/UL (ref 0.05–0.5)
EOSINOPHILS RELATIVE PERCENT: 1 % (ref 0–6)
ERYTHROCYTE [DISTWIDTH] IN BLOOD BY AUTOMATED COUNT: 12.9 % (ref 11.5–15)
GFR, ESTIMATED: 57 ML/MIN/1.73M2
GLUCOSE BLD-MCNC: 185 MG/DL (ref 74–99)
GLUCOSE SERPL-MCNC: 170 MG/DL (ref 74–99)
HCT VFR BLD AUTO: 35.5 % (ref 37–54)
HGB BLD-MCNC: 10.8 G/DL (ref 12.5–16.5)
IMM GRANULOCYTES # BLD AUTO: 0.53 K/UL (ref 0–0.58)
IMM GRANULOCYTES NFR BLD: 5 % (ref 0–5)
LYMPHOCYTES NFR BLD: 0.89 K/UL (ref 1.5–4)
LYMPHOCYTES RELATIVE PERCENT: 8 % (ref 20–42)
MCH RBC QN AUTO: 31.6 PG (ref 26–35)
MCHC RBC AUTO-ENTMCNC: 30.4 G/DL (ref 32–34.5)
MCV RBC AUTO: 103.8 FL (ref 80–99.9)
MONOCYTES NFR BLD: 1.43 K/UL (ref 0.1–0.95)
MONOCYTES NFR BLD: 13 % (ref 2–12)
NEUTROPHILS NFR BLD: 72 % (ref 43–80)
NEUTS SEG NFR BLD: 7.82 K/UL (ref 1.8–7.3)
PARTIAL THROMBOPLASTIN TIME: 55.9 SEC (ref 24.5–35.1)
PARTIAL THROMBOPLASTIN TIME: 59.8 SEC (ref 24.5–35.1)
PLATELET # BLD AUTO: 163 K/UL (ref 130–450)
PMV BLD AUTO: 10.1 FL (ref 7–12)
POTASSIUM SERPL-SCNC: 4.9 MMOL/L (ref 3.5–5)
PROT SERPL-MCNC: 5.9 G/DL (ref 6.4–8.3)
RBC # BLD AUTO: 3.42 M/UL (ref 3.8–5.8)
SODIUM SERPL-SCNC: 148 MMOL/L (ref 132–146)
WBC OTHER # BLD: 10.8 K/UL (ref 4.5–11.5)

## 2025-02-20 PROCEDURE — 2500000003 HC RX 250 WO HCPCS: Performed by: NURSE PRACTITIONER

## 2025-02-20 PROCEDURE — 6370000000 HC RX 637 (ALT 250 FOR IP): Performed by: ORTHOPAEDIC SURGERY

## 2025-02-20 PROCEDURE — 6360000002 HC RX W HCPCS: Performed by: NURSE PRACTITIONER

## 2025-02-20 PROCEDURE — 99223 1ST HOSP IP/OBS HIGH 75: CPT | Performed by: SURGERY

## 2025-02-20 PROCEDURE — 2580000003 HC RX 258: Performed by: NURSE PRACTITIONER

## 2025-02-20 PROCEDURE — 80053 COMPREHEN METABOLIC PANEL: CPT

## 2025-02-20 PROCEDURE — 36415 COLL VENOUS BLD VENIPUNCTURE: CPT

## 2025-02-20 PROCEDURE — 82962 GLUCOSE BLOOD TEST: CPT

## 2025-02-20 PROCEDURE — 85730 THROMBOPLASTIN TIME PARTIAL: CPT

## 2025-02-20 PROCEDURE — 6370000000 HC RX 637 (ALT 250 FOR IP): Performed by: INTERNAL MEDICINE

## 2025-02-20 PROCEDURE — 6360000002 HC RX W HCPCS: Performed by: INTERNAL MEDICINE

## 2025-02-20 PROCEDURE — 94640 AIRWAY INHALATION TREATMENT: CPT

## 2025-02-20 PROCEDURE — 85025 COMPLETE CBC W/AUTO DIFF WBC: CPT

## 2025-02-20 RX ORDER — GLYCOPYRROLATE 0.2 MG/ML
0.1 INJECTION INTRAMUSCULAR; INTRAVENOUS EVERY 4 HOURS PRN
Status: DISCONTINUED | OUTPATIENT
Start: 2025-02-20 | End: 2025-02-20 | Stop reason: HOSPADM

## 2025-02-20 RX ORDER — MORPHINE SULFATE 10 MG/5ML
5 SOLUTION ORAL
Status: DISCONTINUED | OUTPATIENT
Start: 2025-02-20 | End: 2025-02-20 | Stop reason: HOSPADM

## 2025-02-20 RX ORDER — HALOPERIDOL 5 MG/ML
2 INJECTION INTRAMUSCULAR EVERY 6 HOURS PRN
Status: DISCONTINUED | OUTPATIENT
Start: 2025-02-20 | End: 2025-02-20 | Stop reason: HOSPADM

## 2025-02-20 RX ORDER — LORAZEPAM 2 MG/ML
1 CONCENTRATE ORAL EVERY 8 HOURS PRN
Qty: 30 ML | Refills: 0 | Status: SHIPPED | OUTPATIENT
Start: 2025-02-20 | End: 2025-03-12

## 2025-02-20 RX ORDER — MORPHINE SULFATE 20 MG/ML
5 SOLUTION ORAL
Qty: 30 ML | Refills: 0 | Status: SHIPPED | OUTPATIENT
Start: 2025-02-20 | End: 2025-03-02

## 2025-02-20 RX ORDER — LORAZEPAM 1 MG/1
1 TABLET ORAL EVERY 8 HOURS PRN
Status: DISCONTINUED | OUTPATIENT
Start: 2025-02-20 | End: 2025-02-20 | Stop reason: HOSPADM

## 2025-02-20 RX ORDER — LORAZEPAM 2 MG/ML
0.5 INJECTION INTRAMUSCULAR ONCE
Status: COMPLETED | OUTPATIENT
Start: 2025-02-20 | End: 2025-02-20

## 2025-02-20 RX ADMIN — LORAZEPAM 1 MG: 1 TABLET ORAL at 09:07

## 2025-02-20 RX ADMIN — HALOPERIDOL LACTATE 2 MG: 5 INJECTION, SOLUTION INTRAMUSCULAR at 04:31

## 2025-02-20 RX ADMIN — FENTANYL CITRATE 25 MCG: 50 INJECTION INTRAMUSCULAR; INTRAVENOUS at 06:52

## 2025-02-20 RX ADMIN — LORAZEPAM 0.5 MG: 2 INJECTION INTRAMUSCULAR; INTRAVENOUS at 01:57

## 2025-02-20 RX ADMIN — IPRATROPIUM BROMIDE AND ALBUTEROL SULFATE 1 DOSE: 2.5; .5 SOLUTION RESPIRATORY (INHALATION) at 08:17

## 2025-02-20 RX ADMIN — FENTANYL CITRATE 25 MCG: 50 INJECTION INTRAMUSCULAR; INTRAVENOUS at 10:53

## 2025-02-20 RX ADMIN — SODIUM CHLORIDE 500 MG: 9 INJECTION, SOLUTION INTRAVENOUS at 00:34

## 2025-02-20 RX ADMIN — FENTANYL CITRATE 25 MCG: 50 INJECTION INTRAMUSCULAR; INTRAVENOUS at 00:23

## 2025-02-20 RX ADMIN — INSULIN LISPRO 2 UNITS: 100 INJECTION, SOLUTION INTRAVENOUS; SUBCUTANEOUS at 05:20

## 2025-02-20 RX ADMIN — MORPHINE SULFATE 5 MG: 10 SOLUTION ORAL at 09:37

## 2025-02-20 ASSESSMENT — PAIN - FUNCTIONAL ASSESSMENT
PAIN_FUNCTIONAL_ASSESSMENT: ACTIVITIES ARE NOT PREVENTED
PAIN_FUNCTIONAL_ASSESSMENT: PREVENTS OR INTERFERES SOME ACTIVE ACTIVITIES AND ADLS

## 2025-02-20 ASSESSMENT — PAIN SCALES - GENERAL
PAINLEVEL_OUTOF10: 9
PAINLEVEL_OUTOF10: 7
PAINLEVEL_OUTOF10: 7

## 2025-02-20 NOTE — PROGRESS NOTES
Pulmonary Critical Care Medicine           PULMONARY  CRITICAL CARE   SERVICE DAILY PROGRESS  NOTE     2/19/2025   Hospital LOS:  LOS: 3 days     Impression / Recommendations:    Acute respiratory insufficiency and impending failure secondary to multifactorial etiologies-       Traumatic fracture of the 10th rib with possible underlying pulmonary contusion and subsegmental atelectasis     Left chest wall hematoma secondary to the fall     Poor cough and airway secretion clearance leading to aspiration pneumonia/pneumonitis  Senility, deconditioning and debility  Recent history of CVA leading to dysphagia and motor weakness as well as altered mental status     History of Parkinson disease     Left lower extremity DVT, acute occlusive thrombus in the left peroneal vein     Displaced left hip femoral neck fracture status post left hip hemiarthroplasty     -Clinically improved overall after 1 dose of Lasix  -Upper airway secretions much better after diuretics  -Unsuccessful attempt at NG tube placement, can go ahead with Corpak placement for nutrition and medications  -Continues to be on heparin drip  -Orthopedic surgery following  -Continue supplemental oxygen and wean as tolerated, currently patient is on 6 L nasal cannula  -Aspiration precautions and NT suction if needed, patient has very poor cough and altered mental status  -Get a blood gas  -Keep patient n.p.o.  -I have personally discussed the case with the family and answered their questions.  He has a high probability of decompensation secondary to poor airway clearance.  -Vascular surgery has been consulted for IVC filter placement and will likely be a permanent filter    Interval History/Event Changes:  Much less gurgling today  Overall looks better  Could not place the NG tube, can attempt Corpak today    Allergies  Allergies   Allergen Reactions    Tape [Adhesive Tape] Rash       Review of Systems    A pertinent review of systems was performed and was 
  [facility] Quality Flow/Interdisciplinary Rounds Progress Note        Quality Flow Rounds held on February 17, 2025    Disciplines Attending:  Bedside Nurse, , , Nursing Unit Leadership, and      Frankie Hernández was admitted on 2/16/2025  3:09 AM    Anticipated Discharge Date:   2/20/2025    Disposition: Skilled Nursing Facility     Ray Score:  Ray Scale Score: 15    [facility] RISK OF UNPLANNED READMISSION 2.0             22.3 Total Score        Discussed patient goal for the day, patient clinical progression, and barriers to discharge.  The following Goal(s) of the Day/Commitment(s) have been identified:  Other  RN ensuring there are no nursing care barriers in plans for patient's surgical intervention this afternoon      Tia Faith RN  February 17, 2025        
  [facility] Quality Flow/Interdisciplinary Rounds Progress Note        Quality Flow Rounds held on February 18, 2025    Disciplines Attending:  Bedside Nurse, , , Nursing Unit Leadership, and      Frankie Hannajakub was admitted on 2/16/2025  3:09 AM    Anticipated Discharge Date:   2/20/2025    Disposition: Skilled Nursing Facility    Ray Score:  Ray Scale Score: 15    [facility] RISK OF UNPLANNED READMISSION 2.0             23 Total Score        Discussed patient goal for the day, patient clinical progression, and barriers to discharge.  The following Goal(s) of the Day/Commitment(s) have been identified:  Occupational Therapy - Obtain Consult Order and Physical Therapy - Obtain Consult Order      Tia Faith RN  February 18, 2025        
  SPEECH/LANGUAGE PATHOLOGY  CLINICAL ASSESSMENT OF SWALLOWING FUNCTION   and PLAN OF CARE      PATIENT NAME:  Frankie Hernández  (male)     MRN:  [id number]    :  1945  (80 y.o.)  STATUS:  Inpatient: Room 0743/0743-A    TODAY'S DATE:  2025  ORDER DATE, DESCRIPTION AND REFERRING PROVIDER: Dr. Doyle  REASON FOR REFERRAL: Assess swallow function    EVALUATING THERAPIST: Sophie Yoder, SLP                 RESULTS:    DYSPHAGIA DIAGNOSIS:   Clinical indicators of suspected oropharyngeal phase dysphagia       Patient with decreased alertness and inability to follow directions. Wife at bedside reported patient has been nonverbal since admission. Patient orally accepting of spoon, but unable to elicit a swallow response despite multi modal stimulation. Due to current cognition and inability to elicit a swallow response, recommend patient remain NPO with ongoing assessment by SLP to determine if/when safely able to initiate oral diet.     DIET RECOMMENDATIONS:  NPO with ongoing PO analysis by SLP only to determine if PO diet can be initiated         FEEDING RECOMMENDATIONS:     Assistance level:  Not applicable      Compensatory strategies recommended: Thorough oral care to prevent colonization of oral bacteria       Discussed recommendations with:  patient nurse in person    SPEECH THERAPY  PLAN OF CARE   The dysphagia POC is established based on physician order, dysphagia diagnosis and results of clinical assessment     Skilled SLP intervention for dysphagia management on acute care up to 5 x per week until goals met, pt plateaus in function and/or discharged from hospital    Conditions Requiring Skilled Therapeutic Intervention for dysphagia:    Patient is performing below functional baseline d/t  current acute condition, respiratory compromise, multiple medications, and/or increased dependency upon caregivers.  Incoordination of swallow/breathing pattern due to compromised respiratory system  patient unable 
4 Eyes Skin Assessment     NAME:  Frankie Hernández  YOB: 1945  MEDICAL RECORD NUMBER:  [id number]    The patient is being assessed for  Admission    I agree that at least one RN has performed a thorough Head to Toe Skin Assessment on the patient. ALL assessment sites listed below have been assessed.      Areas assessed by both nurses:    Head, Face, Ears, Shoulders, Back, Chest, Arms, Elbows, Hands, Sacrum. Buttock, Coccyx, Ischium, and Legs. Feet and Heels        Does the Patient have a Wound? Yes wound(s) were present on assessment. LDA wound assessment was Initiated and completed by RN       Ray Prevention initiated by RN: Yes  Wound Care Orders initiated by RN: No    Pressure Injury (Stage 3,4, Unstageable, DTI, NWPT, and Complex wounds) if present, place Wound referral order by RN under : No    New Ostomies, if present place, Ostomy referral order under : No     Nurse 1 eSignature: Electronically signed by Marley Hamilton RN on 2/16/25 at 7:39 AM EST    **SHARE this note so that the co-signing nurse can place an eSignature**    Nurse 2 eSignature: Electronically signed by Jayda Aiken RN on 2/16/25 at 7:52 AM EST  
Call out made to [facility] regarding pts behavior. Despite geodon and pain medicine pt is restless in bed, pulling at oxygen tubing, and IV sites. See new orders.   
Consult for     new 6 liter demand andd CT results of chest   Sent to Dr. Sotelo  
Consult sent to Dr Das/Resident Dr Jose Guadalupe Trammell for NG tube placement.  
Dr. Conrad returned call re:relay patient can have diet as surgical intervention will not be today  
Dr. Das notified surgery consult is no longer needed due to change in patient condition and plan.   
GENERAL SURGERY  DAILY PROGRESS NOTE    Patient's Name/Date of Birth: Frankie Hernández / 1945    Date: 2025     Chief Complaint   Patient presents with    Fall     From [facility], per EMS pt was standing up from wheelchair and fell, witnessed by nurses, on eliquis, hx of CVA two weeks ago        Subjective:  Resting in bed.      Objective:  Last 24Hrs  Temp  Av °F (37.2 °C)  Min: 98.8 °F (37.1 °C)  Max: 99.1 °F (37.3 °C)  Resp  Av.8  Min: 14  Max: 22  Pulse  Av.6  Min: 77  Max: 111  Systolic (24hrs), Av , Min:122 , Max:181     Diastolic (24hrs), Av, Min:60, Max:98    SpO2  Av.4 %  Min: 91 %  Max: 96 %    I/O last 3 completed shifts:  In: -   Out: 2450 [Urine:2450]      General: resting in bed, does not follow commands  Cardiovascular: Warm throughout, no edema  Respiratory: no respiratory distress, equal chest rise on 6L  Abdomen: soft,  nontender, nondistended  Skin: no obvious rashes or lesions appreciated, no jaundice  Extremities: atraumatic      CBC  Recent Labs     25  0058 25  0445 25  0519   WBC 11.9* 12.6* 10.8   RBC 3.42* 3.48* 3.42*   HGB 10.9* 11.2* 10.8*   HCT 35.1* 35.6* 35.5*   .6* 102.3* 103.8*   MCH 31.9 32.2 31.6   MCHC 31.1* 31.5* 30.4*   RDW 12.6 12.9 12.9    142 163   MPV 9.8 10.3 10.1       CMP  Recent Labs     25  0445 25  0519    148*   K 4.5 4.9   * 112*   CO2 25 27   BUN 43* 35*   CREATININE 1.7* 1.3*   GLUCOSE 150* 170*   CALCIUM 8.6 8.7   BILITOT 0.4 0.5   ALKPHOS 67 73   AST 21 28   ALT 7 13         Assessment/Plan:    Patient Active Problem List   Diagnosis    DVT (deep venous thrombosis) (HCC)    Pulmonary embolism (HCC)    Renal carcinoma (HCC)    Anxiety and depression    Parkinson disease (HCC)    CKD (chronic kidney disease) stage 3, GFR 30-59 ml/min    YUDY (acute kidney injury)    Hx of deep venous thrombosis    Dyspnea    History of pulmonary embolism    Rhinovirus infection    
Internal Medicine Progress Note    Patient's name: Frankie Hernández  : 1945  Chief complaints (on day of admission): Fall (From [facility], per EMS pt was standing up from wheelchair and fell, witnessed by nurses, on eliquis, hx of CVA two weeks ago)  Admission date: 2025  Date of service: 2025   Room: [address]  Primary care physician: Minor Cagle MD  Reason for visit: Follow-up for left hip fracture    Subjective  Frankie is seen lying in bed eyes closed, restless this morning. He is not responding to questioning or following commands, seems very uncomfortable. Several family members present at the bedside. He had a very rough night. He was very restless and agitated, unable to redirect. He was given Fentanyl, Ativan, Geodon and Haldol without any improvement. He has had increased hypoxia and now on NRB. Case discussed with family this morning and they would like to proceed with transition to comfort measures. They are asking for him to be transferred to the SNF with [facility] today. Medications discontinued and started on comfort medications per family request. No other issues or concerns from nursing.    Review of Systems  Unable to obtain accurate review of systems given patient's mentation.    Hospital Medications  Current Facility-Administered Medications   Medication Dose Route Frequency Provider Last Rate Last Admin    haloperidol lactate (HALDOL) injection 2 mg  2 mg IntraMUSCular Q6H PRN Stephanie Connor APRN - CNP   2 mg at 25 0431    morphine 10 MG/5ML solution 5 mg  5 mg Oral Q2H PRN Ridge Doyle, DO        LORazepam (ATIVAN) tablet 1 mg  1 mg Oral Q8H PRN Ridge Doyle, DO   1 mg at 25 0907    glycopyrrolate (ROBINUL) injection 0.1 mg  0.1 mg IntraVENous Q4H PRN Ridge Doyle, DO        [Held by provider] valproate (DEPACON) 500 mg in sodium chloride 0.9 % 100 mL IVPB  500 mg IntraVENous Q8H Sangita Negrete APRN - CNP   Stopped at 
Internal Medicine Progress Note    Patient's name: Frankie Hernández  : 1945  Chief complaints (on day of admission): Fall (From [facility], per EMS pt was standing up from wheelchair and fell, witnessed by nurses, on eliquis, hx of CVA two weeks ago)  Admission date: 2025  Date of service: 2025   Room: [address]  Primary care physician: Minor Cagle MD  Reason for visit: Follow-up for left hip fracture    Subjective  Frankie was seen and examined. He is sleeping in bed.  Occasionally reaching for his NC.  Per wife at bedside, patient has been increasingly agitated.  She believes that patient is in pain.  He is currently NPO due to risk for aspiration.  Heparin gtt running.  Patient has 6L of NC in place.  Wife is requesting pain medication at this time.      Review of Systems  Unable to assess due to patient's condition.     Hospital Medications  Current Facility-Administered Medications   Medication Dose Route Frequency Provider Last Rate Last Admin    valproate (DEPACON) 500 mg in sodium chloride 0.9 % 100 mL IVPB  500 mg IntraVENous Q8H Sangita Negrete APRN - CNP        [START ON 2025] famotidine (PEPCID) tablet 20 mg  20 mg Oral Daily Ridge Doyle DO        Or    [START ON 2025] famotidine (PEPCID) 20 mg in sodium chloride (PF) 0.9 % 10 mL injection  20 mg IntraVENous Daily Ridge Doyle DO        fentaNYL (SUBLIMAZE) injection 25 mcg  25 mcg IntraVENous Q2H PRN Ridge Doyle DO   25 mcg at 25 0923    ziprasidone (GEODON) injection 10 mg  10 mg IntraMUSCular Q6H PRN LacivSangita mancia APRN - CNP   10 mg at 25 0014    heparin (porcine) injection 10,000 Units  80 Units/kg IntraVENous PRN LacivitaSangita APRN - CNP        heparin (porcine) injection 5,000 Units  40 Units/kg IntraVENous PRN LacivitaSangita APRN - CNP        heparin 25,000 units in dextrose 5% 250 mL (premix) infusion  5-30 Units/kg/hr IntraVENous Continuous Lacivita, 
L hip hemiarthroplasty    Post op Day 1      S: Slept most of the day.  Minimal p.o. intake.  Appears comfortable.    O:     Vitals:    02/18/25 1739   BP: (!) 146/69   Pulse: 89   Resp:    Temp: 99.1 °F (37.3 °C)   SpO2: 94%        Dressing c/d/i   NV exam -not examined today   Calf - soft/nontender     H/H:   Recent Labs     02/17/25  0537   HGB 12.2*   HCT 39.1        PT/INR:   Recent Labs     02/16/25  0416   INR 1.2       A/P:   Left hip hemiarthroplasty postoperative day #1   Initiate PT/OT tomorrow, weightbearing as tolerated with hip precautions   On Eliquis long-term, IVC filter placed yesterday   Recent CVA   Will need rehab placement          
Notified anesthesia of BS 95, no change from 1211 today.  
Occupational Therapy    OCCUPATIONAL THERAPY INITIAL EVALUATION    [facility]   [address] [place], [place]         Date:2025                                                  Patient Name: Frankie Hernández    MRN: [id number]    : 1945    Room: [address] [place] [address]      Evaluating OT: Jeanette Gallegos OTR/L   [id number]      Referring Provider:Hieu Trujillo MD     Specific Provider Orders/Date:OT eval and treat 2025      Diagnosis:  History of CVA (cerebrovascular accident) [Z86.73]  Closed fracture of left hip, initial encounter ([facility]) [S72.002A]  Closed left hip fracture, initial encounter ([facility]) [S72.002A]  Hematoma of chest wall, unspecified laterality, initial encounter [S20.219A]    Surgery:  LEFT HIP HEMIARTHROPLASTY (Left: Hip)    Pertinent Medical History: Parkinson disease , L TKA , cervical spine surgery      Precautions:  Fall Risk, WBAT L LE, posterolateral hip precautions     Assessment of current deficits    [x] Functional mobility  [x]ADLs  [x] Strength               [x]Cognition    [x] Functional transfers   [x] IADLs         [x] Safety Awareness   [x]Endurance    [x] Fine Coordination              [x] Balance      [] Vision/perception   [x]Sensation     []Gross Motor Coordination  [] ROM  [] Delirium                   [] Motor Control     OT PLAN OF CARE   OT POC based on physician orders, patient diagnosis and results of clinical assessment    Frequency/Duration  2-4 days/wk for 2 - 4weeks PRN   Specific OT Treatment Interventions to include:   ADL retraining/adapted techniques and AE recommendations to increase functional independence within precautions                    Energy conservation techniques to improve tolerance for selfcare routine   Functional transfer/mobility training/DME recommendations for increased independence, safety and fall prevention         Patient/family education to increase safety and functional independence  
Occupational Therapy  Pt on hold per nursing.  Felicitas BARROW/SACHI [id number]  
Physical Therapy  Facility/Department: [address]  Physical Therapy Initial Assessment    Name: Frankie Hernández  : 1945  MRN: [id number]  Date of Service: 2025          Patient Diagnosis(es): The primary encounter diagnosis was Closed fracture of left hip, initial encounter (HCC). Diagnoses of Hematoma of chest wall, unspecified laterality, initial encounter, History of CVA (cerebrovascular accident), Closed left hip fracture, initial encounter (HCC), and Deep vein thrombosis (DVT) of non-extremity vein, unspecified chronicity were also pertinent to this visit.  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  Past Surgical History:  has a past surgical history that includes Cataract removal; Cervical spine surgery; eye surgery; Endoscopy, colon, diagnostic; total nephrectomy (Right, 2013); IVC filter insertion (2013); IVC filter removal (2014); invasive vascular (N/A, 2024); knee surgery (Left); invasive vascular (N/A, 2024); hip surgery (Left, 2025); and Vena Cava Filter Placement (N/A, 2025).         Requires PT Follow-Up: Yes    Evaluating Therapist: Alana Sanchez PT     Referring Provider:      Hieu Trujillo MD       PT order : PT eval and treat     Room #: 743  DIAGNOSIS: The primary encounter diagnosis was Closed fracture of left hip, initial encounter (HCC). Diagnoses of Hematoma of chest wall, unspecified laterality, initial encounter, History of CVA (cerebrovascular accident), Closed left hip fracture, initial encounter (HCC), and Deep vein thrombosis (DVT) 
Physical Therapy  Facility/Department: [address]  Physical Therapy Treatment Note    Name: Frankie Hernández  : 1945  MRN: [id number]  Date of Service: 2025          Patient Diagnosis(es): The primary encounter diagnosis was Closed fracture of left hip, initial encounter (HCC). Diagnoses of Hematoma of chest wall, unspecified laterality, initial encounter, History of CVA (cerebrovascular accident), Closed left hip fracture, initial encounter (HCC), and Deep vein thrombosis (DVT) of non-extremity vein, unspecified chronicity were also pertinent to this visit.  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  Past Surgical History:  has a past surgical history that includes Cataract removal; Cervical spine surgery; eye surgery; Endoscopy, colon, diagnostic; total nephrectomy (Right, 2013); IVC filter insertion (2013); IVC filter removal (2014); invasive vascular (N/A, 2024); knee surgery (Left); invasive vascular (N/A, 2024); hip surgery (Left, 2025); and Vena Cava Filter Placement (N/A, 2025).              Evaluating Therapist: Alana Sanchez PT     Referring Provider:      Hieu Trujillo MD       PT order : PT eval and treat     Room #: 743  DIAGNOSIS: The primary encounter diagnosis was Closed fracture of left hip, initial encounter (HCC). Diagnoses of Hematoma of chest wall, unspecified laterality, initial encounter, History of CVA (cerebrovascular accident), Closed left hip fracture, initial encounter (HCC), and Deep vein thrombosis (DVT) of non-extremity vein, unspecified 
RN call to Dr Doyle RE: patient agitated, oxygen dropped to 87%, non-rebreather mask placed. Respiratory called for breathing treatment. Respiratory at bedside. Dr. Doyel coming to bedside to see patient.   
RN message out to Dr. Doyle re: report patient's continued somnolence and inability to take or pills/food.   See new order for ABG.   Results sent to Dr. Doyle     See new order for chest xray and consult for Pulmonologist.   
RN message out to Dr. Macias re: clarify NPO order . See new order  RN notified Dr. Sotelo and Dr. Doyle of xray result. See new orders  
RN message out to Dr. Myers re: new consult.   
RN received call from Dr. Conrad who relayed that patient's Ellliquis was to be reordered 5mg BID.   RN message out to Dr. Doyle to relay and seek clarification.   Per Dr. Doyle order is to resumed and un held. See MAR   
Viraj sent to Dr. Doyle RE: NG tube placement unsuccessful.   See general surgery consult.   
Viraj sent to Dr. Doyle RE: requesting alternative pain medication d/t patient NPO status, and confirming pepcid BID order.   
Will see pt in preop area prior to OR     Plan is for IVC filter just prior to hip fx repair per Dr Cassandra Patterson MD    
on hold as noted above  IVC filter placed on 2/17 by vascular surgery     Follow labs  DVT prophylaxis with SCDs  Please see orders for further management and care.   for discharge planning  Discharge plan: Back to SNF when clinically stable    Electronically signed by Marcia Sanchez MD on 2/18/2025 at 8:36 AM    Addendum: I have personally participated in a face-to-face history and physical exam on the date of service with the patient. I have discussed the case with the resident. I also participated in medical decision making with the resident on the date of service and I agree with all of the pertinent clinical information unless indicated in my editing of the note. I have reviewed and edited the note above based on my findings during my history, exam, and decision making on the same day of service.     My additional thoughts:   He was seen and examined in his room  Wife present at bedside  He is very sleepy-hold Depakote and stop IV morphine  IVC filter placed 2/17 by vascular surgery for DVT  Hip fracture repair 2/17 by Ortho  We still need to restart Eliquis considering he had a recent stroke  He remains fatigued but this is likely related to anesthesia and pain medications  Discharge planning back to SNF when medically stable-not today    Electronically signed by Ridge Doyle DO on 2/18/2025 at 9:36 AM    I can be reached through [employer].

## 2025-02-20 NOTE — DISCHARGE SUMMARY
Internal Medicine Discharge Summary    NAME: Frankie Hernández :  1945  MRN:  [id number] PCP:Minor Cagle MD    ADMITTED: 2025   DISCHARGED: 2025 11:25 AM    ADMITTING PHYSICIAN: Ridge Doyle DO    PCP: Minor Cagle MD    CONSULTANT(S):   IP CONSULT TO ORTHOPEDIC SURGERY  IP CONSULT TO INTERNAL MEDICINE  IP CONSULT TO NEUROLOGY  IP CONSULT TO VASCULAR SURGERY  IP CONSULT TO SOCIAL WORK  IP CONSULT TO PULMONOLOGY  IP CONSULT TO VASCULAR ACCESS TEAM  IP CONSULT TO DIETITIAN     ADMITTING DIAGNOSIS:   History of CVA (cerebrovascular accident) [Z86.73]  Closed fracture of left hip, initial encounter ([facility]) [S72.002A]  Closed left hip fracture, initial encounter ([facility]) [S72.002A]  Hematoma of chest wall, unspecified laterality, initial encounter [S20.219A]     Please see H&P for further details    DISCHARGE DIAGNOSES:   Active Hospital Problems    Diagnosis     Closed fracture of left hip (HCC) [S72.002A]     Chronic ischemic left MCA stroke [Z86.73]     Cerebral atrophy [G31.9]     History of pulmonary embolism [Z86.711]     Renal carcinoma (HCC) [C64.9]     Parkinson disease (HCC) [G20.A1]     CKD (chronic kidney disease) stage 3, GFR 30-59 ml/min [N18.30]     Anxiety and depression [F41.9, F32.A]     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 who originally had concerns including Fall (From [facility], per EMS pt was standing up from

## 2025-02-20 NOTE — CARE COORDINATION
Pt to discharge to [facility] today with [facility]-notified and accepted. [facility] to transport at [phone] am. Paperwork on chart-mjo

## 2025-02-24 LAB — SURGICAL PATHOLOGY REPORT: NORMAL

## (undated) DEVICE — NEEDLE FLTR 18GA L1.5IN MEM THK5UM BLNT DISP

## (undated) DEVICE — DRAPE,REIN 53X77,STERILE: Brand: MEDLINE

## (undated) DEVICE — COUNTER NDL 30 COUNT DBL MAG

## (undated) DEVICE — TOWEL,OR,DSP,ST,BLUE,STD,6/PK,12PK/CS: Brand: MEDLINE

## (undated) DEVICE — SOLUTION IRRIG 1000ML STRL H2O USP PLAS POUR BTL

## (undated) DEVICE — 3M™ STERI-DRAPE™ U-DRAPE 1015: Brand: STERI-DRAPE™

## (undated) DEVICE — BLADE,STAINLESS-STEEL,10,STRL,DISPOSABLE: Brand: MEDLINE

## (undated) DEVICE — Device

## (undated) DEVICE — LABEL MED 4 IN SURG PANEL W/ PEN STRL

## (undated) DEVICE — 1000 S-DRAPE TOWEL DRAPE 10/BX: Brand: STERI-DRAPE™

## (undated) DEVICE — NEEDLE HYPO 25GA L1.5IN BLU POLYPR HUB S STL REG BVL STR

## (undated) DEVICE — Y-TYPE TUR/BLADDER IRRIGATION SET, REGULATING CLAMP

## (undated) DEVICE — 3M™ TEGADERM™ TRANSPARENT FILM DRESSING FRAME STYLE, 1626W, 4 IN X 4-3/4 IN (10 CM X 12 CM), 50/CT 4CT/CASE: Brand: 3M™ TEGADERM™

## (undated) DEVICE — GLOVE SURG SZ 65 L12IN FNGR THK79MIL GRN LTX FREE

## (undated) DEVICE — COVER,LIGHT HANDLE,FLX,2/PK: Brand: MEDLINE INDUSTRIES, INC.

## (undated) DEVICE — GLOVE ORANGE PI 7 1/2   MSG9075

## (undated) DEVICE — DRAPE C ARM W41XL74IN UNIV MOB W RUBBERBAND CLP

## (undated) DEVICE — GOWN,SIRUS,FABRNF,2XL,18/CS: Brand: MEDLINE

## (undated) DEVICE — GUIDEWIRE VASC L180CM DIA0.035IN L15CM STR TIP PTFE S STL

## (undated) DEVICE — COVER,TABLE,60X90,STERILE: Brand: MEDLINE

## (undated) DEVICE — PILLOW POS W15XH6XL22IN RASPBERRY FOAM ABD W/ STRP DISP FOR

## (undated) DEVICE — DRAPE,TOP,102X53,STERILE: Brand: MEDLINE

## (undated) DEVICE — PACK SURG CARDIAC CATH

## (undated) DEVICE — GOWN,SIRUS,POLYRNF,BRTHSLV,XL,30/CS: Brand: MEDLINE

## (undated) DEVICE — 3M™ COBAN™ NL STERILE NON-LATEX SELF-ADHERENT WRAP, 2084S, 4 IN X 5 YD (10 CM X 4,5 M), 18 ROLLS/CASE: Brand: 3M™ COBAN™

## (undated) DEVICE — APPLICATOR MEDICATED 26 CC SOLUTION HI LT ORNG CHLORAPREP

## (undated) DEVICE — KIT SURG W7XL11IN 2 PKT UNTREATED NA

## (undated) DEVICE — BLADE ES L6IN ELASTOMERIC COAT EXT DURABLE BEND UPTO 90DEG

## (undated) DEVICE — ELECTRODE PT RET AD L9FT HI MOIST COND ADH HYDRGEL CORDED

## (undated) DEVICE — DECANTER: Brand: UNBRANDED

## (undated) DEVICE — BLADE,STAINLESS-STEEL,11,STRL,DISPOSABLE: Brand: MEDLINE

## (undated) DEVICE — CANNULA NSL CANN NSL L25FT TBNG AD O2 SUP SFT UC

## (undated) DEVICE — INCISE SURGICAL DRAPE: Brand: OPSITE INCISE 28X30CM CTN 10

## (undated) DEVICE — SOLUTION IV 100ML 0.9% SOD CHL PLAS CONT USP VIAFLX 1 PER

## (undated) DEVICE — NEEDLE HYPO 18GA L1.5IN PNK POLYPR HUB S STL REG BVL STR

## (undated) DEVICE — DOUBLE BASIN SET: Brand: MEDLINE INDUSTRIES, INC.

## (undated) DEVICE — NEEDLE ANGIO 1 WALL STYL 18 GAX70 CM THN ADV

## (undated) DEVICE — SYRINGE MED 10ML POLYPR LUERSLIP TIP FLAT TOP W/O SFTY DISP

## (undated) DEVICE — MARKER,SKIN,WI/RULER AND LABELS: Brand: MEDLINE

## (undated) DEVICE — GOWN,SIRUS,FABRNF,XL,20/CS: Brand: MEDLINE

## (undated) DEVICE — RECIPROCATING BLADE HEAVY DUTY, OFFSET  (77.5 X 1.23 X 11.0MM)

## (undated) DEVICE — SYRINGE 20ML LL S/C 50

## (undated) DEVICE — HANDPIECE SET WITH COAXIAL HIGH FLOW TIP AND SUCTION TUBE: Brand: INTERPULSE

## (undated) DEVICE — BASIC SINGLE BASIN 1-LF: Brand: MEDLINE INDUSTRIES, INC.

## (undated) DEVICE — 4-PORT MANIFOLD: Brand: NEPTUNE 2

## (undated) DEVICE — MICROPUNCTURE INTRODUCER SET SILHOUETTE TRANSITIONLESS PUSH-PLUS DESIGN - STIFFENED CANNULA WITH STAINLESS STEEL WIRE GUIDE: Brand: MICROPUNCTURE

## (undated) DEVICE — STRIP,CLOSURE,WOUND,MEDI-STRIP,1/2X4: Brand: MEDLINE

## (undated) DEVICE — 3M™ IOBAN™ 2 ANTIMICROBIAL INCISE DRAPE 6650EZ: Brand: IOBAN™ 2

## (undated) DEVICE — NEEDLE HYPO 23GA L1.5IN TURQ POLYPR HUB S STL THN WALL IM

## (undated) DEVICE — DRAPE,LAPAROTOMY,PCH,STERILE: Brand: MEDLINE

## (undated) DEVICE — SOLUTION IV 1000ML 0.9% SOD CHL PH 5 INJ USP VIAFLX PLAS

## (undated) DEVICE — SYRINGE MED 10ML LUERLOCK TIP W/O SFTY DISP

## (undated) DEVICE — DRESSING HYDROFIBER AQUACEL AG ADVANTAGE 3.5X10 IN

## (undated) DEVICE — SOLUTION IRRIG 2000ML 0.9% SOD CHL USP UROMATIC PLAS CONT

## (undated) DEVICE — TUBING, SUCTION, 9/32" X 10', STRAIGHT: Brand: MEDLINE

## (undated) DEVICE — COAXIAL FEMORAL CANAL TIP

## (undated) DEVICE — SUTURE STRATAFIX SYMMETRIC PDS + SZ 1 L18IN ABSRB VLT OS-6 SXPP1A201

## (undated) DEVICE — CONVERTORS STOCKINETTE: Brand: CONVERTORS

## (undated) DEVICE — GAUZE,SPONGE,4"X4",16PLY,XRAY,STRL,LF: Brand: MEDLINE

## (undated) DEVICE — GLOVE SURG SZ 65 THK91MIL LTX FREE SYN POLYISOPRENE

## (undated) DEVICE — SOLUTION WND IRRIGATION 450 ML 0.5 PVP-I 0.9 NACL

## (undated) DEVICE — GLOVE SURG SZ 8 CRM LTX FREE POLYISOPRENE POLYMER BEAD ANTI

## (undated) DEVICE — BLADE,STAINLESS-STEEL,15,STRL,DISPOSABLE: Brand: MEDLINE

## (undated) DEVICE — SOLUTION IRRIG 1000ML 0.9% SOD CHL USP POUR PLAS BTL

## (undated) DEVICE — GAUZE,SPONGE,4"X4",8PLY,STRL,LF,10/TRAY: Brand: MEDLINE

## (undated) DEVICE — SPONGE LAP W18XL18IN WHT COT 4 PLY FLD STRUNG RADPQ DISP ST 2 PER PACK

## (undated) DEVICE — GUIDEWIRE ENDOSCP L150CM DIA0.035IN TIP L15CM BENT PTFE

## (undated) DEVICE — GUNTHER TULIP VENA CAVA FILTER RETRIEVAL SET: Brand: GUNTHER TULIP

## (undated) DEVICE — AEGIS 1" DISK 4MM HOLE, PEEL OPEN: Brand: MEDLINE

## (undated) DEVICE — 18GA (1.3MM OD: 1.0MM ID) X 7CM INTRODUCER18GA X 7CM NEEDLENEEDLE: Brand: INTRODUCER NEEDLEINTRODUCER NEEDLE